# Patient Record
Sex: FEMALE | Race: BLACK OR AFRICAN AMERICAN | Employment: FULL TIME | URBAN - METROPOLITAN AREA
[De-identification: names, ages, dates, MRNs, and addresses within clinical notes are randomized per-mention and may not be internally consistent; named-entity substitution may affect disease eponyms.]

---

## 2017-06-15 ENCOUNTER — HOSPITAL ENCOUNTER (OUTPATIENT)
Dept: NUCLEAR MEDICINE | Age: 37
Discharge: HOME | End: 2017-06-15
Payer: COMMERCIAL

## 2017-06-15 ENCOUNTER — HOSPITAL ENCOUNTER (OUTPATIENT)
Dept: ULTRASOUND IMAGING | Age: 37
Discharge: HOME | End: 2017-06-15
Payer: COMMERCIAL

## 2017-06-15 DIAGNOSIS — R07.9 CHEST PAIN, UNSPECIFIED TYPE: ICD-10-CM

## 2017-06-15 DIAGNOSIS — R07.9 CHEST PAIN AT REST: ICD-10-CM

## 2017-06-15 DIAGNOSIS — R12 HEARTBURN: ICD-10-CM

## 2017-06-15 DIAGNOSIS — E03.9 HYPOTHYROIDISM, ADULT: ICD-10-CM

## 2017-06-15 DIAGNOSIS — R00.2 PALPITATIONS: ICD-10-CM

## 2017-06-15 PROCEDURE — 78227 HEPATOBIL SYST IMAGE W/DRUG: CPT

## 2017-06-15 PROCEDURE — 3430000000 HC RX DIAGNOSTIC RADIOPHARMACEUTICAL: Performed by: INTERNAL MEDICINE

## 2017-06-15 PROCEDURE — A9537 TC99M MEBROFENIN: HCPCS | Performed by: INTERNAL MEDICINE

## 2017-06-15 PROCEDURE — 76705 ECHO EXAM OF ABDOMEN: CPT

## 2017-06-15 RX ADMIN — Medication 7.5 MILLICURIE: at 08:15

## 2017-08-09 ENCOUNTER — HOSPITAL ENCOUNTER (OUTPATIENT)
Dept: GENERAL RADIOLOGY | Age: 37
Discharge: HOME | End: 2017-08-09
Payer: COMMERCIAL

## 2017-08-09 DIAGNOSIS — M25.551 RIGHT HIP PAIN: ICD-10-CM

## 2017-08-09 DIAGNOSIS — M54.5 LOW BACK PAIN, UNSPECIFIED BACK PAIN LATERALITY, UNSPECIFIED CHRONICITY, WITH SCIATICA PRESENCE UNSPECIFIED: ICD-10-CM

## 2017-08-09 PROCEDURE — 72110 X-RAY EXAM L-2 SPINE 4/>VWS: CPT

## 2017-08-09 PROCEDURE — 73502 X-RAY EXAM HIP UNI 2-3 VIEWS: CPT

## 2017-09-11 ENCOUNTER — OFFICE VISIT (OUTPATIENT)
Dept: OBGYN | Age: 37
End: 2017-09-11

## 2017-09-11 VITALS
HEIGHT: 64 IN | SYSTOLIC BLOOD PRESSURE: 115 MMHG | DIASTOLIC BLOOD PRESSURE: 80 MMHG | BODY MASS INDEX: 36.19 KG/M2 | WEIGHT: 212 LBS

## 2017-09-11 DIAGNOSIS — Z01.419 WELL WOMAN EXAM WITH ROUTINE GYNECOLOGICAL EXAM: Primary | ICD-10-CM

## 2017-09-11 DIAGNOSIS — Z11.51 SCREENING FOR HPV (HUMAN PAPILLOMAVIRUS): ICD-10-CM

## 2017-09-11 PROCEDURE — 99395 PREV VISIT EST AGE 18-39: CPT | Performed by: OBSTETRICS & GYNECOLOGY

## 2017-09-11 RX ORDER — NAPROXEN 250 MG/1
TABLET ORAL
Refills: 1 | COMMUNITY
Start: 2017-08-13 | End: 2021-08-18

## 2017-09-11 RX ORDER — CYCLOBENZAPRINE HCL 5 MG
TABLET ORAL
Refills: 1 | COMMUNITY
Start: 2017-08-13 | End: 2017-09-13

## 2017-09-11 RX ORDER — ONDANSETRON 4 MG/1
TABLET, ORALLY DISINTEGRATING ORAL
Refills: 2 | COMMUNITY
Start: 2017-08-13 | End: 2021-10-28 | Stop reason: SDUPTHER

## 2017-09-11 RX ORDER — CHOLECALCIFEROL (VITAMIN D3) 50 MCG
CAPSULE ORAL
Refills: 2 | COMMUNITY
Start: 2017-08-13

## 2017-09-11 RX ORDER — LEVOTHYROXINE SODIUM 0.2 MG/1
200 TABLET ORAL
Refills: 2 | COMMUNITY
Start: 2017-08-13 | End: 2021-08-18 | Stop reason: SDUPTHER

## 2017-09-12 LAB — PAP SMEAR: NORMAL

## 2017-09-13 ENCOUNTER — OFFICE VISIT (OUTPATIENT)
Dept: PRIMARY CARE CLINIC | Age: 37
End: 2017-09-13

## 2017-09-13 VITALS
DIASTOLIC BLOOD PRESSURE: 58 MMHG | SYSTOLIC BLOOD PRESSURE: 102 MMHG | HEART RATE: 64 BPM | TEMPERATURE: 98 F | RESPIRATION RATE: 14 BRPM | WEIGHT: 206 LBS | BODY MASS INDEX: 34.32 KG/M2 | HEIGHT: 65 IN

## 2017-09-13 DIAGNOSIS — E03.9 HYPOTHYROIDISM, UNSPECIFIED TYPE: ICD-10-CM

## 2017-09-13 DIAGNOSIS — K21.9 GASTROESOPHAGEAL REFLUX DISEASE WITHOUT ESOPHAGITIS: Primary | ICD-10-CM

## 2017-09-13 DIAGNOSIS — E66.01 MORBID OBESITY DUE TO EXCESS CALORIES (HCC): ICD-10-CM

## 2017-09-13 PROCEDURE — 99204 OFFICE O/P NEW MOD 45 MIN: CPT | Performed by: FAMILY MEDICINE

## 2017-09-13 RX ORDER — PHENTERMINE HYDROCHLORIDE 37.5 MG/1
37.5 TABLET ORAL
Qty: 30 TABLET | Refills: 0 | Status: SHIPPED | OUTPATIENT
Start: 2017-09-13 | End: 2017-10-10 | Stop reason: SDUPTHER

## 2017-09-13 ASSESSMENT — PATIENT HEALTH QUESTIONNAIRE - PHQ9
2. FEELING DOWN, DEPRESSED OR HOPELESS: 0
SUM OF ALL RESPONSES TO PHQ9 QUESTIONS 1 & 2: 0
1. LITTLE INTEREST OR PLEASURE IN DOING THINGS: 0
SUM OF ALL RESPONSES TO PHQ QUESTIONS 1-9: 0

## 2017-09-13 ASSESSMENT — ENCOUNTER SYMPTOMS
ABDOMINAL PAIN: 0
WATER BRASH: 0
SORE THROAT: 0
STRIDOR: 0
NAUSEA: 0
HOARSE VOICE: 0
WHEEZING: 0
CHOKING: 0
GLOBUS SENSATION: 0

## 2017-09-25 DIAGNOSIS — Z01.419 WELL WOMAN EXAM WITH ROUTINE GYNECOLOGICAL EXAM: ICD-10-CM

## 2017-09-25 DIAGNOSIS — Z11.51 SCREENING FOR HPV (HUMAN PAPILLOMAVIRUS): ICD-10-CM

## 2017-10-10 ENCOUNTER — OFFICE VISIT (OUTPATIENT)
Dept: PRIMARY CARE CLINIC | Age: 37
End: 2017-10-10

## 2017-10-10 VITALS
OXYGEN SATURATION: 97 % | TEMPERATURE: 98.5 F | HEIGHT: 64 IN | RESPIRATION RATE: 15 BRPM | HEART RATE: 69 BPM | DIASTOLIC BLOOD PRESSURE: 68 MMHG | BODY MASS INDEX: 36.19 KG/M2 | WEIGHT: 212 LBS | SYSTOLIC BLOOD PRESSURE: 114 MMHG

## 2017-10-10 DIAGNOSIS — K21.9 GASTROESOPHAGEAL REFLUX DISEASE WITHOUT ESOPHAGITIS: ICD-10-CM

## 2017-10-10 DIAGNOSIS — E03.9 HYPOTHYROIDISM, UNSPECIFIED TYPE: Primary | ICD-10-CM

## 2017-10-10 DIAGNOSIS — E66.01 MORBID OBESITY DUE TO EXCESS CALORIES (HCC): ICD-10-CM

## 2017-10-10 DIAGNOSIS — E55.9 VITAMIN D DEFICIENCY: ICD-10-CM

## 2017-10-10 DIAGNOSIS — E03.9 HYPOTHYROIDISM, UNSPECIFIED TYPE: ICD-10-CM

## 2017-10-10 LAB
ALBUMIN SERPL-MCNC: 3.9 G/DL (ref 3.9–4.9)
ALP BLD-CCNC: 58 U/L (ref 40–130)
ALT SERPL-CCNC: 15 U/L (ref 0–33)
ANION GAP SERPL CALCULATED.3IONS-SCNC: 13 MEQ/L (ref 7–13)
AST SERPL-CCNC: 16 U/L (ref 0–35)
BASOPHILS ABSOLUTE: 0.1 K/UL (ref 0–0.2)
BASOPHILS RELATIVE PERCENT: 0.8 %
BILIRUB SERPL-MCNC: 0.1 MG/DL (ref 0–1.2)
BUN BLDV-MCNC: 8 MG/DL (ref 6–20)
CALCIUM SERPL-MCNC: 8.9 MG/DL (ref 8.6–10.2)
CHLORIDE BLD-SCNC: 104 MEQ/L (ref 98–107)
CO2: 24 MEQ/L (ref 22–29)
CREAT SERPL-MCNC: 0.77 MG/DL (ref 0.5–0.9)
EOSINOPHILS ABSOLUTE: 0.2 K/UL (ref 0–0.7)
EOSINOPHILS RELATIVE PERCENT: 3.1 %
GFR AFRICAN AMERICAN: >60
GFR NON-AFRICAN AMERICAN: >60
GLOBULIN: 2.8 G/DL (ref 2.3–3.5)
GLUCOSE BLD-MCNC: 80 MG/DL (ref 74–109)
HCT VFR BLD CALC: 39.7 % (ref 37–47)
HEMOGLOBIN: 12.6 G/DL (ref 12–16)
LYMPHOCYTES ABSOLUTE: 2.4 K/UL (ref 1–4.8)
LYMPHOCYTES RELATIVE PERCENT: 37.4 %
MCH RBC QN AUTO: 26.4 PG (ref 27–31.3)
MCHC RBC AUTO-ENTMCNC: 31.7 % (ref 33–37)
MCV RBC AUTO: 83.5 FL (ref 82–100)
MONOCYTES ABSOLUTE: 0.6 K/UL (ref 0.2–0.8)
MONOCYTES RELATIVE PERCENT: 8.6 %
NEUTROPHILS ABSOLUTE: 3.2 K/UL (ref 1.4–6.5)
NEUTROPHILS RELATIVE PERCENT: 50.1 %
PDW BLD-RTO: 15.1 % (ref 11.5–14.5)
PLATELET # BLD: 321 K/UL (ref 130–400)
POTASSIUM SERPL-SCNC: 4.2 MEQ/L (ref 3.5–5.1)
RBC # BLD: 4.75 M/UL (ref 4.2–5.4)
SODIUM BLD-SCNC: 141 MEQ/L (ref 132–144)
TOTAL PROTEIN: 6.7 G/DL (ref 6.4–8.1)
TSH SERPL DL<=0.05 MIU/L-ACNC: 40.92 UIU/ML (ref 0.27–4.2)
VITAMIN D 25-HYDROXY: 17.9 NG/ML (ref 30–100)
WBC # BLD: 6.5 K/UL (ref 4.8–10.8)

## 2017-10-10 PROCEDURE — 99214 OFFICE O/P EST MOD 30 MIN: CPT | Performed by: FAMILY MEDICINE

## 2017-10-10 RX ORDER — PHENTERMINE HYDROCHLORIDE 37.5 MG/1
37.5 TABLET ORAL
Qty: 30 TABLET | Refills: 0 | Status: SHIPPED | OUTPATIENT
Start: 2017-10-10 | End: 2017-11-09

## 2017-10-10 ASSESSMENT — ENCOUNTER SYMPTOMS
ABDOMINAL PAIN: 0
VOMITING: 0
CONSTIPATION: 0
NAUSEA: 0
DIARRHEA: 0
WHEEZING: 0
SHORTNESS OF BREATH: 0

## 2017-10-10 NOTE — PROGRESS NOTES
Subjective:      Patient ID: Matthew Connell is a 40 y.o. female who presents today for:  Chief Complaint   Patient presents with    Weight Management     patient following up for weight management. patient is currently taking adipex and is here for her 2nd prescription. HPI    WEIGHT MANAGEMENT  Patient is here for a follow up and refill on Adipex. She is up 6 lbs since her last visit. She denies any symptoms. Fu gerd,thyr     Chronic,stable    Past Medical History:   Diagnosis Date    Thyroid disease      Past Surgical History:   Procedure Laterality Date    DENTAL SURGERY      THYROIDECTOMY      TUBAL LIGATION       No family history on file. Social History     Social History    Marital status:      Spouse name: N/A    Number of children: N/A    Years of education: N/A     Occupational History    Not on file. Social History Main Topics    Smoking status: Never Smoker    Smokeless tobacco: Never Used    Alcohol use 0.0 oz/week    Drug use: No    Sexual activity: Yes     Other Topics Concern    Not on file     Social History Narrative    No narrative on file     Allergies:  Review of patient's allergies indicates no known allergies. Review of Systems   Constitutional: Negative for activity change, appetite change, chills, diaphoresis, fatigue, fever and unexpected weight change. Respiratory: Negative for shortness of breath and wheezing. Cardiovascular: Negative for chest pain, palpitations and leg swelling. Gastrointestinal: Negative for abdominal pain, constipation, diarrhea, nausea and vomiting. Neurological: Negative for dizziness, light-headedness, numbness and headaches. Objective:   /68   Pulse 69   Temp 98.5 °F (36.9 °C) (Tympanic)   Resp 15   Ht 5' 4\" (1.626 m)   Wt 212 lb (96.2 kg)   LMP 08/31/2017   SpO2 97%   BMI 36.39 kg/m²     Physical Exam      PHYSICAL EXAMINATION:   VITAL SIGNS: are as recorded.   GENERAL:  The patient appears well nourished and well-developed, and with normal affect. No acute respiratory distress. Alert and oriented times 3. No skin rashes. HEENT:  TMs normal bilaterally. Canals and ears normal. Pharynx is clear. Extraocular eye motions intact and pain free. Pupils reactive and equally round. Sclerae and conjunctivae clear, normocephalic and atraumatic. RESPIRATORY:  Clear and equal breath sounds with no acute respiratory distress. HEART: Regular rhythm without murmur, rub or gallop. ABDOMEN:  soft, nontender. No masses, guarding or rebound. Normoactive bowel sounds. NECK: No masses or adenopathy palpable. No bruits heard. No asymmetry visible. COMPLETE EXTREMITIES: No edema, all four extremities with posterior tibial and radial pulses strong and palpable. EXTREMITIES:  no edema in any extremity. No cyanosis or clubbing. LOW BACK: No tenderness to palpation of inferior lumbar spine or either sacroiliac joint area. Assessment:     1. Hypothyroidism, unspecified type  TSH without Reflex    Comprehensive Metabolic Panel   2. Morbid obesity due to excess calories (HCC)  phentermine (ADIPEX-P) 37.5 MG tablet   3. Gastroesophageal reflux disease without esophagitis  CBC Auto Differential   4.  Vitamin D deficiency  Vitamin D 25 Hydroxy       Plan:      Orders Placed This Encounter   Procedures    TSH without Reflex     Standing Status:   Future     Number of Occurrences:   1     Standing Expiration Date:   11/10/2017    Comprehensive Metabolic Panel     Standing Status:   Future     Number of Occurrences:   1     Standing Expiration Date:   10/10/2018    CBC Auto Differential     Standing Status:   Future     Number of Occurrences:   1     Standing Expiration Date:   11/10/2017    Vitamin D 25 Hydroxy     Standing Status:   Future     Number of Occurrences:   1     Standing Expiration Date:   10/10/2018     Orders Placed This Encounter   Medications    phentermine (ADIPEX-P) 37.5 MG tablet     Sig: Take 1 tablet by mouth every morning (before breakfast) Body mass index is 36.39 kg/m². Dispense:  30 tablet     Refill:  0     Lose wt    Vit d & thyr next    Controlled Substances Monitoring:      Return in about 4 weeks (around 11/7/2017). IMaddy, am scribing for and in the presence of Oliver Orourke MD. Electronically signed by : Devin Maynard MD, personally performed the services described in this documentation, as scribed by Maddy HALLMAN   in my presence, and it is both accurate and complete.  Electronically signed by: Oliver Orourke MD    10/11/17 6:19 AM    Oliver Orourke MD

## 2017-10-11 RX ORDER — LEVOTHYROXINE SODIUM 0.05 MG/1
50 TABLET ORAL DAILY
Qty: 30 TABLET | Refills: 3 | Status: SHIPPED | OUTPATIENT
Start: 2017-10-11 | End: 2021-08-18 | Stop reason: SDUPTHER

## 2017-10-11 RX ORDER — CHOLECALCIFEROL (VITAMIN D3) 50 MCG
2000 TABLET ORAL DAILY
Qty: 30 TABLET | Refills: 3 | Status: CANCELLED | OUTPATIENT
Start: 2017-10-11

## 2017-10-11 RX ORDER — LEVOTHYROXINE SODIUM 0.07 MG/1
75 TABLET ORAL DAILY
Qty: 30 TABLET | Refills: 2 | Status: CANCELLED | OUTPATIENT
Start: 2017-10-11

## 2017-12-01 ENCOUNTER — OFFICE VISIT (OUTPATIENT)
Dept: OBGYN | Age: 37
End: 2017-12-01

## 2017-12-01 VITALS
WEIGHT: 207 LBS | DIASTOLIC BLOOD PRESSURE: 62 MMHG | BODY MASS INDEX: 35.34 KG/M2 | HEIGHT: 64 IN | SYSTOLIC BLOOD PRESSURE: 96 MMHG

## 2017-12-01 DIAGNOSIS — N76.0 VAGINOSIS: Primary | ICD-10-CM

## 2017-12-01 PROCEDURE — G8417 CALC BMI ABV UP PARAM F/U: HCPCS | Performed by: NURSE PRACTITIONER

## 2017-12-01 PROCEDURE — 99213 OFFICE O/P EST LOW 20 MIN: CPT | Performed by: NURSE PRACTITIONER

## 2017-12-01 PROCEDURE — G8427 DOCREV CUR MEDS BY ELIG CLIN: HCPCS | Performed by: NURSE PRACTITIONER

## 2017-12-01 PROCEDURE — 4004F PT TOBACCO SCREEN RCVD TLK: CPT | Performed by: NURSE PRACTITIONER

## 2017-12-01 PROCEDURE — G8484 FLU IMMUNIZE NO ADMIN: HCPCS | Performed by: NURSE PRACTITIONER

## 2017-12-01 RX ORDER — METRONIDAZOLE 500 MG/1
500 TABLET ORAL 2 TIMES DAILY
Qty: 14 TABLET | Refills: 0 | Status: SHIPPED | OUTPATIENT
Start: 2017-12-01 | End: 2017-12-08

## 2017-12-01 NOTE — PROGRESS NOTES
SUBJECTIVE: 40 y.o. female Pt here today for c/o increased discharge that is white and foul odor for the past week. Pt denies vaginal itching/burning, recent antibiotic use, hormone/contraceptive use, increase in sexual intercourse. Pt has not tried any OTC treatments other than douching, she has a new partner and uses tampons. OBJECTIVE:  BP 96/62   Ht 5' 3.5\" (1.613 m)   Wt 207 lb (93.9 kg)   LMP 11/02/2017 (Exact Date)   Breastfeeding? No   BMI 36.09 kg/m²     Physical Exam    Pelvic Exam:   EFG: normal external genitalia  URETHRA: normal appearing without diverticula or lesions  VULVA: normal appearing vulva with no masses, tenderness or lesions  VAGINA: normal rugae, scant white discharge   CERVIX: parous, no lesions  UTERUS: difficult to assess due to body habitus, nontender. ADNEXA: difficult to assess due to body habitus, nontender. PERINEUM: normal appearing without lesions or masses  ANUS: normal appearing without lesions or masses, no fissures or hemorrhoids     ASSESSMENT:   Vaginosis    PLAN:    STI culture pending  Metronidazole 500mg PO BID x 7 days, #14, no refills. Appropriate use of medication discussed, pt verbalized understanding. Information given and discussed in regards to BV. Pt verbalized understanding. Pt advised to RTC if s/s worsen or fail to improve, pt verbalized understanding.

## 2017-12-11 ENCOUNTER — TELEPHONE (OUTPATIENT)
Dept: OBGYN | Age: 37
End: 2017-12-11

## 2017-12-11 DIAGNOSIS — N76.0 VAGINOSIS: ICD-10-CM

## 2017-12-11 NOTE — TELEPHONE ENCOUNTER
----- Message from Nicolasa Goodman CNP sent at 12/11/2017  2:28 PM EST -----  Pt treated for BV with appt. Pt with yeast if she is having s/s and desires treatment, she may have Fluconazole 150 mg PO x 1, with 1 refill to take in 72 hours if no improvement and Terazol 3, insert 1 suppository intravaginally at bedtime for 3 consecutive days, #3 suppositories, no refills. Pt with ureaplasma species, if having s/s pt may have Azithromycin 1gm, Take (500mg) 2 tabs PO in a single dose, Disp #2, no refills.

## 2017-12-12 RX ORDER — AZITHROMYCIN 500 MG/1
1000 TABLET, FILM COATED ORAL ONCE
Qty: 2 TABLET | Refills: 0 | Status: SHIPPED | OUTPATIENT
Start: 2017-12-12 | End: 2017-12-12

## 2017-12-12 RX ORDER — FLUCONAZOLE 150 MG/1
150 TABLET ORAL ONCE
Qty: 2 TABLET | Refills: 0 | Status: SHIPPED | OUTPATIENT
Start: 2017-12-12 | End: 2017-12-12

## 2017-12-12 RX ORDER — TERCONAZOLE 80 MG/1
80 SUPPOSITORY VAGINAL NIGHTLY
Qty: 3 SUPPOSITORY | Refills: 0 | Status: SHIPPED | OUTPATIENT
Start: 2017-12-12 | End: 2017-12-15

## 2019-07-13 ENCOUNTER — HOSPITAL ENCOUNTER (OUTPATIENT)
Dept: ULTRASOUND IMAGING | Age: 39
Discharge: HOME | End: 2019-07-15
Payer: COMMERCIAL

## 2019-07-13 DIAGNOSIS — N83.201 RIGHT OVARIAN CYST: ICD-10-CM

## 2019-07-13 PROCEDURE — 76856 US EXAM PELVIC COMPLETE: CPT

## 2019-07-13 PROCEDURE — 76830 TRANSVAGINAL US NON-OB: CPT

## 2020-09-22 ENCOUNTER — HOSPITAL ENCOUNTER (OUTPATIENT)
Dept: ULTRASOUND IMAGING | Age: 40
Discharge: HOME | End: 2020-09-24
Payer: COMMERCIAL

## 2020-09-22 PROCEDURE — 76830 TRANSVAGINAL US NON-OB: CPT

## 2020-09-22 PROCEDURE — 76856 US EXAM PELVIC COMPLETE: CPT

## 2021-03-09 ENCOUNTER — HOSPITAL ENCOUNTER (OUTPATIENT)
Dept: GENERAL RADIOLOGY | Age: 41
Discharge: HOME | End: 2021-03-11
Payer: COMMERCIAL

## 2021-03-09 DIAGNOSIS — K14.8 ATROPHY OF TONGUE: ICD-10-CM

## 2021-03-09 PROCEDURE — 72050 X-RAY EXAM NECK SPINE 4/5VWS: CPT

## 2021-03-20 ENCOUNTER — HOSPITAL ENCOUNTER (OUTPATIENT)
Dept: ULTRASOUND IMAGING | Age: 41
Discharge: HOME | End: 2021-03-22
Payer: COMMERCIAL

## 2021-03-20 DIAGNOSIS — R59.0 VIRCHOW'S NODE: ICD-10-CM

## 2021-03-20 PROCEDURE — 76999 ECHO EXAMINATION PROCEDURE: CPT

## 2021-07-20 ENCOUNTER — HOSPITAL ENCOUNTER (OUTPATIENT)
Dept: CT IMAGING | Age: 41
Discharge: HOME | End: 2021-07-22
Payer: COMMERCIAL

## 2021-07-20 DIAGNOSIS — R42 DIZZINESS AND GIDDINESS: ICD-10-CM

## 2021-07-20 PROCEDURE — 70450 CT HEAD/BRAIN W/O DYE: CPT

## 2021-08-18 ENCOUNTER — OFFICE VISIT (OUTPATIENT)
Dept: ENDOCRINOLOGY | Age: 41
End: 2021-08-18
Payer: COMMERCIAL

## 2021-08-18 VITALS
HEART RATE: 67 BPM | HEIGHT: 64 IN | WEIGHT: 197 LBS | DIASTOLIC BLOOD PRESSURE: 79 MMHG | OXYGEN SATURATION: 98 % | BODY MASS INDEX: 33.63 KG/M2 | SYSTOLIC BLOOD PRESSURE: 117 MMHG

## 2021-08-18 DIAGNOSIS — E28.2 PCOS (POLYCYSTIC OVARIAN SYNDROME): Primary | ICD-10-CM

## 2021-08-18 DIAGNOSIS — E88.81 INSULIN RESISTANCE: ICD-10-CM

## 2021-08-18 DIAGNOSIS — E89.0 POSTOPERATIVE HYPOTHYROIDISM: ICD-10-CM

## 2021-08-18 DIAGNOSIS — L68.0 HIRSUTISM: ICD-10-CM

## 2021-08-18 DIAGNOSIS — R07.9 CHEST PAIN, UNSPECIFIED TYPE: ICD-10-CM

## 2021-08-18 PROCEDURE — G8427 DOCREV CUR MEDS BY ELIG CLIN: HCPCS | Performed by: INTERNAL MEDICINE

## 2021-08-18 PROCEDURE — G8417 CALC BMI ABV UP PARAM F/U: HCPCS | Performed by: INTERNAL MEDICINE

## 2021-08-18 PROCEDURE — 99243 OFF/OP CNSLTJ NEW/EST LOW 30: CPT | Performed by: INTERNAL MEDICINE

## 2021-08-18 RX ORDER — LEVOTHYROXINE SODIUM 0.2 MG/1
TABLET ORAL
Qty: 30 TABLET | Refills: 3 | Status: SHIPPED | OUTPATIENT
Start: 2021-08-18 | End: 2022-10-07

## 2021-08-18 RX ORDER — LEVOTHYROXINE SODIUM 0.03 MG/1
TABLET ORAL
Qty: 30 TABLET | Refills: 6 | Status: SHIPPED | OUTPATIENT
Start: 2021-08-18 | End: 2022-04-25

## 2021-08-18 RX ORDER — SPIRONOLACTONE 50 MG/1
50 TABLET, FILM COATED ORAL 2 TIMES DAILY
Qty: 60 TABLET | Refills: 3 | Status: SHIPPED | OUTPATIENT
Start: 2021-08-18

## 2021-08-18 RX ORDER — BUSPIRONE HYDROCHLORIDE 5 MG/1
5 TABLET ORAL 3 TIMES DAILY
COMMUNITY
End: 2022-06-14 | Stop reason: ALTCHOICE

## 2021-08-18 ASSESSMENT — ENCOUNTER SYMPTOMS
EYES NEGATIVE: 1
SWOLLEN GLANDS: 0
RESPIRATORY NEGATIVE: 1

## 2021-08-18 NOTE — PROGRESS NOTES
8/18/2021    Assessment:       Diagnosis Orders   1. PCOS (polycystic ovarian syndrome)     2. Postoperative hypothyroidism  TSH without Reflex    T4, Free   3. Insulin resistance  Insulin, Total    Hemoglobin Y8C    Basic Metabolic Panel   4. Chest pain, unspecified type  [id number] Val Russell DO, Cardiology, [place]   5.  Hirsutism  Testosterone Free And Total, Non Male         PLAN:     Orders Placed This Encounter   Procedures    TSH without Reflex     Standing Status:   Future     Standing Expiration Date:   8/18/2022    T4, Free     Standing Status:   Future     Standing Expiration Date:   8/18/2022    Insulin, Total     Standing Status:   Future     Standing Expiration Date:   8/18/2022    Hemoglobin A1C     Standing Status:   Future     Standing Expiration Date:   8/18/2022    Basic Metabolic Panel     Standing Status:   Future     Standing Expiration Date:   8/18/2022    Testosterone Free And Total, Non Male     Standing Status:   Future     Standing Expiration Date:   8/18/2022   Val Mcdaniels DO, Cardiology, [place]     Referral Priority:   Routine     Referral Type:   Eval and Treat     Referral Reason:   Specialty Services Required     Referred to Provider:   Calvin Owens DO     Requested Specialty:   Cardiology     Number of Visits Requested:   1     Continue Synthroid 225 mcg daily advised compliance advised to take Synthroid start patient on Metformin and Aldactone PCOS hirsutism refer patient to cardiology for chest pain patient educated about PCOS hypothyroidism insulin resistance more than 50% of 40 minutes spent patient education counseling thank you for the referral  Labs to be ordered for BMP A1c insulin and thyroid antibodies  Orders Placed This Encounter   Medications    levothyroxine (SYNTHROID) 200 MCG tablet     Sig: Once a day in addition to 25 mcg total dose 225 mcg     Dispense:  30 tablet     Refill:  3    levothyroxine (SYNTHROID) 25 MCG tablet     Sig: Once day total dose 225 mcg daily     Dispense:  30 tablet     Refill:  06    metFORMIN (GLUCOPHAGE) 500 MG tablet     Sig: Take 1 tablet by mouth 2 times daily (with meals)     Dispense:  180 tablet     Refill:  1    spironolactone (ALDACTONE) 50 MG tablet     Sig: Take 1 tablet by mouth 2 times daily     Dispense:  60 tablet     Refill:  3       Subjective:     Chief Complaint   Patient presents with    New Patient    Thyroid Problem     Vitals:    08/18/21 0857   BP: 117/79   Pulse: 67   SpO2: 98%   Weight: 197 lb (89.4 kg)   Height: 5' 3.5\" (1.613 m)     Wt Readings from Last 3 Encounters:   08/18/21 197 lb (89.4 kg)   12/01/17 207 lb (93.9 kg)   10/10/17 212 lb (96.2 kg)     BP Readings from Last 3 Encounters:   08/18/21 117/79   12/01/17 96/62   10/10/17 114/68     Patient referred here for hypothyroidism postop thyroid surgery was done more than 10 years ago at the [facility] for the patient has been on thyroid replacement takes 225 mcg daily has not been very compliant to her replacement last TSH was elevated from [facility] at 16  Patient also has been evaluated for PCOS insulin resistance obesity A1c was 5.8 a year ago borderline denies a history of diabetes prediabetes does also have increased facial hair ingrown hair check  Patient also seen in the emergency room recently for chest pain patient never had chest pain prior to that before denies any family history of heart disease    Chest Pain   This is a new problem. The current episode started 1 to 4 weeks ago. The onset quality is sudden. The problem has been waxing and waning. The pain is present in the substernal region. The pain radiates to the left shoulder. The pain is aggravated by nothing. She has tried nothing for the symptoms. Other  This is a new (Hypothyroidism) problem. The current episode started more than 1 year ago. The problem has been waxing and waning. Associated symptoms include chest pain and fatigue.  Pertinent negatives Attends Club or Organization Meetings:     Marital Status:    Intimate Partner Violence:     Fear of Current or Ex-Partner:     Emotionally Abused:     Physically Abused:     Sexually Abused:      No family history on file. No Known Allergies    Current Outpatient Medications:     busPIRone (BUSPAR) 5 MG tablet, Take 5 mg by mouth 3 times daily, Disp: , Rfl:     Multiple Vitamin (MULTIVITAMIN ADULT PO), Take by mouth, Disp: , Rfl:     levothyroxine (SYNTHROID) 50 MCG tablet, Take 1 tablet by mouth Daily (Patient taking differently: Take 25 mcg by mouth Daily ), Disp: 30 tablet, Rfl: 3    levothyroxine (SYNTHROID) 200 MCG tablet, 200 mcg , Disp: , Rfl: 2    D3 SUPER STRENGTH 2000 units CAPS, TAKE 1 CAPSULE DAILY, Disp: , Rfl: 2    ondansetron (ZOFRAN-ODT) 4 MG disintegrating tablet, Dissolve one tablet in the mouth every 8 hours as needed for NAUSEA AND VOMITING, Disp: , Rfl: 2    Pantoprazole Sodium (PROTONIX PO), Take by mouth, Disp: , Rfl:   Lab Results   Component Value Date     10/10/2017    K 4.2 10/10/2017     10/10/2017    CO2 24 10/10/2017    BUN 8 10/10/2017    CREATININE 0.77 10/10/2017    GLUCOSE 80 10/10/2017    CALCIUM 8.9 10/10/2017    PROT 6.7 10/10/2017    LABALBU 3.9 10/10/2017    BILITOT 0.1 10/10/2017    ALKPHOS 58 10/10/2017    AST 16 10/10/2017    ALT 15 10/10/2017    LABGLOM >60.0 10/10/2017    GFRAA >60.0 10/10/2017    GLOB 2.8 10/10/2017     Lab Results   Component Value Date    WBC 6.5 10/10/2017    HGB 12.6 10/10/2017    HCT 39.7 10/10/2017    MCV 83.5 10/10/2017     10/10/2017     No results found for: LABA1C  No results found for: CHOLFAST, TRIGLYCFAST, HDL, LDLCALC, CHOL, TRIG  No results found for: TESTM  Lab Results   Component Value Date    TSH 40.920 (H) 10/10/2017     No results found for: TPOABS    Review of Systems   Constitutional: Positive for fatigue and unexpected weight change. Eyes: Negative. Respiratory: Negative.     Cardiovascular: Positive for chest pain. Chest pressure   Endocrine: Negative. Genitourinary: Negative. Musculoskeletal: Negative for neck pain. Skin:        Facial hair skin breakouts   All other systems reviewed and are negative. Objective:   Physical Exam  Vitals reviewed. Constitutional:       General: She is not in acute distress. Appearance: Normal appearance. She is obese. HENT:      Head: Normocephalic and atraumatic. Hair is normal.      Right Ear: External ear normal.      Left Ear: External ear normal.      Nose: Nose normal.      Mouth/Throat:      Pharynx: Oropharynx is clear. Eyes:      General: No scleral icterus. Right eye: No discharge. Left eye: No discharge. Extraocular Movements: Extraocular movements intact. Conjunctiva/sclera: Conjunctivae normal.   Neck:      Trachea: Trachea normal.     Cardiovascular:      Rate and Rhythm: Normal rate and regular rhythm. Heart sounds: Normal heart sounds. Pulmonary:      Effort: Pulmonary effort is normal.      Breath sounds: Normal breath sounds. No wheezing or rhonchi. Abdominal:      Palpations: Abdomen is soft. Musculoskeletal:         General: Normal range of motion. Cervical back: Full passive range of motion without pain, normal range of motion and neck supple. Skin:     General: Skin is warm and dry. Neurological:      General: No focal deficit present. Mental Status: She is alert and oriented to person, place, and time.    Psychiatric:         Mood and Affect: Mood normal.         Behavior: Behavior normal.

## 2021-09-01 ENCOUNTER — VIRTUAL VISIT (OUTPATIENT)
Dept: FAMILY MEDICINE CLINIC | Age: 41
End: 2021-09-01
Payer: COMMERCIAL

## 2021-09-01 DIAGNOSIS — Z20.822 CLOSE EXPOSURE TO COVID-19 VIRUS: Primary | ICD-10-CM

## 2021-09-01 PROCEDURE — 99442 PR PHYS/QHP TELEPHONE EVALUATION 11-20 MIN: CPT

## 2021-09-01 ASSESSMENT — ENCOUNTER SYMPTOMS
VOMITING: 0
EYE DISCHARGE: 0
RHINORRHEA: 0
WHEEZING: 0
COUGH: 0
ABDOMINAL PAIN: 0
SINUS PAIN: 0
EYE PAIN: 0
APNEA: 0
SHORTNESS OF BREATH: 0
FACIAL SWELLING: 0
NAUSEA: 0
BACK PAIN: 0
EYE ITCHING: 0
CHEST TIGHTNESS: 0
DIARRHEA: 0
SORE THROAT: 0
COLOR CHANGE: 0
SINUS PRESSURE: 0
TROUBLE SWALLOWING: 0

## 2021-09-01 NOTE — PROGRESS NOTES
Shavonne Holliday (:  1980) is a 37 y.o. female,Established patient, here for evaluation of the following chief complaint(s): Covid Testing         ASSESSMENT/PLAN:  1. Close exposure to COVID-19 virus  -     COVID-19; Future      Return if symptoms worsen or fail to improve, for Follow up with PCP. SUBJECTIVE/OBJECTIVE:  HPI   Exposure to COVID-19 positive family member at  on Friday. Patient is currently asymptomatic    Review of Systems   Constitutional: Negative for appetite change, chills, diaphoresis, fatigue and fever. HENT: Negative for congestion, ear discharge, ear pain, facial swelling, hearing loss, mouth sores, postnasal drip, rhinorrhea, sinus pressure, sinus pain, sore throat and trouble swallowing. Eyes: Negative for pain, discharge and itching. Respiratory: Negative for apnea, cough, chest tightness, shortness of breath and wheezing. Cardiovascular: Negative for chest pain. Gastrointestinal: Negative for abdominal pain, diarrhea, nausea and vomiting. Endocrine: Negative for cold intolerance and heat intolerance. Genitourinary: Negative for decreased urine volume and difficulty urinating. Musculoskeletal: Negative for arthralgias, back pain and myalgias. Skin: Negative for color change, pallor and rash. Neurological: Negative for dizziness, syncope, weakness, light-headedness and headaches. Hematological: Negative for adenopathy. Psychiatric/Behavioral: Negative for behavioral problems, confusion and sleep disturbance. No flowsheet data found. Physical Exam    This was a telephone visit patient unable to connect with [employer] link    PLAN  Patient with concern for COVID-19, due to exposure last Saturday. She is asymptomatic at this time. Covid order placed. Patient provided education Expect a 7 to 10-day course of the illness before symptoms to resolve.  Return for shortness of breath when walking chest pain or fevers that cannot be controlled with Tylenol or ibuprofen. On this date 9/1/2021 I have spent 15 minutes reviewing previous notes, test results and face to face (virtual) with the patient discussing the diagnosis and importance of compliance with the treatment plan as well as documenting on the day of the visit. Yaneth Bryan, was evaluated through a synchronous (real-time) audio-video encounter. The patient (or guardian if applicable) is aware that this is a billable service. Verbal consent to proceed has been obtained within the past 12 months. The visit was conducted pursuant to the emergency declaration under the [address] [place] [address], [address] [place] [address] authority and the [employer] Act. Patient identification was verified, and a caregiver was present when appropriate. The patient was located in a state where the provider was credentialed to provide care. An electronic signature was used to authenticate this note.     --Elizabeth Padron, OLIVIA - CNP

## 2021-09-01 NOTE — PATIENT INSTRUCTIONS
Patient Education        Viral Infections: Care Instructions  Your Care Instructions     You don't feel well, but it's not clear what's causing it. You may have a viral infection. Viruses cause many illnesses, such as the common cold, influenza, fever, rashes, and the diarrhea, nausea, and vomiting that are often called \"stomach flu. \" You may wonder if antibiotic medicines could make you feel better. But antibiotics only treat infections caused by bacteria. They don't work on viruses. The good news is that viral infections usually aren't serious. Most will go away in a few days without medical treatment. In the meantime, there are a few things you can do to make yourself more comfortable. Follow-up care is a key part of your treatment and safety. Be sure to make and go to all appointments, and call your doctor if you are having problems. It's also a good idea to know your test results and keep a list of the medicines you take. How can you care for yourself at home? · Get plenty of rest if you feel tired. · Take an over-the-counter pain medicine if needed, such as acetaminophen (Tylenol), ibuprofen (Advil, Motrin), or naproxen (Aleve). Read and follow all instructions on the label. · Be careful when taking over-the-counter cold or flu medicines and Tylenol at the same time. Many of these medicines have acetaminophen, which is Tylenol. Read the labels to make sure that you are not taking more than the recommended dose. Too much acetaminophen (Tylenol) can be harmful. · Drink plenty of fluids. If you have kidney, heart, or liver disease and have to limit fluids, talk with your doctor before you increase the amount of fluids you drink. · Stay home from work, school, and other public places while you have a fever. When should you call for help? Call 911 anytime you think you may need emergency care. For example, call if:    · You have severe trouble breathing.     · You passed out (lost consciousness).    Call your doctor now or seek immediate medical care if:    · You seem to be getting much sicker.     · You have a new or higher fever.     · You have blood in your stools.     · You have new belly pain, or your pain gets worse.     · You have a new rash. Watch closely for changes in your health, and be sure to contact your doctor if:    · You start to get better and then get worse.     · You do not get better as expected. Where can you learn more? Go to https://[employer] org and sign in to your [employer] account. Enter Y447 in the [employer] box to learn more about \"Viral Infections: Care Instructions. \"     If you do not have an account, please click on the \"Sign Up Now\" link. Current as of: September 23, 2020               Content Version: 12.9  © 7951-1650 [employer]. Care instructions adapted under license by [facility] ([facility]). If you have questions about a medical condition or this instruction, always ask your healthcare professional. [address] any warranty or liability for your use of this information.

## 2021-10-25 PROBLEM — L02.01 CUTANEOUS ABSCESS OF FACE: Status: ACTIVE | Noted: 2018-09-05

## 2021-10-25 PROBLEM — Q76.2 CONGENITAL SPONDYLOLYSIS, LUMBOSACRAL REGION: Status: ACTIVE | Noted: 2017-09-14

## 2021-10-25 PROBLEM — L83 ACANTHOSIS NIGRICANS: Status: ACTIVE | Noted: 2020-03-07

## 2021-10-25 PROBLEM — Z20.828 CONTACT W AND EXPOSURE TO OTH VIRAL COMMUNICABLE DISEASES: Status: ACTIVE | Noted: 2020-12-18

## 2021-10-25 PROBLEM — M54.10 RADICULAR SYNDROME OF RIGHT LEG: Status: ACTIVE | Noted: 2017-09-14

## 2021-10-25 PROBLEM — R79.89 LOW VITAMIN D LEVEL: Status: ACTIVE | Noted: 2020-03-07

## 2021-10-25 PROBLEM — M48.00 MULTILEVEL NEURAL FORAMINAL STENOSIS: Status: ACTIVE | Noted: 2017-10-30

## 2021-10-25 PROBLEM — E28.2 PCOS (POLYCYSTIC OVARIAN SYNDROME): Status: ACTIVE | Noted: 2020-03-07

## 2021-10-25 PROBLEM — Z20.822 CONTACT WITH AND (SUSPECTED) EXPOSURE TO COVID-19: Status: ACTIVE | Noted: 2021-03-01

## 2021-10-28 ENCOUNTER — TELEPHONE (OUTPATIENT)
Dept: NEUROLOGY | Age: 41
End: 2021-10-28

## 2021-10-28 ENCOUNTER — OFFICE VISIT (OUTPATIENT)
Dept: NEUROLOGY | Age: 41
End: 2021-10-28
Payer: COMMERCIAL

## 2021-10-28 VITALS
WEIGHT: 203 LBS | HEART RATE: 70 BPM | SYSTOLIC BLOOD PRESSURE: 131 MMHG | DIASTOLIC BLOOD PRESSURE: 86 MMHG | BODY MASS INDEX: 35.4 KG/M2

## 2021-10-28 DIAGNOSIS — R26.0 ATAXIC GAIT: ICD-10-CM

## 2021-10-28 DIAGNOSIS — F40.240 CLAUSTROPHOBIA: ICD-10-CM

## 2021-10-28 DIAGNOSIS — G43.109 MIGRAINE WITH AURA AND WITHOUT STATUS MIGRAINOSUS, NOT INTRACTABLE: Primary | ICD-10-CM

## 2021-10-28 DIAGNOSIS — R42 VERTIGO: ICD-10-CM

## 2021-10-28 DIAGNOSIS — Z76.89 ENCOUNTER TO ESTABLISH CARE: ICD-10-CM

## 2021-10-28 PROCEDURE — 1036F TOBACCO NON-USER: CPT | Performed by: NURSE PRACTITIONER

## 2021-10-28 PROCEDURE — 99204 OFFICE O/P NEW MOD 45 MIN: CPT | Performed by: NURSE PRACTITIONER

## 2021-10-28 PROCEDURE — G8427 DOCREV CUR MEDS BY ELIG CLIN: HCPCS | Performed by: NURSE PRACTITIONER

## 2021-10-28 PROCEDURE — G8484 FLU IMMUNIZE NO ADMIN: HCPCS | Performed by: NURSE PRACTITIONER

## 2021-10-28 PROCEDURE — G8417 CALC BMI ABV UP PARAM F/U: HCPCS | Performed by: NURSE PRACTITIONER

## 2021-10-28 RX ORDER — TRAZODONE HYDROCHLORIDE 50 MG/1
TABLET ORAL
COMMUNITY
Start: 2021-08-13

## 2021-10-28 RX ORDER — ERGOCALCIFEROL 1.25 MG/1
CAPSULE ORAL
COMMUNITY
Start: 2021-07-23

## 2021-10-28 RX ORDER — LORAZEPAM 1 MG/1
1 TABLET ORAL ONCE
Qty: 1 TABLET | Refills: 0 | Status: SHIPPED | OUTPATIENT
Start: 2021-10-28 | End: 2021-10-28

## 2021-10-28 RX ORDER — ONDANSETRON 4 MG/1
4 TABLET, ORALLY DISINTEGRATING ORAL 3 TIMES DAILY PRN
Qty: 21 TABLET | Refills: 0 | Status: SHIPPED | OUTPATIENT
Start: 2021-10-28 | End: 2022-02-15 | Stop reason: SDUPTHER

## 2021-10-28 RX ORDER — BUTALBITAL, ACETAMINOPHEN AND CAFFEINE 50; 325; 40 MG/1; MG/1; MG/1
1 TABLET ORAL EVERY 6 HOURS PRN
Qty: 40 TABLET | Refills: 3 | Status: SHIPPED | OUTPATIENT
Start: 2021-10-28

## 2021-10-28 RX ORDER — MECLIZINE HYDROCHLORIDE 25 MG/1
TABLET ORAL
COMMUNITY
Start: 2021-10-20 | End: 2022-02-15 | Stop reason: SDUPTHER

## 2021-10-28 RX ORDER — BUSPIRONE HYDROCHLORIDE 7.5 MG/1
TABLET ORAL
COMMUNITY
Start: 2021-09-17 | End: 2021-10-28

## 2021-10-28 RX ORDER — FAMOTIDINE 40 MG/1
40 TABLET, FILM COATED ORAL DAILY
COMMUNITY

## 2021-10-28 RX ORDER — PANTOPRAZOLE SODIUM 20 MG/1
TABLET, DELAYED RELEASE ORAL
COMMUNITY
Start: 2021-08-13

## 2021-10-28 RX ORDER — ARIPIPRAZOLE 5 MG/1
TABLET ORAL
COMMUNITY
Start: 2021-09-17

## 2021-10-28 RX ORDER — IBUPROFEN 400 MG/1
TABLET ORAL
COMMUNITY
Start: 2021-09-01

## 2021-10-28 RX ORDER — ONDANSETRON 4 MG/1
TABLET, FILM COATED ORAL
COMMUNITY
Start: 2021-10-19 | End: 2021-10-28 | Stop reason: SDUPTHER

## 2021-10-28 ASSESSMENT — ENCOUNTER SYMPTOMS
WHEEZING: 0
COLOR CHANGE: 0
SINUS PAIN: 0
NAUSEA: 0
CHEST TIGHTNESS: 0
COUGH: 0
TROUBLE SWALLOWING: 0
ABDOMINAL PAIN: 0
SHORTNESS OF BREATH: 0
ABDOMINAL DISTENTION: 0
PHOTOPHOBIA: 1
SINUS PRESSURE: 0
VOMITING: 0
BACK PAIN: 0
EYE PAIN: 0

## 2021-10-28 NOTE — PROGRESS NOTES
Subjective:      Patient ID: Tg Yu is a 37 y.o. female who presents today for:  Chief Complaint   Patient presents with    New Patient     Pt states that dizziness happens everyday, onset has been about a year about once a week, but within the last month has been happening often. She says when she drives and also while at work it has been happening everyday multiple times a day. Pt states that for the headaches onset has been over the last couple weeks, anywhere from once or twice a week. she says that she gets nausea, and some vision changes. She had CT of head in July. She takes meclizine to try to help with dizziness. HPI  Pt seen and examined in the office to establish care for headaches and vertigo. Patient is a 69-year-old -American female with past medical history of thyroid disease who presents today for the above complaint. Patient reports that she has had new onset of headaches that started 2 to 3 months ago. She reports that they have been increasing in frequency since last month and she is getting headaches approximately 2-3 times per week currently. Headaches are located frontally in the back of her head. She describes them as pressure and rates them 8 out of 10. She reports they will often last for a couple of hours. She denies any previous history of migraine headaches or headache disorders. There is no history of recent or remote head trauma or injury, neck injury or surgery, seizures, stroke, unusual or unplanned weight loss, recent illness or fevers, medication changes. She is not sure what triggers her headaches. She reports that they are improved with laying down in the dark room and taking Excedrin. Headaches are accompanied by nausea, photophobia, phonophobia, vision changes, dizziness.   Patient reports that she had an eye exam last week that was normal.  Patient also reports that for the last couple months she feels like she is drifting to the right side when walking. She feels off balance often. No falls reported. She reports that she has been dizzy since August of 2021. She reports that dizziness is severe when driving and she often times will get nauseated. Dizziness is not positional and will sometimes last all day. There is no associated tinnitus, otalgia, hearing changes, chest pain/pressure or palpitations. There are no focal neuro deficits or paresthesias. No dysphagia or dysarthria. Orthostatic blood pressures are negative. No positional tachycardia. Patient does report history of anxiety and depression and is on multiple Medications to treat this including Abilify, trazodone, BuSpar. Patient also with thyroid disorder with TSH on 7 1721 noted to be 16. She is currently on large dose of levothyroxine 200 mcg daily. Patient very tearful during our visit today as her symptoms are greatly impacting her quality of life and her ability to work. Past Medical History:   Diagnosis Date    Thyroid disease      Past Surgical History:   Procedure Laterality Date    DENTAL SURGERY      THYROIDECTOMY      TUBAL LIGATION       Social History     Socioeconomic History    Marital status:      Spouse name: Not on file    Number of children: Not on file    Years of education: Not on file    Highest education level: Not on file   Occupational History    Not on file   Tobacco Use    Smoking status: Never Smoker    Smokeless tobacco: Never Used   Substance and Sexual Activity    Alcohol use:  Yes     Alcohol/week: 0.0 standard drinks     Comment: Occassionally    Drug use: No    Sexual activity: Yes   Other Topics Concern    Not on file   Social History Narrative    Not on file     Social Determinants of Health     Financial Resource Strain:     Difficulty of Paying Living Expenses:    Food Insecurity:     Worried About Running Out of Food in the Last Year:     [address] in the Last Year:    Transportation Needs:     Lack of Transportation (Medical):  Lack of Transportation (Non-Medical):    Physical Activity:     Days of Exercise per Week:     Minutes of Exercise per Session:    Stress:     Feeling of Stress :    Social Connections:     Frequency of Communication with Friends and Family:     Frequency of Social Gatherings with Friends and Family:     Attends Muslim Services:     Active Member of Clubs or Organizations:     Attends Club or Organization Meetings:     Marital Status:    Intimate Partner Violence:     Fear of Current or Ex-Partner:     Emotionally Abused:     Physically Abused:     Sexually Abused:      No family history on file. No Known Allergies  Current Outpatient Medications on File Prior to Visit   Medication Sig Dispense Refill    meclizine (ANTIVERT) 25 MG tablet TAKE 1 TABLET BY MOUTH THREE TIMES DAILY AS NEEDED      ondansetron (ZOFRAN) 4 MG tablet TAKE 1 TABLET BY MOUTH EVERY 4 TO 6 HOURS AS NEEDED      ARIPiprazole (ABILIFY) 5 MG tablet TAKE 1 TABLET BY MOUTH EVERY DAY      vitamin D (ERGOCALCIFEROL) 1.25 MG (62771 UT) CAPS capsule TAKE 1 CAPSULE BY MOUTH ONCE A WEEK FOR VITAMIN D DEFICIENCY for 6 months.  famotidine (PEPCID) 40 MG tablet Take 40 mg by mouth daily      ibuprofen (ADVIL;MOTRIN) 400 MG tablet TAKE 1 TABLET BY MOUTH THREE TIMES DAILY FOR 2 WEEKS      traZODone (DESYREL) 50 MG tablet take a 1/2 tablet by mouth about 2 hours before bed to help with sleep; if this dose not help can increase 1 tablet before bed      pantoprazole (PROTONIX) 20 MG tablet TAKE 1 TABLET BY MOUTH TWICE DAILY.  TAKE 1 TABLET IN THE MORNING and TAKE 1 TABLET at NIGHT      busPIRone (BUSPAR) 5 MG tablet Take 5 mg by mouth 3 times daily      Multiple Vitamin (MULTIVITAMIN ADULT PO) Take by mouth      levothyroxine (SYNTHROID) 200 MCG tablet Once a day in addition to 25 mcg total dose 225 mcg 30 tablet 3    levothyroxine (SYNTHROID) 25 MCG tablet Once day total dose 225 mcg daily 30 tablet 06  metFORMIN (GLUCOPHAGE) 500 MG tablet Take 1 tablet by mouth 2 times daily (with meals) 180 tablet 1    spironolactone (ALDACTONE) 50 MG tablet Take 1 tablet by mouth 2 times daily 60 tablet 3    D3 SUPER STRENGTH 2000 units CAPS TAKE 1 CAPSULE DAILY  2    ondansetron (ZOFRAN-ODT) 4 MG disintegrating tablet Dissolve one tablet in the mouth every 8 hours as needed for NAUSEA AND VOMITING  2     No current facility-administered medications on file prior to visit. Review of Systems   Constitutional: Negative for activity change, appetite change, chills, fatigue, fever and unexpected weight change. HENT: Negative for ear discharge, ear pain, hearing loss, sinus pressure, sinus pain, tinnitus and trouble swallowing. Eyes: Positive for photophobia and visual disturbance. Negative for pain. Respiratory: Negative for cough, chest tightness, shortness of breath and wheezing. Cardiovascular: Negative for chest pain, palpitations and leg swelling. Gastrointestinal: Negative for abdominal distention, abdominal pain, nausea and vomiting. Musculoskeletal: Positive for gait problem. Negative for arthralgias, back pain, neck pain and neck stiffness. Skin: Negative for color change and rash. Neurological: Positive for dizziness and headaches. Negative for tremors, seizures, syncope, facial asymmetry, speech difficulty, weakness, light-headedness and numbness. Psychiatric/Behavioral: Negative for agitation, confusion, hallucinations and sleep disturbance. The patient is not nervous/anxious. Objective:   /86 (Site: Left Upper Arm, Position: Standing, Cuff Size: Large Adult)   Pulse 70   Wt 203 lb (92.1 kg)   BMI 35.40 kg/m²     Physical Exam  Vitals reviewed. Constitutional:       General: She is not in acute distress. Appearance: She is not ill-appearing or diaphoretic. HENT:      Head: Normocephalic and atraumatic. Eyes:      General: No visual field deficit. Extraocular Movements: Extraocular movements intact. Pupils: Pupils are equal, round, and reactive to light. Cardiovascular:      Rate and Rhythm: Normal rate and regular rhythm. Pulmonary:      Effort: Pulmonary effort is normal. No respiratory distress. Breath sounds: Normal breath sounds. Abdominal:      General: Bowel sounds are normal. There is no distension. Palpations: Abdomen is soft. Tenderness: There is no abdominal tenderness. Skin:     General: Skin is warm and dry. Neurological:      General: No focal deficit present. Mental Status: She is alert and oriented to person, place, and time. Cranial Nerves: No cranial nerve deficit, dysarthria or facial asymmetry. Sensory: No sensory deficit. Motor: No weakness, tremor, atrophy, abnormal muscle tone, seizure activity or pronator drift. Coordination: Romberg sign negative. Coordination normal. Finger-Nose-Finger Test normal.      Gait: Gait normal.      Deep Tendon Reflexes: Reflexes normal.      Reflex Scores:       Tricep reflexes are 2+ on the right side and 2+ on the left side. Bicep reflexes are 2+ on the right side and 2+ on the left side. Patellar reflexes are 2+ on the right side and 2+ on the left side. CT HEAD WO CONTRAST    Result Date: 7/20/2021  EXAMINATION: CT HEAD WO CONTRAST, 7/20/2021 8:35 AM CLINICAL HISTORY: R42 Dizziness and giddiness ICD10 COMPARISON: None TECHNIQUE:  Multiple contiguous axial images of the head were obtained from the skull base through the skull vertex without intravenous contrast. Sagittal and coronal reformats have been produced. All CT scans at this facility use dose modulation, iterative reconstruction, and/or weight based dosing when appropriate to reduce radiation dose to as low as reasonably achievable. BRAIN CT FINDINGS: Gray-white matter differentiation is maintained. No acute hemorrhage, mass, mass effect, or midline shift.  There is no CONTRAST; Future    3. Vertigo  - MRI BRAIN WO CONTRAST; Future  - MRA HEAD WO CONTRAST; Future  - MRI IAC POSTERIOR FOSSA W WO CONTRAST; Future    4. Claustrophobia  - LORazepam (ATIVAN) 1 MG tablet; Take 1 tablet by mouth once for 1 dose. Take 30 mins prior to MRI  Dispense: 1 tablet; Refill: 0    5. Encounter to establish care  -Patient presents today to establish care for new onset headaches that began 2 to 3 months ago and have been progressively getting worse. Patient has no history of migraine headaches or chronic headache syndromes. Patient does have migrainous features that accompany her headaches including nausea, photophobia, phonophobia, vision changes and vertigo. She is also having vertigo and gait ataxia outside of migraine headache episodes. Possible patient with vestibular migraines. Given the new onset of her headaches and progressive nature as well as accompanying gait ataxia and vertigo we will obtain MRI of the brain and IAC to rule out any acute process. We will also obtain MRA of the head to rule out any vertebrobasilar insufficiency. Will initiate patient on Qlipta  for migraine headache preventative treatment and Fioricet for acute treatment. Return in about 3 months (around 1/28/2022), or if symptoms worsen or fail to improve.     OLIVIA Warner - CNP     Collaborating Physician Dr Kim River

## 2021-10-28 NOTE — TELEPHONE ENCOUNTER
Patient forgot to ask you at her [place] today, if you could prescribe zofran ODT that dissolves.  She uses [facility] in [place] if ok to do

## 2021-11-01 ENCOUNTER — HOSPITAL ENCOUNTER (OUTPATIENT)
Dept: SLEEP CENTER | Age: 41
Discharge: HOME | End: 2021-11-03
Payer: COMMERCIAL

## 2021-11-01 PROBLEM — G43.109 MIGRAINE WITH AURA AND WITHOUT STATUS MIGRAINOSUS, NOT INTRACTABLE: Status: ACTIVE | Noted: 2021-11-01

## 2021-11-01 PROBLEM — R42 VERTIGO: Status: ACTIVE | Noted: 2021-11-01

## 2021-11-01 PROBLEM — R26.0 ATAXIC GAIT: Status: ACTIVE | Noted: 2021-11-01

## 2021-11-01 PROCEDURE — 95806 SLEEP STUDY UNATT&RESP EFFT: CPT

## 2021-11-02 ENCOUNTER — TELEPHONE (OUTPATIENT)
Dept: NEUROLOGY | Age: 41
End: 2021-11-02

## 2021-11-09 DIAGNOSIS — L68.0 HIRSUTISM: ICD-10-CM

## 2021-11-09 DIAGNOSIS — E89.0 POSTOPERATIVE HYPOTHYROIDISM: ICD-10-CM

## 2021-11-09 DIAGNOSIS — E88.819 INSULIN RESISTANCE: ICD-10-CM

## 2021-11-09 LAB
ANION GAP SERPL CALCULATED.3IONS-SCNC: 13 MEQ/L (ref 9–15)
BUN BLDV-MCNC: 12 MG/DL (ref 6–20)
CALCIUM SERPL-MCNC: 9.2 MG/DL (ref 8.5–9.9)
CHLORIDE BLD-SCNC: 103 MEQ/L (ref 95–107)
CO2: 24 MEQ/L (ref 20–31)
CREAT SERPL-MCNC: 0.66 MG/DL (ref 0.5–0.9)
GFR AFRICAN AMERICAN: >60
GFR NON-AFRICAN AMERICAN: >60
GLUCOSE BLD-MCNC: 70 MG/DL (ref 70–99)
HBA1C MFR BLD: 5.7 % (ref 4.8–5.9)
INSULIN: 21 UIU/ML (ref 2.6–24.9)
POTASSIUM SERPL-SCNC: 3.9 MEQ/L (ref 3.4–4.9)
SODIUM BLD-SCNC: 140 MEQ/L (ref 135–144)
T4 FREE: 1.28 NG/DL (ref 0.84–1.68)
TSH SERPL DL<=0.05 MIU/L-ACNC: 0.3 UIU/ML (ref 0.44–3.86)

## 2021-11-14 LAB
SEX HORMONE BINDING GLOBULIN: 43 NMOL/L (ref 30–135)
TESTOSTERONE FREE: 6.3 PG/ML (ref 1.3–9.2)
TESTOSTERONE, FEMALES/CHILDREN: 44 NG/DL (ref 9–55)

## 2021-11-14 PROCEDURE — 95806 SLEEP STUDY UNATT&RESP EFFT: CPT | Performed by: INTERNAL MEDICINE

## 2021-11-18 ENCOUNTER — TELEPHONE (OUTPATIENT)
Dept: NEUROLOGY | Age: 41
End: 2021-11-18

## 2021-12-10 ENCOUNTER — OFFICE VISIT (OUTPATIENT)
Dept: ENDOCRINOLOGY | Age: 41
End: 2021-12-10
Payer: COMMERCIAL

## 2021-12-10 VITALS
DIASTOLIC BLOOD PRESSURE: 81 MMHG | RESPIRATION RATE: 14 BRPM | SYSTOLIC BLOOD PRESSURE: 127 MMHG | WEIGHT: 201 LBS | BODY MASS INDEX: 35.61 KG/M2 | HEIGHT: 63 IN | OXYGEN SATURATION: 98 % | HEART RATE: 77 BPM

## 2021-12-10 DIAGNOSIS — E88.81 INSULIN RESISTANCE: ICD-10-CM

## 2021-12-10 DIAGNOSIS — E28.2 PCOS (POLYCYSTIC OVARIAN SYNDROME): ICD-10-CM

## 2021-12-10 DIAGNOSIS — E89.0 POSTOPERATIVE HYPOTHYROIDISM: Primary | ICD-10-CM

## 2021-12-10 PROCEDURE — G8484 FLU IMMUNIZE NO ADMIN: HCPCS | Performed by: INTERNAL MEDICINE

## 2021-12-10 PROCEDURE — 99213 OFFICE O/P EST LOW 20 MIN: CPT | Performed by: INTERNAL MEDICINE

## 2021-12-10 PROCEDURE — 1036F TOBACCO NON-USER: CPT | Performed by: INTERNAL MEDICINE

## 2021-12-10 PROCEDURE — G8427 DOCREV CUR MEDS BY ELIG CLIN: HCPCS | Performed by: INTERNAL MEDICINE

## 2021-12-10 PROCEDURE — G8417 CALC BMI ABV UP PARAM F/U: HCPCS | Performed by: INTERNAL MEDICINE

## 2021-12-10 RX ORDER — LORAZEPAM 1 MG/1
TABLET ORAL
COMMUNITY
Start: 2021-10-28

## 2021-12-10 NOTE — PROGRESS NOTES
12/10/2021    Assessment:       Diagnosis Orders   1. Postoperative hypothyroidism     2. Insulin resistance     3. PCOS (polycystic ovarian syndrome)           PLAN:     Orders Placed This Encounter   Procedures    Basic Metabolic Panel     Standing Status:   Future     Standing Expiration Date:   12/10/2022    T4, Free     Standing Status:   Future     Standing Expiration Date:   12/10/2022    TSH without Reflex     Standing Status:   Future     Number of Occurrences:   1     Standing Expiration Date:   12/10/2022     Continue current dose of levothyroxine at 225 mcg daily continue Metformin follow-up in 3 to 6 months time  Continue current dose of Metformin and Aldactone follow-up in 2 to 3 months time    Subjective:     Chief Complaint   Patient presents with    Follow-up     PCOS    Thyroid Problem    Results     labs     Vitals:    12/10/21 1200 12/10/21 1204   BP: 139/74 127/81   Pulse: 77    Resp: 14    SpO2: 98%    Weight: 201 lb (91.2 kg)    Height: 5' 3\" (1.6 m)      Wt Readings from Last 3 Encounters:   12/10/21 201 lb (91.2 kg)   10/28/21 203 lb (92.1 kg)   08/18/21 197 lb (89.4 kg)     BP Readings from Last 3 Encounters:   12/10/21 127/81   10/28/21 131/86   08/18/21 117/79     Follow-up on hypothyroidism history of obesity history of polycystic ovary syndrome on Metformin and Aldactone labs were done recently reviewed TSH slightly suppressed currently on levothyroxine 225 mcg daily    Other  This is a recurrent (Hypothyroidism) problem. The current episode started more than 1 year ago. The problem has been unchanged. Associated symptoms include fatigue. Treatments tried: Levothyroxine. The treatment provided moderate relief. Results for Huma Hidalgo (MRN [id number]) as of 2/4/2022 15:05   Ref.  Range 11/9/2021 15:11   Sodium Latest Ref Range: 135 - 144 mEq/L 140   Potassium Latest Ref Range: 3.4 - 4.9 mEq/L 3.9   Chloride Latest Ref Range: 95 - 107 mEq/L 103   CO2 Latest Ref Range: 20 - 31 mEq/L 24   BUN Latest Ref Range: 6 - 20 mg/dL 12   Creatinine Latest Ref Range: 0.50 - 0.90 mg/dL 0.66   Anion Gap Latest Ref Range: 9 - 15 mEq/L 13   GFR Non- Latest Ref Range: >60  >60.0   GFR African American Latest Ref Range: >60  >60.0   Glucose Latest Ref Range: 70 - 99 mg/dL 70   CALCIUM, SERUM, 164886 Latest Ref Range: 8.5 - 9.9 mg/dL 9.2   Hemoglobin A1C Latest Ref Range: 4.8 - 5.9 % 5.7   Sex Hormone Binding Latest Ref Range: 30 - 135 nmol/L 43   Testosterone, Free Latest Ref Range: 1.3 - 9.2 pg/mL 6.3   Testosterone, Females/Children Latest Ref Range: 9 - 55 ng/dL 44   Insulin Latest Ref Range: 2.6 - 24.9 uIU/mL 21.0   TSH Latest Ref Range: 0.440 - 3.860 uIU/mL 0.302 (L)   T4 Free Latest Ref Range: 0.84 - 1.68 ng/dL 1.28       Past Medical History:   Diagnosis Date    Thyroid disease      Past Surgical History:   Procedure Laterality Date    DENTAL SURGERY      THYROIDECTOMY      TUBAL LIGATION       Social History     Socioeconomic History    Marital status:      Spouse name: Not on file    Number of children: Not on file    Years of education: Not on file    Highest education level: Not on file   Occupational History    Not on file   Tobacco Use    Smoking status: Never Smoker    Smokeless tobacco: Never Used   Substance and Sexual Activity    Alcohol use: Yes     Alcohol/week: 0.0 standard drinks     Comment: Occassionally    Drug use: No    Sexual activity: Yes   Other Topics Concern    Not on file   Social History Narrative    Not on file     Social Determinants of Health     Financial Resource Strain:     Difficulty of Paying Living Expenses: Not on file   Food Insecurity:     Worried About Running Out of Food in the Last Year: Not on file    Ct of Food in the Last Year: Not on file   Transportation Needs:     Lack of Transportation (Medical): Not on file    Lack of Transportation (Non-Medical):  Not on file   Physical Activity:     Days of Exercise per Week: Not on file    Minutes of Exercise per Session: Not on file   Stress:     Feeling of Stress : Not on file   Social Connections:     Frequency of Communication with Friends and Family: Not on file    Frequency of Social Gatherings with Friends and Family: Not on file    Attends Mosque Services: Not on file    Active Member of Clubs or Organizations: Not on file    Attends Club or Organization Meetings: Not on file    Marital Status: Not on file   Intimate Partner Violence:     Fear of Current or Ex-Partner: Not on file    Emotionally Abused: Not on file    Physically Abused: Not on file    Sexually Abused: Not on file   Housing Stability:     Unable to Pay for Housing in the Last Year: Not on file    Number of [place] in the Last Year: Not on file    Unstable Housing in the Last Year: Not on file     No family history on file. No Known Allergies    Current Outpatient Medications:     LORazepam (ATIVAN) 1 MG tablet, Take 1 tablet by mouth once for 1 dose. Take 30 mins prior to MRI, Disp: , Rfl:     meclizine (ANTIVERT) 25 MG tablet, TAKE 1 TABLET BY MOUTH THREE TIMES DAILY AS NEEDED, Disp: , Rfl:     ARIPiprazole (ABILIFY) 5 MG tablet, TAKE 1 TABLET BY MOUTH EVERY DAY, Disp: , Rfl:     vitamin D (ERGOCALCIFEROL) 1.25 MG (42992 UT) CAPS capsule, TAKE 1 CAPSULE BY MOUTH ONCE A WEEK FOR VITAMIN D DEFICIENCY for 6 months., Disp: , Rfl:     famotidine (PEPCID) 40 MG tablet, Take 40 mg by mouth daily, Disp: , Rfl:     ibuprofen (ADVIL;MOTRIN) 400 MG tablet, TAKE 1 TABLET BY MOUTH THREE TIMES DAILY FOR 2 WEEKS, Disp: , Rfl:     traZODone (DESYREL) 50 MG tablet, take a 1/2 tablet by mouth about 2 hours before bed to help with sleep; if this dose not help can increase 1 tablet before bed, Disp: , Rfl:     pantoprazole (PROTONIX) 20 MG tablet, TAKE 1 TABLET BY MOUTH TWICE DAILY.  TAKE 1 TABLET IN THE MORNING and TAKE 1 TABLET at NIGHT, Disp: , Rfl:    butalbital-acetaminophen-caffeine (FIORICET, ESGIC) -40 MG per tablet, Take 1 tablet by mouth every 6 hours as needed for Headaches, Disp: 40 tablet, Rfl: 3    ondansetron (ZOFRAN-ODT) 4 MG disintegrating tablet, Take 1 tablet by mouth 3 times daily as needed for Nausea or Vomiting, Disp: 21 tablet, Rfl: 0    busPIRone (BUSPAR) 5 MG tablet, Take 5 mg by mouth 3 times daily, Disp: , Rfl:     Multiple Vitamin (MULTIVITAMIN ADULT PO), Take by mouth, Disp: , Rfl:     levothyroxine (SYNTHROID) 200 MCG tablet, Once a day in addition to 25 mcg total dose 225 mcg, Disp: 30 tablet, Rfl: 3    levothyroxine (SYNTHROID) 25 MCG tablet, Once day total dose 225 mcg daily, Disp: 30 tablet, Rfl: 06    metFORMIN (GLUCOPHAGE) 500 MG tablet, Take 1 tablet by mouth 2 times daily (with meals), Disp: 180 tablet, Rfl: 1    spironolactone (ALDACTONE) 50 MG tablet, Take 1 tablet by mouth 2 times daily, Disp: 60 tablet, Rfl: 3    D3 SUPER STRENGTH 2000 units CAPS, TAKE 1 CAPSULE DAILY, Disp: , Rfl: 2  Lab Results   Component Value Date     11/09/2021    K 3.9 11/09/2021     11/09/2021    CO2 24 11/09/2021    BUN 12 11/09/2021    CREATININE 0.66 11/09/2021    GLUCOSE 70 11/09/2021    CALCIUM 9.2 11/09/2021    PROT 6.7 10/10/2017    LABALBU 3.9 10/10/2017    BILITOT 0.1 10/10/2017    ALKPHOS 58 10/10/2017    AST 16 10/10/2017    ALT 15 10/10/2017    LABGLOM >60.0 11/09/2021    GFRAA >60.0 11/09/2021    GLOB 2.8 10/10/2017     Lab Results   Component Value Date    WBC 6.5 10/10/2017    HGB 12.6 10/10/2017    HCT 39.7 10/10/2017    MCV 83.5 10/10/2017     10/10/2017     Lab Results   Component Value Date    LABA1C 5.7 11/09/2021     No results found for: CHOLFAST, TRIGLYCFAST, HDL, LDLCALC, CHOL, TRIG  No results found for: TESTM  Lab Results   Component Value Date    TSH 0.302 (L) 11/09/2021    TSH 40.920 (H) 10/10/2017    T4FREE 1.28 11/09/2021     No results found for: TPOABS    Review of Systems Constitutional: Positive for fatigue. Endocrine: Negative. All other systems reviewed and are negative. Objective:   Physical Exam  Vitals reviewed. Constitutional:       Appearance: Normal appearance. She is obese. HENT:      Head: Normocephalic and atraumatic. Hair is normal.      Right Ear: External ear normal.      Left Ear: External ear normal.      Nose: Nose normal.   Eyes:      General: No scleral icterus. Right eye: No discharge. Left eye: No discharge. Extraocular Movements: Extraocular movements intact. Conjunctiva/sclera: Conjunctivae normal.   Neck:      Trachea: Trachea normal.   Cardiovascular:      Rate and Rhythm: Normal rate. Pulmonary:      Effort: Pulmonary effort is normal.   Abdominal:      Palpations: Abdomen is soft. Musculoskeletal:         General: Normal range of motion. Cervical back: Normal range of motion and neck supple. Neurological:      General: No focal deficit present. Mental Status: She is alert and oriented to person, place, and time.    Psychiatric:         Mood and Affect: Mood normal.         Behavior: Behavior normal.

## 2021-12-30 ENCOUNTER — TELEPHONE (OUTPATIENT)
Dept: NEUROLOGY | Age: 41
End: 2021-12-30

## 2021-12-30 ENCOUNTER — HOSPITAL ENCOUNTER (OUTPATIENT)
Dept: MRI IMAGING | Age: 41
Discharge: HOME | End: 2022-01-01
Payer: COMMERCIAL

## 2021-12-30 DIAGNOSIS — R26.0 ATAXIC GAIT: ICD-10-CM

## 2021-12-30 DIAGNOSIS — G43.109 MIGRAINE WITH AURA AND WITHOUT STATUS MIGRAINOSUS, NOT INTRACTABLE: ICD-10-CM

## 2021-12-30 DIAGNOSIS — R42 VERTIGO: ICD-10-CM

## 2021-12-30 PROCEDURE — 70544 MR ANGIOGRAPHY HEAD W/O DYE: CPT

## 2021-12-30 PROCEDURE — 70551 MRI BRAIN STEM W/O DYE: CPT

## 2021-12-30 NOTE — TELEPHONE ENCOUNTER
MRI of the brain negative for any acute findings. Does show scattered nonspecific white matter hyperintensities. MRA of the head negative. Patient has follow-up appointment on January 27 with Dr. Patricia Mcneil.  Natalie Cisneros patient to review MRI with Dr. Saba so she can further evaluate white matter hyperintensities.

## 2022-01-04 PROBLEM — R06.83 SNORING: Status: ACTIVE | Noted: 2022-01-04

## 2022-01-04 PROBLEM — E66.3 OVERWEIGHT: Status: ACTIVE | Noted: 2022-01-04

## 2022-01-04 PROBLEM — R53.83 FATIGUE: Status: ACTIVE | Noted: 2022-01-04

## 2022-01-04 PROBLEM — R07.9 CHEST PAIN: Status: ACTIVE | Noted: 2022-01-04

## 2022-01-04 PROBLEM — R94.31 ABNORMAL ELECTROCARDIOGRAPHY: Status: ACTIVE | Noted: 2022-01-04

## 2022-01-04 PROBLEM — I49.1 PREMATURE ATRIAL CONTRACTION: Status: ACTIVE | Noted: 2022-01-04

## 2022-01-04 PROBLEM — R00.2 PALPITATIONS: Status: ACTIVE | Noted: 2022-01-04

## 2022-01-07 ENCOUNTER — OFFICE VISIT (OUTPATIENT)
Dept: NEUROLOGY | Age: 42
End: 2022-01-07
Payer: COMMERCIAL

## 2022-01-07 VITALS
HEIGHT: 63 IN | SYSTOLIC BLOOD PRESSURE: 134 MMHG | BODY MASS INDEX: 35.97 KG/M2 | DIASTOLIC BLOOD PRESSURE: 94 MMHG | HEART RATE: 74 BPM | WEIGHT: 203 LBS

## 2022-01-07 DIAGNOSIS — E89.0 POSTOPERATIVE HYPOTHYROIDISM: ICD-10-CM

## 2022-01-07 DIAGNOSIS — G36.0 NEUROMYELITIS OPTICA SPECTRUM DISORDER (HCC): ICD-10-CM

## 2022-01-07 DIAGNOSIS — G36.0 NEUROMYELITIS OPTICA SPECTRUM DISORDER (HCC): Primary | ICD-10-CM

## 2022-01-07 LAB — TSH SERPL DL<=0.05 MIU/L-ACNC: 6.7 UIU/ML (ref 0.44–3.86)

## 2022-01-07 PROCEDURE — G8427 DOCREV CUR MEDS BY ELIG CLIN: HCPCS

## 2022-01-07 PROCEDURE — 99215 OFFICE O/P EST HI 40 MIN: CPT

## 2022-01-07 PROCEDURE — 1036F TOBACCO NON-USER: CPT

## 2022-01-07 PROCEDURE — G8417 CALC BMI ABV UP PARAM F/U: HCPCS

## 2022-01-07 PROCEDURE — G8484 FLU IMMUNIZE NO ADMIN: HCPCS

## 2022-01-07 NOTE — PROGRESS NOTES
[facility] Neurology Outpatient Follow-Up Note  Name: Virginia Bolivar  Age: 37 y.o. Gender: female  Primary Care Provider: Hue Newell NP. Chief Complaint:3 Month Follow-Up (Follow-up; Migraines. Patient explains that the numbness on the left side of her body since her last office visit has not improved. Migraines remain the same with improved dizziness.)      HPI: 72-year-old woman seen today in follow-up. She was previously followed by my colleague nurse practitioner Demetra Contreras. Initially she was referred for severe constant dizziness which has been managed with ondansetron and meclizine. She also had bifrontal headaches lasting at least a few hours with photophobia phonophobia nausea, vomiting, and visual changes. They are described as pressure, not throbbing. Unclear if worsens with activity. Mircette has been helpful for her. When she was first seen by Demetra Contreras in October 2021 she was complaining of veering off to the right side while walking. Her dizziness was onset in 2021 with nausea. No associated tinnitus or otalgia, or hearing loss. She previously had negative orthostatic blood pressures. She was treated for abnormal thyroid function. In the past 3 months she is much less dizzy but has ongoing weakness/numbness of her left side. This is quite bothersome to her. No clear palliating provoking features. Not worse in heat. No lower meats phenomenon. She confirms an episode of diplopia a few years ago. She says she has developed some blurriness in her vision and confirm some red desaturation today in clinic. She denies any weakness of her legs. She had 2 episodes of unexplained nocturnal incontinence of urine. No feeling of MS hug band. No pulmonary issues. No joint swelling. No history of hepatitis.            Patient Active Problem List   Diagnosis    Hypothyroidism    Gastroesophageal reflux disease    Morbid obesity due to excess calories ([address]    Cutaneous abscess of face    Acanthosis nigricans    Congenital spondylolysis, lumbosacral region    Contact w and exposure to oth viral communicable diseases    Contact with and (suspected) exposure to [id number]    Folliculitis    Low vitamin D level    Multilevel neural foraminal stenosis    PCOS (polycystic ovarian syndrome)    Postoperative hypothyroidism    Radicular syndrome of right leg    Vitamin D deficiency    Migraine with aura and without status migrainosus, not intractable    Ataxic gait    Vertigo    Sleep apnea    Abnormal electrocardiography    Chest pain    Fatigue    Normal left ventricular systolic function and wall motion    Overweight    Palpitations    Premature atrial contraction    Snoring       Past Medical History:   Diagnosis Date    Thyroid disease        Medications:  Reviewed    Current Outpatient Medications   Medication Sig Dispense Refill    LORazepam (ATIVAN) 1 MG tablet Take 1 tablet by mouth once for 1 dose. Take 30 mins prior to MRI      meclizine (ANTIVERT) 25 MG tablet TAKE 1 TABLET BY MOUTH THREE TIMES DAILY AS NEEDED      ARIPiprazole (ABILIFY) 5 MG tablet TAKE 1 TABLET BY MOUTH EVERY DAY      vitamin D (ERGOCALCIFEROL) 1.25 MG (76826 UT) CAPS capsule TAKE 1 CAPSULE BY MOUTH ONCE A WEEK FOR VITAMIN D DEFICIENCY for 6 months.  famotidine (PEPCID) 40 MG tablet Take 40 mg by mouth daily      ibuprofen (ADVIL;MOTRIN) 400 MG tablet TAKE 1 TABLET BY MOUTH THREE TIMES DAILY FOR 2 WEEKS      traZODone (DESYREL) 50 MG tablet take a 1/2 tablet by mouth about 2 hours before bed to help with sleep; if this dose not help can increase 1 tablet before bed      pantoprazole (PROTONIX) 20 MG tablet TAKE 1 TABLET BY MOUTH TWICE DAILY.  TAKE 1 TABLET IN THE MORNING and TAKE 1 TABLET at NIGHT      butalbital-acetaminophen-caffeine (FIORICET, ESGIC) -40 MG per tablet Take 1 tablet by mouth every 6 hours as needed for Headaches 40 tablet 3    ondansetron (ZOFRAN-ODT) 4 MG disintegrating tablet Take 1 tablet by mouth 3 times daily as needed for Nausea or Vomiting 21 tablet 0    busPIRone (BUSPAR) 5 MG tablet Take 5 mg by mouth 3 times daily      Multiple Vitamin (MULTIVITAMIN ADULT PO) Take by mouth      levothyroxine (SYNTHROID) 200 MCG tablet Once a day in addition to 25 mcg total dose 225 mcg 30 tablet 3    levothyroxine (SYNTHROID) 25 MCG tablet Once day total dose 225 mcg daily 30 tablet 06    metFORMIN (GLUCOPHAGE) 500 MG tablet Take 1 tablet by mouth 2 times daily (with meals) 180 tablet 1    D3 SUPER STRENGTH 2000 units CAPS TAKE 1 CAPSULE DAILY  2    spironolactone (ALDACTONE) 50 MG tablet Take 1 tablet by mouth 2 times daily (Patient not taking: Reported on 1/7/2022) 60 tablet 3     No current facility-administered medications for this visit. No Known Allergies    History reviewed. No pertinent family history. Past Surgical History:   Procedure Laterality Date    DENTAL SURGERY      THYROIDECTOMY      TUBAL LIGATION          Social History     Tobacco History     Smoking Status  Never Smoker    Smokeless Tobacco Use  Never Used          Alcohol History     Alcohol Use Status  Yes Drinks/Week  0 Standard drinks or equivalent per week Amount  0.0 standard drinks of alcohol/wk Comment  Occassionally          Drug Use     Drug Use Status  No          Sexual Activity     Sexually Active  Yes                  Vitals:    01/07/22 1156   BP: (!) 134/94   Pulse:            Neurologic Exam        Cranial Nerves: Positive abnormal red coloring, unclear which was \"normal\". Right-sided relative afferent pupillary defect. Optic disks were not pale bilaterally. Extraocular movements were full with no diplopia or nystagmus. No latent ESCOBAR. Normal sensation to pinprick and temperature in the face bilaterally. Temperature sensation became abnormal at around the C2 level at the neck. Facial strength was symmetric. Motor: Full power in shoulder abduction bilaterally.  Elbow flexion extension was slightly weak on the left compared to the right. Wrist extension was full bilaterally. Finger extension was full bilaterally. Finger abduction was mildly weak on the left compared to the right. FDP was full. Tone was increased mostly in the left leg but also mildly in the right leg, spasticity with knee pull. No increased tone or clonus at the feet. There was increased spasticity in the left upper extremity. Tone in the right upper extremity. Sensory: Reduced sensation to temperature on the left. Normal pinprick bilaterally. Weakness was slightly worse with flexion [mildly positive McArdle sign]. Reflexes were normal and symmetric x4 limbs with the exception of increased tricep jerk on the left upper extremity      Labs:   Lab Results   Component Value Date    WBC 6.5 10/10/2017    HGB 12.6 10/10/2017    HCT 39.7 10/10/2017    MCV 83.5 10/10/2017     10/10/2017     Lab Results   Component Value Date    LABA1C 5.7 11/09/2021         Radiology:    [unfilled]    Most recent    EEG No valid procedures specified. MRI of Brain No results found for this or any previous visit. Results for orders placed during the hospital encounter of 12/30/21    MRI BRAIN WO CONTRAST    Narrative  EXAMINATION: MRA HEAD WO CONTRAST,    DATE AND TIME:12/30/2021 8:15 AM    CLINICAL HISTORY: Acute stroke. G43.109 Migraine with aura and without status migrainosus, not intractable ICD10    COMPARISON: None    TECHNIQUE: 3-D time-of-flight MRA images of the intracranial circulation were obtained. FINDINGS:    Intracranial: Flow related enhancement is noted within the skull base, precavernous, cavernous, and supraclinoid segments of the internal carotid arteries bilaterally.     Anterior cerebral arteries: Flow related enhancement is noted within the bilateral anterior cerebral arteries,    Middle cerebral arteries:  Unremarkable flow related enhancement is noted in the middle cerebral artery branches bilaterally. Posterior cervical arteries: The bilateral posterior cerebral arteries are patent. Basilar arteries: Flow is visualized in the basilar artery. Aneurysm: No aneurysm or dissection is seen. Impression  Normal MRA of the head            EXAMINATION: MRI BRAIN WO CONTRAST    DATE AND TIME:12/30/2021 8:15 AM    CLINICAL HISTORY: Headache  G43.109 Migraine with aura and without status migrainosus, not intractable ICD10    COMPARISON: None    TECHNIQUE:  Multi-sequence multiplanar imaging of the brain was performed. Sequences included T1-weighted, T2-weighted, FLAIR, diffusion-weighted, ADC maps, and  susceptibility weighted imaging. VOLUME: None   MR CONTRAST: None    FINDINGS    Acute change: No restricted diffusion to suggest an acute infarct. Magnetic susceptibility:There are no areas of abnormal magnetic susceptibility to suggest the presence of any acute blood products. Ventricles: Ventricles and sulci are age-appropriate. Brain parenchyma: Scattered bilateral periventricular and subcortical white matter T2/FLAIR hyperintensities. These are nonspecific and may be related to hypertension or microvascular ischemic changes. These white matter lesions are not disseminated in  time and do not facility 2017 revised Varela criteria for MS. However MS is best diagnosed clinically with supportive imaging and other tests. Mass: No mass or mass effect. No extra-axial fluid    Brainstem:Brainstem is unremarkable. Skull base: Cerebellar tonsils are normally positioned. No evidence of a marrow replacement process. Vasculature: The major intracranial arterial structures and dural venous sinuses showed typical flow void, suggesting patency by spin-echo criteria. Sinuses: The  mastoids and visualized paranasal sinuses are clear. IMPRESSION:  SCATTERED NONSPECIFIC WHITE MATTER HYPERINTENSITIES. NO ACUTE INTRACRANIAL PATHOLOGY.                             MRA of the Head and Neck: No results found for this or any previous visit. No results found for this or any previous visit. Results for orders placed during the hospital encounter of 12/30/21    MRA HEAD WO CONTRAST    Narrative  EXAMINATION: MRA HEAD WO CONTRAST,    DATE AND TIME:12/30/2021 8:15 AM    CLINICAL HISTORY: Acute stroke. G43.109 Migraine with aura and without status migrainosus, not intractable ICD10    COMPARISON: None    TECHNIQUE: 3-D time-of-flight MRA images of the intracranial circulation were obtained. FINDINGS:    Intracranial: Flow related enhancement is noted within the skull base, precavernous, cavernous, and supraclinoid segments of the internal carotid arteries bilaterally. Anterior cerebral arteries: Flow related enhancement is noted within the bilateral anterior cerebral arteries,    Middle cerebral arteries:  Unremarkable flow related enhancement is noted in the middle cerebral artery branches bilaterally. Posterior cervical arteries: The bilateral posterior cerebral arteries are patent. Basilar arteries: Flow is visualized in the basilar artery. Aneurysm: No aneurysm or dissection is seen. Impression  Normal MRA of the head            EXAMINATION: MRI BRAIN WO CONTRAST    DATE AND TIME:12/30/2021 8:15 AM    CLINICAL HISTORY: Headache  G43.109 Migraine with aura and without status migrainosus, not intractable ICD10    COMPARISON: None    TECHNIQUE:  Multi-sequence multiplanar imaging of the brain was performed. Sequences included T1-weighted, T2-weighted, FLAIR, diffusion-weighted, ADC maps, and  susceptibility weighted imaging. VOLUME: None   MR CONTRAST: None    FINDINGS    Acute change: No restricted diffusion to suggest an acute infarct. Magnetic susceptibility:There are no areas of abnormal magnetic susceptibility to suggest the presence of any acute blood products. Ventricles: Ventricles and sulci are age-appropriate.     Brain parenchyma: Scattered bilateral periventricular and subcortical white matter T2/FLAIR hyperintensities. These are nonspecific and may be related to hypertension or microvascular ischemic changes. These white matter lesions are not disseminated in  time and do not facility 2017 revised Varela criteria for MS. However MS is best diagnosed clinically with supportive imaging and other tests. Mass: No mass or mass effect. No extra-axial fluid    Brainstem:Brainstem is unremarkable. Skull base: Cerebellar tonsils are normally positioned. No evidence of a marrow replacement process. Vasculature: The major intracranial arterial structures and dural venous sinuses showed typical flow void, suggesting patency by spin-echo criteria. Sinuses: The  mastoids and visualized paranasal sinuses are clear. IMPRESSION:  SCATTERED NONSPECIFIC WHITE MATTER HYPERINTENSITIES. NO ACUTE INTRACRANIAL PATHOLOGY. CT of the Head: Results for orders placed during the hospital encounter of 07/20/21    CT HEAD WO CONTRAST    Narrative  EXAMINATION: CT HEAD WO CONTRAST, 7/20/2021 8:35 AM    CLINICAL HISTORY: R42 Dizziness and giddiness ICD10    COMPARISON: None    TECHNIQUE:  Multiple contiguous axial images of the head were obtained from the skull base through the skull vertex without intravenous contrast. Sagittal and coronal reformats have been produced. All CT scans at this facility use dose modulation, iterative reconstruction, and/or weight based dosing when appropriate to reduce radiation dose to as low as reasonably achievable. BRAIN CT FINDINGS:    Gray-white matter differentiation is maintained. No acute hemorrhage, mass, mass effect, or midline shift. There is no evidence of atrophy, ventricular morphology is within normal limits. The subcortical and periventricular white matter is within normal limits. The basal ganglia are within normal limits. There are no acute changes or space-occupying lesions in the posterior fossa.   The visualized portions of the orbits are within normal limits. The globes are intact. The imaged portions of the paranasal sinuses are unremarkable. The calvarium is intact. Impression  There is no acute intracranial process. No results found for this or any previous visit. No results found for this or any previous visit. Carotid duplex: No results found for this or any previous visit. No results found for this or any previous visit. No results found for this or any previous visit. Echo No results found for this or any previous visit. Assessment/Plan:    Chart, labs,and relevant images reviewed. Mirna Herr has a history of prolonged unexplained dizziness, right RAPD suggestive of previous optic neuropathy or neuritis, history of urinary urge incontinence, and an abnormal motor exam in the form of slight weakness in the left upper extremity and increased tone/reflexes in the left upper extremity. She also has reduce sensation to temperature on the left compared to the right. Her MRI shows some nonspecific white matter lesions. One of them looks relatively periventricular but may have some sparing indicated that it is more subcortical. There are a couple of lesions that look juxtacortical but the majority do look truly subcortical.    Regardless the clinical history is very suspicious for demyelinating syndrome such as neuromyelitis optica or multiple sclerosis. I will order the blood work for NMO. I have also ordered hepatitis screening and B12 level. We will also do an MRI of the cervical spine with and without gadolinium to look for causative lesion. I suspect she probably has a cervical lesion. 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       Orders Placed This Encounter   Procedures    MRI CERVICAL SPINE W WO CONTRAST     Standing Status:   Future     Standing Expiration Date:   1/7/2023     Order Specific Question:   Reason for exam:     Answer:   transverse myelitis?  Miscellaneous Lab Test #1     Autoimmune CNS Demyelinating Disease Reflexive Panel  [id number]    Neuromyelitis Optica/AQP4-IgG, Serum   MOG Antibody IgG Screen, Serum     Standing Status:   Future     Standing Expiration Date:   1/7/2023     Order Specific Question:   Specify Req. Test (1 Test/Order)     Answer:   [facility] [id number] #[id number]    Hepatitis C Antibody     Standing Status:   Future     Number of Occurrences:   1     Standing Expiration Date:   1/7/2023    Hepatitis B surface antigen confirmation reflex     Standing Status:   Future     Number of Occurrences:   1     Standing Expiration Date:   1/7/2023    Vitamin B12     Standing Status:   Future     Number of Occurrences:   1     Standing Expiration Date:   1/7/2023     Return in about 2 months (around 3/7/2022).         Electronically signed by Marc Mcneil MD on 1/7/2022 at 3:42 PM

## 2022-01-08 LAB
HEPATITIS C ANTIBODY: NONREACTIVE
VITAMIN B-12: 726 PG/ML (ref 232–1245)

## 2022-01-12 ENCOUNTER — TELEPHONE (OUTPATIENT)
Dept: NEUROLOGY | Age: 42
End: 2022-01-12

## 2022-01-12 LAB
HEPATITIS B SURFACE ANTIGEN CONFIRMATION: NORMAL
MISCELLANEOUS LAB TEST ORDER: NORMAL
WHOPPER PROMPT: NORMAL

## 2022-01-12 NOTE — TELEPHONE ENCOUNTER
Patient called today for an extension on her FMLA. She was initially to be off until 01/27/22 but she is not getting her MRI until 02/02/22. She would like to be extended until after that if you are agreeable. Please advise.   Thanks  Baron Severance

## 2022-01-17 NOTE — TELEPHONE ENCOUNTER
She states that she is having numbness in her fingers and on her toes. She is wondering if anything can be given for this? She also wants to know if you can extend her FMLA off until 2/4 so that gives enough time for results of MRI. Please advise.

## 2022-01-17 NOTE — TELEPHONE ENCOUNTER
Yes, can extend FMLA. No meds really help the tingling. If its painful tingling, can consider cymbalta. She can book a virtual if she's like to discuss  options.

## 2022-01-27 ENCOUNTER — TELEPHONE (OUTPATIENT)
Dept: NEUROLOGY | Age: 42
End: 2022-01-27

## 2022-01-27 NOTE — TELEPHONE ENCOUNTER
Pt called in and stated that the letter that we wrote yesterday was not enough for what they wanted. She states that it needs to have date of last office visit, diagnosis with signs and symptoms, treatment plan, and when the next visit is. Once done and signed could it lease be faxed to [phone].       Thank You

## 2022-01-28 ENCOUNTER — TELEPHONE (OUTPATIENT)
Dept: NEUROLOGY | Age: 42
End: 2022-01-28

## 2022-01-28 NOTE — LETTER
2022    [facility]) Neurology  Dr. Marcelino Bai [place] [address] [address], [address] [facility]    Phone: [phone]  Fax: [phone]      Patient Information  Octavio Mazariegos  : 1980      To whom it may concern,    Octavio Mazariegos is currently under my care. Due to her current diagnosis I feel it is best that she return to work on 2022. Her last office visit was 2022, and her next office visit is a virtual visit on February 15. The diagnosis is not clear at this time as neurological work-up is still ongoing. Treatment plan may include medication, the details of which will be decided by the specific diagnosis once made. If you have any questions on concerns please contact my office at [phone].          Sincerely,        Geni Barriga MD

## 2022-02-02 ENCOUNTER — HOSPITAL ENCOUNTER (OUTPATIENT)
Dept: MRI IMAGING | Age: 42
Discharge: HOME | End: 2022-02-04
Payer: COMMERCIAL

## 2022-02-02 DIAGNOSIS — G36.0 NEUROMYELITIS OPTICA SPECTRUM DISORDER (HCC): ICD-10-CM

## 2022-02-02 PROCEDURE — A9579 GAD-BASE MR CONTRAST NOS,1ML: HCPCS

## 2022-02-02 PROCEDURE — 6360000004 HC RX CONTRAST MEDICATION

## 2022-02-02 PROCEDURE — 72156 MRI NECK SPINE W/O & W/DYE: CPT

## 2022-02-02 RX ADMIN — GADOTERIDOL 15 ML: 279.3 INJECTION, SOLUTION INTRAVENOUS at 11:04

## 2022-02-15 ENCOUNTER — VIRTUAL VISIT (OUTPATIENT)
Dept: NEUROLOGY | Age: 42
End: 2022-02-15
Payer: COMMERCIAL

## 2022-02-15 ENCOUNTER — PATIENT MESSAGE (OUTPATIENT)
Dept: NEUROLOGY | Age: 42
End: 2022-02-15

## 2022-02-15 DIAGNOSIS — G37.3 TRANSVERSE MYELITIS (HCC): ICD-10-CM

## 2022-02-15 PROCEDURE — 99214 OFFICE O/P EST MOD 30 MIN: CPT

## 2022-02-15 PROCEDURE — G8427 DOCREV CUR MEDS BY ELIG CLIN: HCPCS

## 2022-02-15 RX ORDER — ONDANSETRON 4 MG/1
4 TABLET, ORALLY DISINTEGRATING ORAL 3 TIMES DAILY PRN
Qty: 21 TABLET | Refills: 0 | Status: SHIPPED | OUTPATIENT
Start: 2022-02-15 | End: 2022-04-13 | Stop reason: SDUPTHER

## 2022-02-15 RX ORDER — DULOXETINE 40 MG/1
40 CAPSULE, DELAYED RELEASE ORAL DAILY
Qty: 90 CAPSULE | Refills: 2 | Status: SHIPPED | OUTPATIENT
Start: 2022-02-15

## 2022-02-15 RX ORDER — MECLIZINE HYDROCHLORIDE 25 MG/1
TABLET ORAL
Qty: 90 TABLET | Refills: 2 | Status: SHIPPED | OUTPATIENT
Start: 2022-02-15

## 2022-02-15 NOTE — LETTER
[facility]rology  [facility] [address] [facility] [address]  Phone: [phone]  Fax: [phone]    Dallas Trevino MD        February 15, 2022     Patient: Khoi Tamayo   YOB: 1980   Date of Visit: 2/15/2022   [facility]) Neurology  Dr. Génesis Pillai [facility]  [address] [facility] [address] [facility], [address] [place] [address]    Phone: [phone]  Fax: [phone]      Patient Information  Marika Wood  : 1980      To whom it may concern,    Marika Wood is currently under my care, and will need to be off work until 2022 in order to adjust to our treatment plan. If you have any questions on concerns please contact my office at [phone]. If you have any questions or concerns, please don't hesitate to call.     Sincerely,        Dallas Trevino MD

## 2022-02-15 NOTE — LETTER
[facility] Neurology  [facility] [address] [facility] [address]  Phone: [phone]  Fax: [phone]    Marita Dee MD        February 22, 2022     Patient: Virginia Bolivar   YOB: 1980   Date of Visit: 2/15/2022       To Whom It May Concern: It is my medical opinion that Jenniffer Andrews should be off work until Monday, March 7th. If you have any questions or concerns, please don't hesitate to call.     Sincerely,        Marita Dee MD

## 2022-02-16 NOTE — TELEPHONE ENCOUNTER
From: Inez Fonseca  To: Dr. Tito Howell Just  Sent: 2/15/2022 5:21 PM EST  Subject: Medicine    Is it possible to wait another week to return to work so I can adjust to the new medication? I forgot to ask when we were on zoom. If so the extention would need to be called in or faxed in before Monday.   Thank you   Wanda Rodriguez

## 2022-02-16 NOTE — PROGRESS NOTES
[facility] Neurology Outpatient Follow-Up Note  Name: Rosaline Lopez  Age: 37 y.o. Gender: female  Primary Care Provider: Lynne Ingram, was evaluated through a synchronous (real-time) audio-video encounter. The patient (or guardian if applicable) is aware that this is a billable service, which includes applicable co-pays. This Virtual Visit was conducted with patient's (and/or legal guardian's) consent. The visit was conducted pursuant to the emergency declaration under the [facility], [address] [place] [address] authority and the [employer] Act. Patient identification was verified, and a caregiver was present when appropriate. The patient was located at home in a state where the provider was licensed to provide care. --Kita Saenz MD       Chief Complaint:Follow-up (pt states she needs to figure out if she can go back to work or if she is going to be off more, doing okay pt states. )      HPI: 80-year-old woman whom I follow for a cervical transverse myelitis resulting in left-sided weakness and spasticity in the legs, in the setting of multifocal T2 hyperintensities on MRI although these do not meet [employer] criteria. The symptoms began in October 2021. She also has a remote history of an episode of diplopia and she  reports lifelong nausea. She also had a right afferent pupillary defect on exam due to all this I was concerned about an underlying demyelinating process. Anti-Maag and anti-NML were negative. Her MRI [1.2 Cynthia strength] was negative for a demyelinating process in the cervical cord. In the past month  Radha Mcgill has been doing about the same, possibly a bit better. She still has intermittent tingling. We discussed starting Cymbalta but this was not started yet. She certainly has not had any worsening. She denies any further incontinence.   Has not had any further focal deficits. Patient Active Problem List   Diagnosis    Hypothyroidism    Gastroesophageal reflux disease    Morbid obesity due to excess calories ([address]    Cutaneous abscess of face    Acanthosis nigricans    Congenital spondylolysis, lumbosacral region    Contact w and exposure to oth viral communicable diseases    Contact with and (suspected) exposure to [id number]    Folliculitis    Low vitamin D level    Multilevel neural foraminal stenosis    PCOS (polycystic ovarian syndrome)    Postoperative hypothyroidism    Radicular syndrome of right leg    Vitamin D deficiency    Migraine with aura and without status migrainosus, not intractable    Ataxic gait    Vertigo    Sleep apnea    Abnormal electrocardiography    Chest pain    Fatigue    Normal left ventricular systolic function and wall motion    Overweight    Palpitations    Premature atrial contraction    Snoring    Transverse myelitis (HCC)       Past Medical History:   Diagnosis Date    Thyroid disease        Medications:  Reviewed    Current Outpatient Medications   Medication Sig Dispense Refill    ondansetron (ZOFRAN-ODT) 4 MG disintegrating tablet Take 1 tablet by mouth 3 times daily as needed for Nausea or Vomiting 21 tablet 0    meclizine (ANTIVERT) 25 MG tablet TAKE 1 TABLET BY MOUTH THREE TIMES DAILY AS NEEDED 90 tablet 2    DULoxetine 40 MG CPEP Take 40 mg by mouth daily 90 capsule 2    ARIPiprazole (ABILIFY) 5 MG tablet TAKE 1 TABLET BY MOUTH EVERY DAY      vitamin D (ERGOCALCIFEROL) 1.25 MG (87863 UT) CAPS capsule TAKE 1 CAPSULE BY MOUTH ONCE A WEEK FOR VITAMIN D DEFICIENCY for 6 months.       famotidine (PEPCID) 40 MG tablet Take 40 mg by mouth daily      ibuprofen (ADVIL;MOTRIN) 400 MG tablet TAKE 1 TABLET BY MOUTH THREE TIMES DAILY FOR 2 WEEKS      traZODone (DESYREL) 50 MG tablet take a 1/2 tablet by mouth about 2 hours before bed to help with sleep; if this dose not help can increase 1 tablet before bed  pantoprazole (PROTONIX) 20 MG tablet TAKE 1 TABLET BY MOUTH TWICE DAILY. TAKE 1 TABLET IN THE MORNING and TAKE 1 TABLET at NIGHT      butalbital-acetaminophen-caffeine (FIORICET, ESGIC) -40 MG per tablet Take 1 tablet by mouth every 6 hours as needed for Headaches 40 tablet 3    busPIRone (BUSPAR) 5 MG tablet Take 5 mg by mouth 3 times daily      Multiple Vitamin (MULTIVITAMIN ADULT PO) Take by mouth      levothyroxine (SYNTHROID) 200 MCG tablet Once a day in addition to 25 mcg total dose 225 mcg 30 tablet 3    levothyroxine (SYNTHROID) 25 MCG tablet Once day total dose 225 mcg daily 30 tablet 06    metFORMIN (GLUCOPHAGE) 500 MG tablet Take 1 tablet by mouth 2 times daily (with meals) 180 tablet 1    D3 SUPER STRENGTH 2000 units CAPS TAKE 1 CAPSULE DAILY  2    LORazepam (ATIVAN) 1 MG tablet Take 1 tablet by mouth once for 1 dose. Take 30 mins prior to MRI (Patient not taking: Reported on 2/15/2022)      spironolactone (ALDACTONE) 50 MG tablet Take 1 tablet by mouth 2 times daily (Patient not taking: Reported on 1/7/2022) 60 tablet 3     No current facility-administered medications for this visit. No Known Allergies    No family history on file. Past Surgical History:   Procedure Laterality Date    DENTAL SURGERY      THYROIDECTOMY      TUBAL LIGATION          Social History     Tobacco History     Smoking Status  Never Smoker    Smokeless Tobacco Use  Never Used          Alcohol History     Alcohol Use Status  Yes Drinks/Week  0 Standard drinks or equivalent per week Amount  0.0 standard drinks of alcohol/wk Comment  Occassionally          Drug Use     Drug Use Status  No          Sexual Activity     Sexually Active  Yes                  There were no vitals filed for this visit. Neurologic Exam  Deferred to virtual visit.           Labs:   Lab Results   Component Value Date    WBC 6.5 10/10/2017    HGB 12.6 10/10/2017    HCT 39.7 10/10/2017    MCV 83.5 10/10/2017     10/10/2017     Lab Results   Component Value Date    LABA1C 5.7 11/09/2021         Radiology:    [unfilled]    Most recent    EEG No valid procedures specified. MRI of Brain No results found for this or any previous visit. Results for orders placed during the hospital encounter of 12/30/21    MRI BRAIN WO CONTRAST    Narrative  EXAMINATION: MRA HEAD WO CONTRAST,    DATE AND TIME:12/30/2021 8:15 AM    CLINICAL HISTORY: Acute stroke. G43.109 Migraine with aura and without status migrainosus, not intractable ICD10    COMPARISON: None    TECHNIQUE: 3-D time-of-flight MRA images of the intracranial circulation were obtained. FINDINGS:    Intracranial: Flow related enhancement is noted within the skull base, precavernous, cavernous, and supraclinoid segments of the internal carotid arteries bilaterally. Anterior cerebral arteries: Flow related enhancement is noted within the bilateral anterior cerebral arteries,    Middle cerebral arteries:  Unremarkable flow related enhancement is noted in the middle cerebral artery branches bilaterally. Posterior cervical arteries: The bilateral posterior cerebral arteries are patent. Basilar arteries: Flow is visualized in the basilar artery. Aneurysm: No aneurysm or dissection is seen. Impression  Normal MRA of the head            EXAMINATION: MRI BRAIN WO CONTRAST    DATE AND TIME:12/30/2021 8:15 AM    CLINICAL HISTORY: Headache  G43.109 Migraine with aura and without status migrainosus, not intractable ICD10    COMPARISON: None    TECHNIQUE:  Multi-sequence multiplanar imaging of the brain was performed. Sequences included T1-weighted, T2-weighted, FLAIR, diffusion-weighted, ADC maps, and  susceptibility weighted imaging. VOLUME: None   MR CONTRAST: None    FINDINGS    Acute change: No restricted diffusion to suggest an acute infarct.     Magnetic susceptibility:There are no areas of abnormal magnetic susceptibility to suggest the presence of any acute blood products. Ventricles: Ventricles and sulci are age-appropriate. Brain parenchyma: Scattered bilateral periventricular and subcortical white matter T2/FLAIR hyperintensities. These are nonspecific and may be related to hypertension or microvascular ischemic changes. These white matter lesions are not disseminated in  time and do not facility 2017 revised Varela criteria for MS. However MS is best diagnosed clinically with supportive imaging and other tests. Mass: No mass or mass effect. No extra-axial fluid    Brainstem:Brainstem is unremarkable. Skull base: Cerebellar tonsils are normally positioned. No evidence of a marrow replacement process. Vasculature: The major intracranial arterial structures and dural venous sinuses showed typical flow void, suggesting patency by spin-echo criteria. Sinuses: The  mastoids and visualized paranasal sinuses are clear. IMPRESSION:  SCATTERED NONSPECIFIC WHITE MATTER HYPERINTENSITIES. NO ACUTE INTRACRANIAL PATHOLOGY. MRA of the Head and Neck: No results found for this or any previous visit. No results found for this or any previous visit. Results for orders placed during the hospital encounter of 12/30/21    MRA HEAD WO CONTRAST    Narrative  EXAMINATION: MRA HEAD WO CONTRAST,    DATE AND TIME:12/30/2021 8:15 AM    CLINICAL HISTORY: Acute stroke. G43.109 Migraine with aura and without status migrainosus, not intractable ICD10    COMPARISON: None    TECHNIQUE: 3-D time-of-flight MRA images of the intracranial circulation were obtained. FINDINGS:    Intracranial: Flow related enhancement is noted within the skull base, precavernous, cavernous, and supraclinoid segments of the internal carotid arteries bilaterally.     Anterior cerebral arteries: Flow related enhancement is noted within the bilateral anterior cerebral arteries,    Middle cerebral arteries:  Unremarkable flow related enhancement is noted in the middle cerebral artery branches bilaterally. Posterior cervical arteries: The bilateral posterior cerebral arteries are patent. Basilar arteries: Flow is visualized in the basilar artery. Aneurysm: No aneurysm or dissection is seen. Impression  Normal MRA of the head            EXAMINATION: MRI BRAIN WO CONTRAST    DATE AND TIME:12/30/2021 8:15 AM    CLINICAL HISTORY: Headache  G43.109 Migraine with aura and without status migrainosus, not intractable ICD10    COMPARISON: None    TECHNIQUE:  Multi-sequence multiplanar imaging of the brain was performed. Sequences included T1-weighted, T2-weighted, FLAIR, diffusion-weighted, ADC maps, and  susceptibility weighted imaging. VOLUME: None   MR CONTRAST: None    FINDINGS    Acute change: No restricted diffusion to suggest an acute infarct. Magnetic susceptibility:There are no areas of abnormal magnetic susceptibility to suggest the presence of any acute blood products. Ventricles: Ventricles and sulci are age-appropriate. Brain parenchyma: Scattered bilateral periventricular and subcortical white matter T2/FLAIR hyperintensities. These are nonspecific and may be related to hypertension or microvascular ischemic changes. These white matter lesions are not disseminated in  time and do not facility 2017 revised Varela criteria for MS. However MS is best diagnosed clinically with supportive imaging and other tests. Mass: No mass or mass effect. No extra-axial fluid    Brainstem:Brainstem is unremarkable. Skull base: Cerebellar tonsils are normally positioned. No evidence of a marrow replacement process. Vasculature: The major intracranial arterial structures and dural venous sinuses showed typical flow void, suggesting patency by spin-echo criteria. Sinuses: The  mastoids and visualized paranasal sinuses are clear. IMPRESSION:  SCATTERED NONSPECIFIC WHITE MATTER HYPERINTENSITIES. NO ACUTE INTRACRANIAL PATHOLOGY. CT of the Head: Results for orders placed during the hospital encounter of 07/20/21    CT HEAD WO CONTRAST    Narrative  EXAMINATION: CT HEAD WO CONTRAST, 7/20/2021 8:35 AM    CLINICAL HISTORY: R42 Dizziness and giddiness ICD10    COMPARISON: None    TECHNIQUE:  Multiple contiguous axial images of the head were obtained from the skull base through the skull vertex without intravenous contrast. Sagittal and coronal reformats have been produced. All CT scans at this facility use dose modulation, iterative reconstruction, and/or weight based dosing when appropriate to reduce radiation dose to as low as reasonably achievable. BRAIN CT FINDINGS:    Gray-white matter differentiation is maintained. No acute hemorrhage, mass, mass effect, or midline shift. There is no evidence of atrophy, ventricular morphology is within normal limits. The subcortical and periventricular white matter is within normal limits. The basal ganglia are within normal limits. There are no acute changes or space-occupying lesions in the posterior fossa. The visualized portions of the orbits are within normal limits. The globes are intact. The imaged portions of the paranasal sinuses are unremarkable. The calvarium is intact. Impression  There is no acute intracranial process. No results found for this or any previous visit. No results found for this or any previous visit. Carotid duplex: No results found for this or any previous visit. No results found for this or any previous visit. No results found for this or any previous visit. Echo No results found for this or any previous visit. Assessment/Plan:    Chart, labs,and relevant images reviewed. The encounter diagnosis was Transverse myelitis ([facility] [address]   21-year-old woman who presents with a suspicious history for demyelinating events [right afferent pupillary defect, history of an episode of diplopia, presentation in October 2021 with a cervical transverse

## 2022-02-22 ENCOUNTER — TELEPHONE (OUTPATIENT)
Dept: NEUROLOGY | Age: 42
End: 2022-02-22

## 2022-02-22 NOTE — TELEPHONE ENCOUNTER
Monique Norton called and is wanting to know specifically why patient needs another extension on her leave. They stated that they don't see any other treatment plan that would require her to be off longer.

## 2022-02-22 NOTE — TELEPHONE ENCOUNTER
Hello,    She is having worsening of symptoms (nausea), so in my medical opinion she needs to be off another week.      Thanks,      Mac Osgood

## 2022-02-28 ENCOUNTER — TELEPHONE (OUTPATIENT)
Dept: NEUROLOGY | Age: 42
End: 2022-02-28

## 2022-03-01 NOTE — TELEPHONE ENCOUNTER
Paperwork for her was done, but pt wants to know if there is another medication she can try inplace of the 40mg Cymbalta.

## 2022-03-03 RX ORDER — GABAPENTIN 300 MG/1
CAPSULE ORAL
Qty: 69 CAPSULE | Refills: 0 | Status: SHIPPED | OUTPATIENT
Start: 2022-03-03 | End: 2022-06-14

## 2022-04-12 DIAGNOSIS — E89.0 POSTOPERATIVE HYPOTHYROIDISM: ICD-10-CM

## 2022-04-12 DIAGNOSIS — G36.0 NEUROMYELITIS OPTICA SPECTRUM DISORDER (HCC): ICD-10-CM

## 2022-04-12 LAB
ANION GAP SERPL CALCULATED.3IONS-SCNC: 12 MEQ/L (ref 9–15)
BUN BLDV-MCNC: 9 MG/DL (ref 6–20)
CALCIUM SERPL-MCNC: 9.4 MG/DL (ref 8.5–9.9)
CHLORIDE BLD-SCNC: 102 MEQ/L (ref 95–107)
CO2: 24 MEQ/L (ref 20–31)
CREAT SERPL-MCNC: 0.87 MG/DL (ref 0.5–0.9)
GFR AFRICAN AMERICAN: >60
GFR NON-AFRICAN AMERICAN: >60
GLUCOSE BLD-MCNC: 71 MG/DL (ref 70–99)
POTASSIUM SERPL-SCNC: 4 MEQ/L (ref 3.4–4.9)
SODIUM BLD-SCNC: 138 MEQ/L (ref 135–144)
T4 FREE: 0.4 NG/DL (ref 0.84–1.68)

## 2022-04-13 RX ORDER — ONDANSETRON 4 MG/1
4 TABLET, ORALLY DISINTEGRATING ORAL 3 TIMES DAILY PRN
Qty: 90 TABLET | Refills: 0 | Status: SHIPPED | OUTPATIENT
Start: 2022-04-13 | End: 2022-06-14

## 2022-04-13 NOTE — TELEPHONE ENCOUNTER
Patient is requesting medication refill.  Please approve or deny this request.    Rx requested:  Requested Prescriptions     Pending Prescriptions Disp Refills    ondansetron (ZOFRAN-ODT) 4 MG disintegrating tablet 90 tablet 0     Sig: Take 1 tablet by mouth 3 times daily as needed for Nausea or Vomiting         Last Office Visit:   Visit date not found      Next Visit Date:  Future Appointments   Date Time Provider Ronnie Yu   6/14/2022  3:15 PM Arianna Gates MD [address] [place] [address]

## 2022-04-24 DIAGNOSIS — E89.0 POSTOPERATIVE HYPOTHYROIDISM: Primary | ICD-10-CM

## 2022-04-25 RX ORDER — LEVOTHYROXINE SODIUM 0.03 MG/1
TABLET ORAL
Qty: 30 TABLET | Refills: 5 | Status: SHIPPED | OUTPATIENT
Start: 2022-04-25

## 2022-04-25 NOTE — TELEPHONE ENCOUNTER
[facility] requesting medication refill. Please approve or deny this request.    Rx requested:  Requested Prescriptions     Pending Prescriptions Disp Refills    levothyroxine (SYNTHROID) 25 MCG tablet [Pharmacy Med Name: levothyroxine 25 mcg tablet] 30 tablet 6     Sig: TAKE 1 TABLET BY MOUTH DAILY.  total dose of 225 mcg DAILY         Last Office Visit:   12/10/2021      Next Visit Date:  Future Appointments   Date Time Provider Ronnie Yu   6/14/2022  3:15 PM Taya Light MD [address] [place] [address]

## 2022-06-14 ENCOUNTER — OFFICE VISIT (OUTPATIENT)
Dept: NEUROLOGY | Age: 42
End: 2022-06-14
Payer: COMMERCIAL

## 2022-06-14 VITALS
BODY MASS INDEX: 37.21 KG/M2 | SYSTOLIC BLOOD PRESSURE: 110 MMHG | HEIGHT: 63 IN | DIASTOLIC BLOOD PRESSURE: 80 MMHG | WEIGHT: 210 LBS | HEART RATE: 74 BPM

## 2022-06-14 DIAGNOSIS — R93.89 ABNORMAL MRI: ICD-10-CM

## 2022-06-14 DIAGNOSIS — R42 LIGHTHEADED: ICD-10-CM

## 2022-06-14 DIAGNOSIS — R42 DIZZINESS: ICD-10-CM

## 2022-06-14 DIAGNOSIS — G37.3 TRANSVERSE MYELITIS (HCC): ICD-10-CM

## 2022-06-14 DIAGNOSIS — R26.0 ATAXIC GAIT: ICD-10-CM

## 2022-06-14 DIAGNOSIS — G43.109 MIGRAINE WITH AURA AND WITHOUT STATUS MIGRAINOSUS, NOT INTRACTABLE: Primary | ICD-10-CM

## 2022-06-14 PROCEDURE — 1036F TOBACCO NON-USER: CPT | Performed by: PSYCHIATRY & NEUROLOGY

## 2022-06-14 PROCEDURE — G8417 CALC BMI ABV UP PARAM F/U: HCPCS | Performed by: PSYCHIATRY & NEUROLOGY

## 2022-06-14 PROCEDURE — G8427 DOCREV CUR MEDS BY ELIG CLIN: HCPCS | Performed by: PSYCHIATRY & NEUROLOGY

## 2022-06-14 PROCEDURE — 99215 OFFICE O/P EST HI 40 MIN: CPT | Performed by: PSYCHIATRY & NEUROLOGY

## 2022-06-14 RX ORDER — ONDANSETRON 8 MG/1
TABLET, ORALLY DISINTEGRATING ORAL
COMMUNITY
Start: 2022-04-13

## 2022-06-14 RX ORDER — METRONIDAZOLE 500 MG/1
TABLET ORAL
COMMUNITY
Start: 2022-06-09

## 2022-06-14 RX ORDER — HYDROXYZINE HYDROCHLORIDE 25 MG/1
25 TABLET, FILM COATED ORAL 3 TIMES DAILY
Qty: 90 TABLET | Refills: 1 | Status: SHIPPED | OUTPATIENT
Start: 2022-06-14 | End: 2022-07-14

## 2022-06-14 ASSESSMENT — ENCOUNTER SYMPTOMS
SHORTNESS OF BREATH: 0
COLOR CHANGE: 0
NAUSEA: 0
CHOKING: 0
TROUBLE SWALLOWING: 0
PHOTOPHOBIA: 0
VOMITING: 0
BACK PAIN: 0

## 2022-06-14 NOTE — PROGRESS NOTES
Subjective:      Patient ID: Jalil Perkins is a 37 y.o. female who presents today for:  Chief Complaint   Patient presents with    Follow-up     2 month f/u, transverse myelitis, patient states she has pain every now and then, more like numb patient states. HPI 52-year-old right-handed female with a history of vertigo and this. Patient was seen here initially for the same by my nurse practitioner. This is my first encounter with the patient and therefore extenuation evaluating all the records from previous providers. Patient initially seen here for headaches which she had 2-3 times a week rated at 8-10. Had extensive relation include MRI of the brain and internal auditory canals and all this appeared to be normal.  Patient had ataxic gait with vertigo as well. At that time started on [place] in January she was seen by another provider. Evaluation is noted and she had suggestion of previous optic neuropathy or neuritis with urinary urge incontinence and she had abnormal motor findings on her exam at that time. MRI showed nonspecific white matter changes and there appeared to be old juxtacortical.  Patient had an NMO antibody titers and further evaluation of cervical spine with gadolinium. 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 Of the spine was reviewed and is normal.    The MRI of the brain and shows very subtle tiny juxta cortical white matter lesions consistent with vasculopathy of migraine headaches. Past Medical History:   Diagnosis Date    Thyroid disease      Past Surgical History:   Procedure Laterality Date    DENTAL SURGERY      THYROIDECTOMY      TUBAL LIGATION       Social History     Socioeconomic History    Marital status:       Spouse name: Not on file    Number of children: Not on file    Years of education: Not on file    Highest education level: Not on file   Occupational History    Not on file   Tobacco Use    Smoking status: Never Smoker    Smokeless tobacco: Never Used   Substance and Sexual Activity    Alcohol use: Yes     Alcohol/week: 0.0 standard drinks     Comment: Occassionally    Drug use: No    Sexual activity: Yes   Other Topics Concern    Not on file   Social History Narrative    Not on file     Social Determinants of Health     Financial Resource Strain:     Difficulty of Paying Living Expenses: Not on file   Food Insecurity:     Worried About Running Out of Food in the Last Year: Not on file    Ct of Food in the Last Year: Not on file   Transportation Needs:     Lack of Transportation (Medical): Not on file    Lack of Transportation (Non-Medical): Not on file   Physical Activity:     Days of Exercise per Week: Not on file    Minutes of Exercise per Session: Not on file   Stress:     Feeling of Stress : Not on file   Social Connections:     Frequency of Communication with Friends and Family: Not on file    Frequency of Social Gatherings with Friends and Family: Not on file    Attends Scientologist Services: Not on file    Active Member of [address] [place] [address] or Organizations: Not on file    Attends Club or Organization Meetings: Not on file    Marital Status: Not on file   Intimate Partner Violence:     Fear of Current or Ex-Partner: Not on file    Emotionally Abused: Not on file    Physically Abused: Not on file    Sexually Abused: Not on file   Housing Stability:     Unable to Pay for Housing in the Last Year: Not on file    Number of [place] in the Last Year: Not on file    Unstable Housing in the Last Year: Not on file     No family history on file. No Known Allergies    Current Outpatient Medications   Medication Sig Dispense Refill    ondansetron (ZOFRAN-ODT) 8 MG TBDP disintegrating tablet DISSOLVE 1 (ONE) TABLET in mouth EVERY 8 HOURS AS NEEDED FOR NAUSEA AND VOMITING.  metroNIDAZOLE (FLAGYL) 500 MG tablet TAKE 1 TABLET BY MOUTH EVERY 12 HOURS FOR 7 DAYS.  for partner treatment      hydrOXYzine HCl (ATARAX) 25 MG tablet Take 1 tablet by mouth in the morning, at noon, and at bedtime 90 tablet 1    levothyroxine (SYNTHROID) 25 MCG tablet TAKE 1 TABLET BY MOUTH DAILY. total dose of 225 mcg DAILY 30 tablet 5    gabapentin (NEURONTIN) 300 MG capsule Take 1 capsule by mouth daily for 7 days, THEN 1 capsule 2 times daily for 7 days, THEN 1 capsule 3 times daily for 16 days. 69 capsule 0    meclizine (ANTIVERT) 25 MG tablet TAKE 1 TABLET BY MOUTH THREE TIMES DAILY AS NEEDED 90 tablet 2    vitamin D (ERGOCALCIFEROL) 1.25 MG (96037 UT) CAPS capsule TAKE 1 CAPSULE BY MOUTH ONCE A WEEK FOR VITAMIN D DEFICIENCY for 6 months.  traZODone (DESYREL) 50 MG tablet take a 1/2 tablet by mouth about 2 hours before bed to help with sleep; if this dose not help can increase 1 tablet before bed      pantoprazole (PROTONIX) 20 MG tablet TAKE 1 TABLET BY MOUTH TWICE DAILY. TAKE 1 TABLET IN THE MORNING and TAKE 1 TABLET at NIGHT      levothyroxine (SYNTHROID) 200 MCG tablet Once a day in addition to 25 mcg total dose 225 mcg 30 tablet 3    metFORMIN (GLUCOPHAGE) 500 MG tablet Take 1 tablet by mouth 2 times daily (with meals) 180 tablet 1    D3 SUPER STRENGTH 2000 units CAPS TAKE 1 CAPSULE DAILY  2    DULoxetine 40 MG CPEP Take 40 mg by mouth daily (Patient not taking: Reported on 6/14/2022) 90 capsule 2    LORazepam (ATIVAN) 1 MG tablet Take 1 tablet by mouth once for 1 dose.  Take 30 mins prior to MRI (Patient not taking: Reported on 2/15/2022)      ARIPiprazole (ABILIFY) 5 MG tablet TAKE 1 TABLET BY MOUTH EVERY DAY (Patient not taking: Reported on 6/14/2022)      famotidine (PEPCID) 40 MG tablet Take 40 mg by mouth daily (Patient not taking: Reported on 6/14/2022)      ibuprofen (ADVIL;MOTRIN) 400 MG tablet TAKE 1 TABLET BY MOUTH THREE TIMES DAILY FOR 2 WEEKS (Patient not taking: Reported on 6/14/2022)      butalbital-acetaminophen-caffeine (FIORICET, ESGIC) -40 MG per tablet Take 1 tablet by is alert and oriented to person, place, and time. Cranial Nerves: No cranial nerve deficit. Sensory: No sensory deficit. Motor: No abnormal muscle tone. Coordination: Coordination normal.      Deep Tendon Reflexes: Reflexes are normal and symmetric. Babinski sign absent on the right side. Babinski sign absent on the left side. Psychiatric:         Mood and Affect: Mood normal.         MRI CERVICAL SPINE W WO CONTRAST    Result Date: 2/3/2022  MRI of the cervical spine without and with contrast. History: Neuromyelitis optica spectrum disorder. Transverse myelitis. Technique: Multiplanar multisequence MRI of the cervical spine was performed without and with contrast. Comparison: None available Findings: No cervical cord signal abnormality or pathologic enhancement. No aggressive bone marrow signal abnormality. Cervical spine vertebral body heights are preserved. Cervical spine alignment is preserved. Intervertebral disc heights are preserved. C2-C3: No significant disc bulge, spinal canal or neuroforaminal stenosis. C3-C4: No significant disc bulge, spinal canal or neuroforaminal stenosis. C4-C5: No significant disc bulge, spinal canal or neuroforaminal stenosis. C5-C6: No significant disc bulge, spinal canal or neuroforaminal stenosis. C6-C7: No significant disc bulge, spinal canal or neuroforaminal stenosis. C7-T1: No significant disc bulge, spinal canal or neuroforaminal stenosis. Visualized paravertebral soft tissues are grossly unremarkable. Normal signal and morphology of the cervical spinal cord. No enhancing cord lesion. Symptoms appear to be mostly that of dizziness and may be vestibular in nature. She has some minor tingling and numbness which is nonspecific and has had some response to gabapentin.       Lab Results   Component Value Date    WBC 6.5 10/10/2017    RBC 4.75 10/10/2017    HGB 12.6 10/10/2017    HCT 39.7 10/10/2017    MCV 83.5 10/10/2017    MCH 26.4 10/10/2017 MCHC 31.7 10/10/2017    RDW 15.1 10/10/2017     10/10/2017     Lab Results   Component Value Date     04/12/2022    K 4.0 04/12/2022     04/12/2022    CO2 24 04/12/2022    BUN 9 04/12/2022    CREATININE 0.87 04/12/2022    GFRAA >60.0 04/12/2022    LABGLOM >60.0 04/12/2022    GLUCOSE 71 04/12/2022    PROT 6.7 10/10/2017    LABALBU 3.9 10/10/2017    CALCIUM 9.4 04/12/2022    BILITOT 0.1 10/10/2017    ALKPHOS 58 10/10/2017    AST 16 10/10/2017    ALT 15 10/10/2017     No results found for: PROTIME, INR  Lab Results   Component Value Date    TSH 6.700 01/07/2022    [id number] 01/07/2022     No results found for: TRIG, HDL, LDLCALC, LDLDIRECT, LABVLDL  Lab Results   Component Value Date    LABBENZ NotDTCD 03/14/2013     No results found for: LITHIUM, DILFRTOT, VALPROATE    Assessment:       Diagnosis Orders   1. Migraine with aura and without status migrainosus, not intractable     2. Ataxic gait     3. Transverse myelitis ([address]     4. Dizziness     5. Lightheaded     6. Abnormal MRI     Migraine headaches of basilar migraines with an abnormal MRI. Patient extensive work-up for the same and has been seen by multiple providers. This is my first encounter and therefore chart review done prior to patient's arrival and scans were reviewed as well. From the MRI findings patient has very subtle suggestion of vasculopathy of migraine headaches and this is not demyelination is seen in multiple sclerosis. Patient had an extensive work-up for demyelination including NMO antibodies which are negative. Her cervical spine MRI does not show anything significant. At this time these findings are very nonspecific and therefore started on gabapentin for numbness tingling as well as hydroxyzine for dizziness. We have reassured her that this is not Multiple sclerosis and some of her symptoms are nonspecific and may not require treatments for now and will follow clinically to see if anything develops.   Extensive chart review done prior to patient's coming to the office as this is my first encounter with additional time of 40 minutes. Plan:      No orders of the defined types were placed in this encounter. Orders Placed This Encounter   Medications    hydrOXYzine HCl (ATARAX) 25 MG tablet     Sig: Take 1 tablet by mouth in the morning, at noon, and at bedtime     Dispense:  90 tablet     Refill:  1       Return in about 4 months (around 10/14/2022).       Geoff Vogt MD

## 2022-06-17 ENCOUNTER — HOSPITAL ENCOUNTER (OUTPATIENT)
Dept: WOMENS IMAGING | Age: 42
Discharge: HOME | End: 2022-06-19

## 2022-06-17 DIAGNOSIS — Z12.31 SCREENING MAMMOGRAM FOR HIGH-RISK PATIENT: ICD-10-CM

## 2022-07-19 ENCOUNTER — HOSPITAL ENCOUNTER (OUTPATIENT)
Dept: ULTRASOUND IMAGING | Age: 42
Discharge: HOME | End: 2022-07-21
Payer: COMMERCIAL

## 2022-07-19 DIAGNOSIS — D25.9 UTERINE LEIOMYOMA, UNSPECIFIED LOCATION: ICD-10-CM

## 2022-07-19 PROCEDURE — 76856 US EXAM PELVIC COMPLETE: CPT

## 2022-07-19 PROCEDURE — 76830 TRANSVAGINAL US NON-OB: CPT

## 2022-07-28 ENCOUNTER — HOSPITAL ENCOUNTER (OUTPATIENT)
Dept: WOMENS IMAGING | Age: 42
Discharge: HOME | End: 2022-07-30
Payer: COMMERCIAL

## 2022-07-28 DIAGNOSIS — Z12.31 ENCOUNTER FOR SCREENING MAMMOGRAM FOR BREAST CANCER: ICD-10-CM

## 2022-07-28 PROCEDURE — 77063 BREAST TOMOSYNTHESIS BI: CPT

## 2022-10-08 RX ORDER — LEVOTHYROXINE SODIUM 0.2 MG/1
TABLET ORAL
Qty: 30 TABLET | Refills: 3 | Status: SHIPPED | OUTPATIENT
Start: 2022-10-08

## 2022-12-01 ENCOUNTER — OFFICE VISIT (OUTPATIENT)
Dept: GASTROENTEROLOGY | Age: 42
End: 2022-12-01
Payer: COMMERCIAL

## 2022-12-01 VITALS
WEIGHT: 200 LBS | OXYGEN SATURATION: 98 % | BODY MASS INDEX: 35.43 KG/M2 | HEART RATE: 80 BPM | SYSTOLIC BLOOD PRESSURE: 116 MMHG | DIASTOLIC BLOOD PRESSURE: 70 MMHG

## 2022-12-01 DIAGNOSIS — K21.9 GASTROESOPHAGEAL REFLUX DISEASE WITHOUT ESOPHAGITIS: Primary | ICD-10-CM

## 2022-12-01 DIAGNOSIS — K62.5 BLOOD PER RECTUM: ICD-10-CM

## 2022-12-01 PROCEDURE — G8427 DOCREV CUR MEDS BY ELIG CLIN: HCPCS | Performed by: INTERNAL MEDICINE

## 2022-12-01 PROCEDURE — G8484 FLU IMMUNIZE NO ADMIN: HCPCS | Performed by: INTERNAL MEDICINE

## 2022-12-01 PROCEDURE — 1036F TOBACCO NON-USER: CPT | Performed by: INTERNAL MEDICINE

## 2022-12-01 PROCEDURE — 99204 OFFICE O/P NEW MOD 45 MIN: CPT | Performed by: INTERNAL MEDICINE

## 2022-12-01 PROCEDURE — G8417 CALC BMI ABV UP PARAM F/U: HCPCS | Performed by: INTERNAL MEDICINE

## 2022-12-01 RX ORDER — PANTOPRAZOLE SODIUM 40 MG/1
40 TABLET, DELAYED RELEASE ORAL
Qty: 30 TABLET | Refills: 5 | Status: SHIPPED | OUTPATIENT
Start: 2022-12-01

## 2022-12-01 ASSESSMENT — ENCOUNTER SYMPTOMS
WHEEZING: 0
ABDOMINAL DISTENTION: 0
VOMITING: 0
RECTAL PAIN: 0
TROUBLE SWALLOWING: 0
COLOR CHANGE: 0
EYE PAIN: 0
HEMATOCHEZIA: 1
ABDOMINAL PAIN: 0
CONSTIPATION: 1
BLOOD IN STOOL: 1
PHOTOPHOBIA: 0
VOICE CHANGE: 0
SHORTNESS OF BREATH: 0
CHEST TIGHTNESS: 0
EYE REDNESS: 0
DIARRHEA: 0
NAUSEA: 1

## 2022-12-01 NOTE — PROGRESS NOTES
Subjective:      Patient ID: Diego Mancilla is a 40 y.o. female who presents today for:  Chief Complaint   Patient presents with    Gastroesophageal Reflux    Rectal Bleeding       Gastroesophageal Reflux  She complains of nausea. She reports no abdominal pain, no chest pain or no wheezing. Pertinent negatives include no fatigue. Rectal Bleeding   Associated symptoms include nausea. Pertinent negatives include no fever, no abdominal pain, no diarrhea, no rectal pain, no vomiting, no hematuria, no chest pain, no headaches and no rash. This is a very pleasant 81-year-old who came in today for the evaluation management. Patient mentioned that she is was following [email or web address] for acid heartburn. Also report postprandial epigastric pain and fullness. Patient mentioned that she has multiple EGDs that were negative. She did have also gastric emptying study that reported negative. She reported that she was treated for H. pylori and had additional testing that ensured eradication. She is currently on Protonix twice daily. Over the last few weeks she had worsening heartburn and acid. Also she does endorse persistent nausea after small meals. She does report difficulty having bowel movement with significant constipation. Intermittent blood per rectum noted. No recent colonoscopy. Given the intermittent blood per rectum in the context of constipation and worsening heartburn patient referred to us for further evaluation management  Past Medical History:   Diagnosis Date    Thyroid disease      Past Surgical History:   Procedure Laterality Date    DENTAL SURGERY      THYROIDECTOMY      TUBAL LIGATION      US BREAST FINE NEEDLE ASPIRATION Right      Social History     Socioeconomic History    Marital status:       Spouse name: Not on file    Number of children: Not on file    Years of education: Not on file    Highest education level: Not on file   Occupational History    Not on file   Tobacco Use Smoking status: Never    Smokeless tobacco: Never   Substance and Sexual Activity    Alcohol use: Yes     Alcohol/week: 0.0 standard drinks     Comment: Occassionally    Drug use: No    Sexual activity: Yes   Other Topics Concern    Not on file   Social History Narrative    Not on file     Social Determinants of Health     Financial Resource Strain: Not on file   Food Insecurity: Not on file   Transportation Needs: Not on file   Physical Activity: Not on file   Stress: Not on file   Social Connections: Not on file   Intimate Partner Violence: Not on file   Housing Stability: Not on file     No family history on file. No Known Allergies      Review of Systems   Constitutional:  Negative for appetite change, chills, fatigue, fever and unexpected weight change. HENT:  Negative for nosebleeds, tinnitus, trouble swallowing and voice change. Eyes:  Negative for photophobia, pain and redness. Respiratory:  Negative for chest tightness, shortness of breath and wheezing. Cardiovascular:  Negative for chest pain, palpitations and leg swelling. Gastrointestinal:  Positive for blood in stool, constipation, hematochezia and nausea. Negative for abdominal distention, abdominal pain, diarrhea, rectal pain and vomiting. Endocrine: Negative for polydipsia, polyphagia and polyuria. Genitourinary:  Negative for difficulty urinating and hematuria. Skin:  Negative for color change, pallor and rash. Neurological:  Negative for dizziness, speech difficulty and headaches. Psychiatric/Behavioral:  Negative for confusion and suicidal ideas. Objective:   /70 (Site: Right Upper Arm, Position: Sitting, Cuff Size: Small Adult)   Pulse 80   Wt 200 lb (90.7 kg)   SpO2 98%   BMI 35.43 kg/m²     Physical Exam  Constitutional:       General: She is not in acute distress. Appearance: She is well-developed. HENT:      Head: Normocephalic and atraumatic.    Eyes:      Conjunctiva/sclera: Conjunctivae normal. Pupils: Pupils are equal, round, and reactive to light. Cardiovascular:      Rate and Rhythm: Normal rate and regular rhythm. Heart sounds: Normal heart sounds. Pulmonary:      Effort: Pulmonary effort is normal. No respiratory distress. Breath sounds: Normal breath sounds. No wheezing or rales. Abdominal:      General: Bowel sounds are normal. There is no distension. Palpations: Abdomen is soft. Abdomen is not rigid. There is no hepatomegaly, splenomegaly or mass. Tenderness: There is no abdominal tenderness. There is no guarding or rebound. Musculoskeletal:         General: No tenderness or deformity. Normal range of motion. Cervical back: Neck supple. Skin:     Coloration: Skin is not pale. Findings: No erythema or rash. Neurological:      Mental Status: She is alert and oriented to person, place, and time. Laboratory, Pathology, Radiology reviewed in detail with relevantimportant investigations summarized below:  Lab Results   Component Value Date/Time    WBC 6.5 10/10/2017 10:09 AM    WBC 4.6 04/11/2016 09:20 AM    HGB 12.6 10/10/2017 10:09 AM    HGB 12.8 04/11/2016 09:20 AM    HCT 39.7 10/10/2017 10:09 AM    HCT 39.2 04/11/2016 09:20 AM    MCV 83.5 10/10/2017 10:09 AM    MCV 84.0 04/11/2016 09:20 AM     10/10/2017 10:09 AM     04/11/2016 09:20 AM    .  Lab Results   Component Value Date/Time    ALT 15 10/10/2017 10:09 AM    ALT 17 04/11/2016 09:20 AM    AST 16 10/10/2017 10:09 AM    AST 16 04/11/2016 09:20 AM    ALKPHOS 58 10/10/2017 10:09 AM    ALKPHOS 60 04/11/2016 09:20 AM    BILITOT 0.1 10/10/2017 10:09 AM    BILITOT 0.3 04/11/2016 09:20 AM       No results found.   No results found for: IRON, TIBC, FERRITIN  No results found for: INR  No components found for: ACUTEHEPATITISSCREEN  No components found for: CELIACPANEL  No components found for: STOOLCULTURE, C.DIFF, STOOLOVAPARASITE, STOOLLEUCOCYTE        Assessment:      Assessment and plan: 1.  Constipation, blood per rectum:  Initiate bowel regimen with high-fiber diet plenty of water, MiraLAX daily titrate response, Colace as needed  Proceed with colonoscopy. Explained the procedure risk and benefit. Patient would like to proceed accordingly  Colonoscopy with an extended colon prep  2- Heartburn / GERD  Continue Protonix 4 mg before breakfast  Lifestyle modification recommended and discussed with the patient   --Avoid spicy and acidic based foods   --Limit coffee, tea, alcohol use   --Limit the amount of food during meal time   --Avoid bedtime snacks and eat meals 3 to 4 hrs before lying down   --Elevate the head of the bed    --Keep healthy weight  EGD requested to assess for heartburn/GERD related complication. Assess the presence of hiatal hernia. The upper endoscopy procedure, complications, risk and benefit were reviewed. Patient would like to proceed accordingly. EGD with gastric and duodenal biopsies  3-Postprandial nausea, early satiety:  Patient mentioned that she had extensive work-up at [facility] with previous multiple EGDs. Had gastric emptying study was reported negative. Also mentioned that she has ultrasound and HIDA scan that were negative  Will evaluate for a repeat testing upon obtaining EGD and colonoscopy  4-Associated medical conditions: Postoperative hypothyroidism, GERD, spondylosis, polycystic ovarian syndrome,? Acute transverse myelitis follows with neurology          Return in about 6 weeks (around 1/12/2023) for Post procedure results discussion, further management.       Grady Nguyen MD

## 2022-12-13 RX ORDER — POLYETHYLENE GLYCOL 3350 17 G/17G
POWDER, FOR SOLUTION ORAL
Qty: 119 G | Refills: 0
Start: 2022-12-13

## 2022-12-30 ENCOUNTER — ANESTHESIA (OUTPATIENT)
Dept: ENDOSCOPY | Age: 42
End: 2022-12-30
Payer: COMMERCIAL

## 2022-12-30 ENCOUNTER — ANESTHESIA EVENT (OUTPATIENT)
Dept: ENDOSCOPY | Age: 42
End: 2022-12-30
Payer: COMMERCIAL

## 2022-12-30 ENCOUNTER — HOSPITAL ENCOUNTER (OUTPATIENT)
Age: 42
Setting detail: OUTPATIENT SURGERY
Discharge: HOME | End: 2022-12-30
Attending: INTERNAL MEDICINE | Admitting: INTERNAL MEDICINE
Payer: COMMERCIAL

## 2022-12-30 VITALS
HEIGHT: 63 IN | TEMPERATURE: 98.5 F | RESPIRATION RATE: 18 BRPM | DIASTOLIC BLOOD PRESSURE: 76 MMHG | BODY MASS INDEX: 34.38 KG/M2 | SYSTOLIC BLOOD PRESSURE: 117 MMHG | OXYGEN SATURATION: 100 % | HEART RATE: 77 BPM | WEIGHT: 194 LBS

## 2022-12-30 DIAGNOSIS — K21.9 GASTROESOPHAGEAL REFLUX DISEASE WITHOUT ESOPHAGITIS: ICD-10-CM

## 2022-12-30 DIAGNOSIS — K62.5 BLOOD PER RECTUM: ICD-10-CM

## 2022-12-30 PROBLEM — K63.5 POLYP OF COLON: Status: ACTIVE | Noted: 2022-12-30

## 2022-12-30 PROBLEM — K29.70 GASTRITIS WITHOUT BLEEDING: Status: ACTIVE | Noted: 2022-12-30

## 2022-12-30 LAB
HCG, URINE, POC: NEGATIVE
Lab: NORMAL
NEGATIVE QC PASS/FAIL: NORMAL
POSITIVE QC PASS/FAIL: NORMAL

## 2022-12-30 PROCEDURE — 7100000010 HC PHASE II RECOVERY - FIRST 15 MIN: Performed by: INTERNAL MEDICINE

## 2022-12-30 PROCEDURE — 6360000002 HC RX W HCPCS: Performed by: NURSE ANESTHETIST, CERTIFIED REGISTERED

## 2022-12-30 PROCEDURE — 3700000001 HC ADD 15 MINUTES (ANESTHESIA): Performed by: INTERNAL MEDICINE

## 2022-12-30 PROCEDURE — 3700000000 HC ANESTHESIA ATTENDED CARE: Performed by: INTERNAL MEDICINE

## 2022-12-30 PROCEDURE — 43239 EGD BIOPSY SINGLE/MULTIPLE: CPT | Performed by: INTERNAL MEDICINE

## 2022-12-30 PROCEDURE — 2709999900 HC NON-CHARGEABLE SUPPLY: Performed by: INTERNAL MEDICINE

## 2022-12-30 PROCEDURE — 7100000011 HC PHASE II RECOVERY - ADDTL 15 MIN: Performed by: INTERNAL MEDICINE

## 2022-12-30 PROCEDURE — 45380 COLONOSCOPY AND BIOPSY: CPT | Performed by: INTERNAL MEDICINE

## 2022-12-30 PROCEDURE — 88342 IMHCHEM/IMCYTCHM 1ST ANTB: CPT

## 2022-12-30 PROCEDURE — 6370000000 HC RX 637 (ALT 250 FOR IP): Performed by: INTERNAL MEDICINE

## 2022-12-30 PROCEDURE — 3609027000 HC COLONOSCOPY: Performed by: INTERNAL MEDICINE

## 2022-12-30 PROCEDURE — 3609017100 HC EGD: Performed by: INTERNAL MEDICINE

## 2022-12-30 PROCEDURE — 2580000003 HC RX 258: Performed by: INTERNAL MEDICINE

## 2022-12-30 PROCEDURE — 88305 TISSUE EXAM BY PATHOLOGIST: CPT

## 2022-12-30 PROCEDURE — 2500000003 HC RX 250 WO HCPCS: Performed by: NURSE ANESTHETIST, CERTIFIED REGISTERED

## 2022-12-30 PROCEDURE — 2580000003 HC RX 258

## 2022-12-30 RX ORDER — LIDOCAINE HYDROCHLORIDE 20 MG/ML
INJECTION, SOLUTION INFILTRATION; PERINEURAL PRN
Status: DISCONTINUED | OUTPATIENT
Start: 2022-12-30 | End: 2022-12-30 | Stop reason: SDUPTHER

## 2022-12-30 RX ORDER — MAGNESIUM HYDROXIDE 1200 MG/15ML
LIQUID ORAL PRN
Status: DISCONTINUED | OUTPATIENT
Start: 2022-12-30 | End: 2022-12-30 | Stop reason: ALTCHOICE

## 2022-12-30 RX ORDER — PANTOPRAZOLE SODIUM 40 MG/1
40 TABLET, DELAYED RELEASE ORAL
Qty: 180 TABLET | Refills: 1 | Status: SHIPPED | OUTPATIENT
Start: 2022-12-30

## 2022-12-30 RX ORDER — ONDANSETRON 2 MG/ML
INJECTION INTRAMUSCULAR; INTRAVENOUS PRN
Status: DISCONTINUED | OUTPATIENT
Start: 2022-12-30 | End: 2022-12-30 | Stop reason: SDUPTHER

## 2022-12-30 RX ORDER — SODIUM CHLORIDE 9 MG/ML
INJECTION, SOLUTION INTRAVENOUS CONTINUOUS
Status: DISCONTINUED | OUTPATIENT
Start: 2022-12-30 | End: 2022-12-30 | Stop reason: HOSPADM

## 2022-12-30 RX ORDER — SODIUM CHLORIDE 9 MG/ML
INJECTION, SOLUTION INTRAVENOUS
Status: COMPLETED
Start: 2022-12-30 | End: 2022-12-30

## 2022-12-30 RX ORDER — SIMETHICONE 20 MG/.3ML
EMULSION ORAL PRN
Status: DISCONTINUED | OUTPATIENT
Start: 2022-12-30 | End: 2022-12-30 | Stop reason: ALTCHOICE

## 2022-12-30 RX ORDER — PROPOFOL 10 MG/ML
INJECTION, EMULSION INTRAVENOUS PRN
Status: DISCONTINUED | OUTPATIENT
Start: 2022-12-30 | End: 2022-12-30 | Stop reason: SDUPTHER

## 2022-12-30 RX ADMIN — SODIUM CHLORIDE: 9 INJECTION, SOLUTION INTRAVENOUS at 10:05

## 2022-12-30 RX ADMIN — LIDOCAINE HYDROCHLORIDE 80 MG: 20 INJECTION, SOLUTION INFILTRATION; PERINEURAL at 10:47

## 2022-12-30 RX ADMIN — PROPOFOL 150 MCG/KG/MIN: 10 INJECTION, EMULSION INTRAVENOUS at 10:51

## 2022-12-30 RX ADMIN — ONDANSETRON 4 MG: 2 INJECTION INTRAMUSCULAR; INTRAVENOUS at 11:10

## 2022-12-30 RX ADMIN — PROPOFOL 100 MG: 10 INJECTION, EMULSION INTRAVENOUS at 10:47

## 2022-12-30 ASSESSMENT — PAIN SCALES - GENERAL
PAINLEVEL_OUTOF10: 0

## 2022-12-30 ASSESSMENT — PAIN - FUNCTIONAL ASSESSMENT: PAIN_FUNCTIONAL_ASSESSMENT: 0-10

## 2022-12-30 NOTE — ANESTHESIA PRE PROCEDURE
Department of Anesthesiology  Preprocedure Note       Name:  Efraín Michaels   Age:  40 y.o.  :  1980                                          MRN:  [id number]         Date:  2022      Surgeon: Emily Burr):  Moishe Seip, MD    Procedure: Procedure(s):  COLONOSCOPY DIAGNOSTIC  EGD ESOPHAGOGASTRODUODENOSCOPY    Medications prior to admission:   Prior to Admission medications    Medication Sig Start Date End Date Taking? Authorizing Provider   polyethylene glycol (MIRALAX) 17 g packet Samples given  Patient not taking: Reported on 2022   Moishe Seip, MD   busPIRone HCl (BUSPAR PO) Take by mouth    Historical Provider, MD   pantoprazole (PROTONIX) 40 MG tablet Take 1 tablet by mouth every morning (before breakfast) 22   Moishe Seip, MD   levothyroxine (SYNTHROID) 200 MCG tablet TAKE 1 TABLET BY MOUTH DAILY. total dose 225 mcg DAILY 10/8/22   Richard Conklin MD   ondansetron (ZOFRAN-ODT) 8 MG TBDP disintegrating tablet DISSOLVE 1 (ONE) TABLET in mouth EVERY 8 HOURS AS NEEDED FOR NAUSEA AND VOMITING. 22   Historical Provider, MD   metroNIDAZOLE (FLAGYL) 500 MG tablet TAKE 1 TABLET BY MOUTH EVERY 12 HOURS FOR 7 DAYS. for partner treatment  Patient not taking: Reported on 2022   Historical Provider, MD   levothyroxine (SYNTHROID) 25 MCG tablet TAKE 1 TABLET BY MOUTH DAILY. total dose of 225 mcg DAILY 22   Ancelmo York MD   gabapentin (NEURONTIN) 300 MG capsule Take 1 capsule by mouth daily for 7 days, THEN 1 capsule 2 times daily for 7 days, THEN 1 capsule 3 times daily for 16 days. 3/3/22 12/30/22  Brant Garnett MD   meclizine (ANTIVERT) 25 MG tablet TAKE 1 TABLET BY MOUTH THREE TIMES DAILY AS NEEDED 2/15/22   Pino Saba MD   DULoxetine 40 MG CPEP Take 40 mg by mouth daily  Patient not taking: No sig reported 2/15/22   Pino Saba MD   LORazepam (ATIVAN) 1 MG tablet Take 1 tablet by mouth once for 1 dose.  Take 30 mins prior to MRI  Patient not taking: No sig reported 10/28/21   Historical Provider, MD   ARIPiprazole (ABILIFY) 5 MG tablet TAKE 1 TABLET BY MOUTH EVERY DAY  Patient not taking: No sig reported 9/17/21   Historical Provider, MD   vitamin D (ERGOCALCIFEROL) 1.25 MG (19268 UT) CAPS capsule TAKE 1 CAPSULE BY MOUTH ONCE A WEEK FOR VITAMIN D DEFICIENCY for 6 months. 7/23/21   Historical Provider, MD   ibuprofen (ADVIL;MOTRIN) 400 MG tablet TAKE 1 TABLET BY MOUTH THREE TIMES DAILY FOR 2 WEEKS  Patient not taking: No sig reported 9/1/21   Historical Provider, MD   traZODone (DESYREL) 50 MG tablet take a 1/2 tablet by mouth about 2 hours before bed to help with sleep; if this dose not help can increase 1 tablet before bed  Patient not taking: Reported on 12/30/2022 8/13/21   Historical Provider, MD   butalbital-acetaminophen-caffeine (FIORICET, ESGIC) -40 MG per tablet Take 1 tablet by mouth every 6 hours as needed for Headaches  Patient not taking: No sig reported 10/28/21   OLIVIA Alvarado - CNP   Multiple Vitamin (MULTIVITAMIN ADULT PO) Take by mouth  Patient not taking: No sig reported    Historical Provider, MD   metFORMIN (GLUCOPHAGE) 500 MG tablet Take 1 tablet by mouth 2 times daily (with meals)  Patient not taking: Reported on 12/30/2022 8/18/21   Fran Briseno MD   spironolactone (ALDACTONE) 50 MG tablet Take 1 tablet by mouth 2 times daily  Patient not taking: No sig reported 8/18/21   Fran Briseno MD   D3 SUPER STRENGTH 2000 units CAPS TAKE 1 CAPSULE DAILY  Patient not taking: Reported on 12/30/2022 8/13/17   Historical Provider, MD       Current medications:    Current Facility-Administered Medications   Medication Dose Route Frequency Provider Last Rate Last Admin    0.9 % sodium chloride infusion   IntraVENous Continuous Lonnie Salazar  mL/hr at 12/30/22 1005 New Bag at 12/30/22 1005       Allergies:  No Known Allergies    Problem List:    Patient Active Problem List   Diagnosis Code    Hypothyroidism E03.9    Gastroesophageal reflux disease K21.9    Morbid obesity due to excess calories (HCC) E66.01    Cutaneous abscess of face L02.01    Acanthosis nigricans L83    Congenital spondylolysis, lumbosacral region Q76.2    Contact w and exposure to oth viral communicable diseases Z20.828    Contact with and (suspected) exposure to MYFPG-92 K12.948    Folliculitis S60.7    Low vitamin D level R79.89    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 109    Ataxic gait R26.0    Lightheaded R42    Sleep apnea G47.30    Abnormal electrocardiography R94.31    Chest pain R07.9    Fatigue R53.83    Normal left ventricular systolic function and wall motion [id number]    Overweight E66.3    Palpitations R00.2    Premature atrial contraction I49.1    Snoring R06.83    Transverse myelitis (HCC) G37.3    Abnormal MRI R93.89       Past Medical History:        Diagnosis Date    Thyroid disease        Past Surgical History:        Procedure Laterality Date    DENTAL SURGERY      THYROIDECTOMY      TUBAL LIGATION      US BREAST FINE NEEDLE ASPIRATION Right        Social History:    Social History     Tobacco Use    Smoking status: Never    Smokeless tobacco: Never   Substance Use Topics    Alcohol use:  Yes     Alcohol/week: 0.0 standard drinks     Comment: Occassionally                                Counseling given: Not Answered      Vital Signs (Current):   Vitals:    12/30/22 0952   BP: (!) 145/93   Pulse: 83   Resp: 18   Temp: 36.9 °C (98.5 °F)   TempSrc: Temporal   SpO2: 100%   Weight: 194 lb (88 kg)   Height: 5' 3\" (1.6 m)                                              BP Readings from Last 3 Encounters:   12/30/22 (!) 145/93   12/01/22 116/70   06/14/22 110/80       NPO Status: Time of last liquid consumption: 2230                        Time of last solid consumption: 1300                        Date of last liquid consumption: 12/29/22                        Date of last solid food consumption: 12/28/22    BMI:   Wt Readings from Last 3 Encounters:   12/30/22 194 lb (88 kg)   12/01/22 200 lb (90.7 kg)   06/14/22 210 lb (95.3 kg)     Body mass index is 34.37 kg/m². CBC:   Lab Results   Component Value Date/Time    WBC 6.5 10/10/2017 10:09 AM    RBC 4.75 10/10/2017 10:09 AM    HGB 12.6 10/10/2017 10:09 AM    HCT 39.7 10/10/2017 10:09 AM    MCV 83.5 10/10/2017 10:09 AM    RDW 15.1 10/10/2017 10:09 AM     10/10/2017 10:09 AM       CMP:   Lab Results   Component Value Date/Time     04/12/2022 12:37 PM    K 4.0 04/12/2022 12:37 PM     04/12/2022 12:37 PM    CO2 24 04/12/2022 12:37 PM    BUN 9 04/12/2022 12:37 PM    CREATININE 0.87 04/12/2022 12:37 PM    GFRAA >60.0 04/12/2022 12:37 PM    LABGLOM >60.0 04/12/2022 12:37 PM    GLUCOSE 71 04/12/2022 12:37 PM    PROT 6.7 10/10/2017 10:09 AM    CALCIUM 9.4 04/12/2022 12:37 PM    BILITOT 0.1 10/10/2017 10:09 AM    ALKPHOS 58 10/10/2017 10:09 AM    AST 16 10/10/2017 10:09 AM    ALT 15 10/10/2017 10:09 AM       POC Tests: No results for input(s): POCGLU, POCNA, POCK, POCCL, POCBUN, POCHEMO, POCHCT in the last 72 hours.     Coags: No results found for: PROTIME, INR, APTT    HCG (If Applicable): No results found for: PREGTESTUR, PREGSERUM, HCG, HCGQUANT     ABGs: No results found for: PHART, PO2ART, WVG0DOJ, VZF3MSD, BEART, E9HJNTYE     Type & Screen (If Applicable):  No results found for: LABABO, LABRH    Drug/Infectious Status (If Applicable):  Lab Results   Component Value Date/Time    HEPCAB NONREACTIVE 01/07/2022 01:13 PM       COVID-19 Screening (If Applicable):   Lab Results   Component Value Date/Time    COVID19 Not Detected 09/01/2021 03:23 PM           Anesthesia Evaluation  Patient summary reviewed and Nursing notes reviewed  Airway: Mallampati: II  TM distance: >3 FB   Neck ROM: full  Mouth opening: > = 3 FB   Dental: normal exam         Pulmonary:                              Cardiovascular:                      Neuro/Psych:               GI/Hepatic/Renal:             Endo/Other:                     Abdominal:             Vascular: Other Findings:           Anesthesia Plan      MAC     ASA 3             Anesthetic plan and risks discussed with patient. Use of blood products discussed with patient whom consented to blood products.                      OLIVIA Delvalle - CRNA   12/30/2022

## 2022-12-30 NOTE — H&P
Patient Name: Mina Muir  : 1980  MRN: [id number]  DATE: 22      ENDOSCOPY  History and Physical    Procedure:    [x] Diagnostic Colonoscopy       [] Screening Colonoscopy  [x] EGD      [] ERCP      [] EUS       [] Other    [x] Previous office notes/History and Physical reviewed from the patients chart. Please see EMR for further details of HPI. I have examined the patient's status immediately prior to the procedure and:      Indications/HPI:    [x]Abdominal Pain   []Cancer- GI/Lung  []Fhx of colon CA/polyps  []History of Polyps   []Browns   []Melena  []Abnormal Imaging   []Dysphagia    []Persistent Pneumonia  []Anemia   []Food Impaction  []History of Polyps  [x]GI Bleed   []Pulmonary nodule/Mass  []Change in bowel habits  [x]Heartburn/Reflux  []Rectal Bleed (BRBPR)  []Chest Pain - Non Cardiac  []Heme (+) Stool  []Ulcers  []Constipation   []Hemoptysis   []Varices  []Diarrhea   []Hypoxemia  []Nausea/Vomiting   []Screening   []Crohns/Colitis  []Other:    Anesthesia:   [x] MAC [] Moderate Sedation   [] General   [] None     ROS: 12 pt Review of Symptoms was negative unless mentioned above    Medications:   Prior to Admission medications    Medication Sig Start Date End Date Taking? Authorizing Provider   polyethylene glycol (MIRALAX) 17 g packet Samples given  Patient not taking: Reported on 2022   Nora Charles MD   busPIRone HCl (BUSPAR PO) Take by mouth    Historical Provider, MD   pantoprazole (PROTONIX) 40 MG tablet Take 1 tablet by mouth every morning (before breakfast) 22   Nora Charles MD   levothyroxine (SYNTHROID) 200 MCG tablet TAKE 1 TABLET BY MOUTH DAILY.  total dose 225 mcg DAILY 10/8/22   Richard Conklin MD   ondansetron (ZOFRAN-ODT) 8 MG TBDP disintegrating tablet DISSOLVE 1 (ONE) TABLET in mouth EVERY 8 HOURS AS NEEDED FOR NAUSEA AND VOMITING. 22   Historical Provider, MD   metroNIDAZOLE (FLAGYL) 500 MG tablet TAKE 1 TABLET BY MOUTH EVERY 12 HOURS FOR 7 DAYS. for partner treatment  Patient not taking: Reported on 12/30/2022 6/9/22   Historical Provider, MD   levothyroxine (SYNTHROID) 25 MCG tablet TAKE 1 TABLET BY MOUTH DAILY. total dose of 225 mcg DAILY 4/25/22   Fortino Ramirez MD   gabapentin (NEURONTIN) 300 MG capsule Take 1 capsule by mouth daily for 7 days, THEN 1 capsule 2 times daily for 7 days, THEN 1 capsule 3 times daily for 16 days. 3/3/22 12/30/22  Luis Eduardo Castro MD   meclizine (ANTIVERT) 25 MG tablet TAKE 1 TABLET BY MOUTH THREE TIMES DAILY AS NEEDED 2/15/22   Maxwell Saba MD   DULoxetine 40 MG CPEP Take 40 mg by mouth daily  Patient not taking: No sig reported 2/15/22   Maxwell Saba MD   LORazepam (ATIVAN) 1 MG tablet Take 1 tablet by mouth once for 1 dose.  Take 30 mins prior to MRI  Patient not taking: No sig reported 10/28/21   Historical Provider, MD   ARIPiprazole (ABILIFY) 5 MG tablet TAKE 1 TABLET BY MOUTH EVERY DAY  Patient not taking: No sig reported 9/17/21   Historical Provider, MD   vitamin D (ERGOCALCIFEROL) 1.25 MG (10230 UT) CAPS capsule TAKE 1 CAPSULE BY MOUTH ONCE A WEEK FOR VITAMIN D DEFICIENCY for 6 months. 7/23/21   Historical Provider, MD   ibuprofen (ADVIL;MOTRIN) 400 MG tablet TAKE 1 TABLET BY MOUTH THREE TIMES DAILY FOR 2 WEEKS  Patient not taking: No sig reported 9/1/21   Historical Provider, MD   traZODone (DESYREL) 50 MG tablet take a 1/2 tablet by mouth about 2 hours before bed to help with sleep; if this dose not help can increase 1 tablet before bed  Patient not taking: Reported on 12/30/2022 8/13/21   Historical Provider, MD   butalbital-acetaminophen-caffeine (FIORICET, ESGIC) -40 MG per tablet Take 1 tablet by mouth every 6 hours as needed for Headaches  Patient not taking: No sig reported 10/28/21   OLIVIA Martin - CNP   Multiple Vitamin (MULTIVITAMIN ADULT PO) Take by mouth  Patient not taking: No sig reported    Historical Provider, MD   metFORMIN (GLUCOPHAGE) 500 MG tablet Take 1 tablet by mouth 2 times daily (with meals)  Patient not taking: Reported on 12/30/2022 8/18/21   Bridgett Stoner MD   spironolactone (ALDACTONE) 50 MG tablet Take 1 tablet by mouth 2 times daily  Patient not taking: No sig reported 8/18/21   Bridgett Stoner MD   D3 SUPER STRENGTH 2000 units CAPS TAKE 1 CAPSULE DAILY  Patient not taking: Reported on 12/30/2022 8/13/17   Historical Provider, MD       Allergies: No Known Allergies     History of allergic reaction to anesthesia:  No    Past Medical History:  Past Medical History:   Diagnosis Date    Thyroid disease        Past Surgical History:  Past Surgical History:   Procedure Laterality Date    DENTAL SURGERY      THYROIDECTOMY      TUBAL LIGATION      US BREAST FINE NEEDLE ASPIRATION Right        Social History:  Social History     Tobacco Use    Smoking status: Never    Smokeless tobacco: Never   Vaping Use    Vaping Use: Never used   Substance Use Topics    Alcohol use: Yes     Alcohol/week: 0.0 standard drinks     Comment: Occassionally    Drug use: No       Vital Signs:   Vitals:    12/30/22 0952   BP: (!) 145/93   Pulse: 83   Resp: 18   Temp: 98.5 °F (36.9 °C)   SpO2: 100%        Physical Exam:  Cardiac:  [x]WNL  []Comments:  Pulmonary:  [x]WNL   []Comments:   Neuro/Mental Status:  [x]WNL  []Comments:  Abdominal:  [x]WNL    []Comments:  Other:   []WNL  []Comments:    Informed Consent:  The risks and benefits of the procedure have been discussed with either the patient or if they cannot consent, their representative. Assessment:  Patient examined and appropriate for planned sedation and procedure. Plan:  Proceed with planned sedation and procedure as above.     Yulia Echevarria MD  10:34 AM

## 2023-01-24 ENCOUNTER — OFFICE VISIT (OUTPATIENT)
Dept: GASTROENTEROLOGY | Age: 43
End: 2023-01-24
Payer: COMMERCIAL

## 2023-01-24 VITALS
BODY MASS INDEX: 34.72 KG/M2 | SYSTOLIC BLOOD PRESSURE: 112 MMHG | OXYGEN SATURATION: 98 % | WEIGHT: 196 LBS | HEART RATE: 67 BPM | DIASTOLIC BLOOD PRESSURE: 70 MMHG

## 2023-01-24 DIAGNOSIS — R11.0 NAUSEA: Primary | ICD-10-CM

## 2023-01-24 DIAGNOSIS — K59.00 CONSTIPATION, UNSPECIFIED CONSTIPATION TYPE: ICD-10-CM

## 2023-01-24 PROCEDURE — G8484 FLU IMMUNIZE NO ADMIN: HCPCS | Performed by: NURSE PRACTITIONER

## 2023-01-24 PROCEDURE — 99213 OFFICE O/P EST LOW 20 MIN: CPT | Performed by: NURSE PRACTITIONER

## 2023-01-24 PROCEDURE — 1036F TOBACCO NON-USER: CPT | Performed by: NURSE PRACTITIONER

## 2023-01-24 PROCEDURE — G8427 DOCREV CUR MEDS BY ELIG CLIN: HCPCS | Performed by: NURSE PRACTITIONER

## 2023-01-24 PROCEDURE — G8417 CALC BMI ABV UP PARAM F/U: HCPCS | Performed by: NURSE PRACTITIONER

## 2023-01-24 RX ORDER — ONDANSETRON 4 MG/1
4 TABLET, ORALLY DISINTEGRATING ORAL DAILY PRN
Qty: 30 TABLET | Refills: 1 | Status: SHIPPED | OUTPATIENT
Start: 2023-01-24 | End: 2023-02-23

## 2023-01-24 RX ORDER — ESCITALOPRAM OXALATE 10 MG/1
TABLET ORAL
COMMUNITY
Start: 2023-01-05

## 2023-01-24 ASSESSMENT — ENCOUNTER SYMPTOMS
CONSTIPATION: 1
ANAL BLEEDING: 0
DIARRHEA: 0
VOICE CHANGE: 0
ABDOMINAL DISTENTION: 0
BLOOD IN STOOL: 0
VOMITING: 0
CHEST TIGHTNESS: 0
TROUBLE SWALLOWING: 0
ABDOMINAL PAIN: 0
EYE REDNESS: 0
WHEEZING: 0
COLOR CHANGE: 0
NAUSEA: 1
PHOTOPHOBIA: 0
SHORTNESS OF BREATH: 0
EYE PAIN: 0
RECTAL PAIN: 0

## 2023-03-01 ENCOUNTER — OFFICE VISIT (OUTPATIENT)
Dept: OBGYN CLINIC | Age: 43
End: 2023-03-01
Payer: COMMERCIAL

## 2023-03-01 VITALS
SYSTOLIC BLOOD PRESSURE: 112 MMHG | HEIGHT: 63 IN | WEIGHT: 210 LBS | BODY MASS INDEX: 37.21 KG/M2 | DIASTOLIC BLOOD PRESSURE: 74 MMHG

## 2023-03-01 DIAGNOSIS — D25.1 INTRAMURAL AND SUBSEROUS LEIOMYOMA OF UTERUS: ICD-10-CM

## 2023-03-01 DIAGNOSIS — R10.2 PELVIC PAIN: Primary | ICD-10-CM

## 2023-03-01 DIAGNOSIS — D25.2 INTRAMURAL AND SUBSEROUS LEIOMYOMA OF UTERUS: ICD-10-CM

## 2023-03-01 PROCEDURE — G8484 FLU IMMUNIZE NO ADMIN: HCPCS | Performed by: OBSTETRICS & GYNECOLOGY

## 2023-03-01 PROCEDURE — 1036F TOBACCO NON-USER: CPT | Performed by: OBSTETRICS & GYNECOLOGY

## 2023-03-01 PROCEDURE — G8417 CALC BMI ABV UP PARAM F/U: HCPCS | Performed by: OBSTETRICS & GYNECOLOGY

## 2023-03-01 PROCEDURE — 99203 OFFICE O/P NEW LOW 30 MIN: CPT | Performed by: OBSTETRICS & GYNECOLOGY

## 2023-03-01 PROCEDURE — G8427 DOCREV CUR MEDS BY ELIG CLIN: HCPCS | Performed by: OBSTETRICS & GYNECOLOGY

## 2023-03-01 RX ORDER — HYDROXYZINE HYDROCHLORIDE 25 MG/1
TABLET, FILM COATED ORAL
COMMUNITY
Start: 2023-02-22

## 2023-03-01 RX ORDER — ONDANSETRON 4 MG/1
4 TABLET, FILM COATED ORAL EVERY 8 HOURS PRN
COMMUNITY

## 2023-03-01 SDOH — ECONOMIC STABILITY: FOOD INSECURITY: WITHIN THE PAST 12 MONTHS, THE FOOD YOU BOUGHT JUST DIDN'T LAST AND YOU DIDN'T HAVE MONEY TO GET MORE.: NEVER TRUE

## 2023-03-01 SDOH — ECONOMIC STABILITY: INCOME INSECURITY: HOW HARD IS IT FOR YOU TO PAY FOR THE VERY BASICS LIKE FOOD, HOUSING, MEDICAL CARE, AND HEATING?: NOT HARD AT ALL

## 2023-03-01 SDOH — ECONOMIC STABILITY: HOUSING INSECURITY
IN THE LAST 12 MONTHS, WAS THERE A TIME WHEN YOU DID NOT HAVE A STEADY PLACE TO SLEEP OR SLEPT IN A SHELTER (INCLUDING NOW)?: NO

## 2023-03-01 SDOH — ECONOMIC STABILITY: FOOD INSECURITY: WITHIN THE PAST 12 MONTHS, YOU WORRIED THAT YOUR FOOD WOULD RUN OUT BEFORE YOU GOT MONEY TO BUY MORE.: NEVER TRUE

## 2023-03-01 ASSESSMENT — ENCOUNTER SYMPTOMS
ABDOMINAL DISTENTION: 1
ABDOMINAL PAIN: 1
APNEA: 0
SHORTNESS OF BREATH: 0

## 2023-03-01 ASSESSMENT — PATIENT HEALTH QUESTIONNAIRE - PHQ9
SUM OF ALL RESPONSES TO PHQ QUESTIONS 1-9: 2
1. LITTLE INTEREST OR PLEASURE IN DOING THINGS: 1
SUM OF ALL RESPONSES TO PHQ QUESTIONS 1-9: 2
2. FEELING DOWN, DEPRESSED OR HOPELESS: 1
SUM OF ALL RESPONSES TO PHQ QUESTIONS 1-9: 2
SUM OF ALL RESPONSES TO PHQ QUESTIONS 1-9: 2
SUM OF ALL RESPONSES TO PHQ9 QUESTIONS 1 & 2: 2

## 2023-03-01 NOTE — PROGRESS NOTES
Chaperone for Intimate Exam  Chaperone was offered and accepted as part of the rooming process.   Chaperone: accepted

## 2023-03-01 NOTE — PROGRESS NOTES
Subjective:      Patient ID:  Thalia Rahman is a 40 y.o. female with chief complaint of:  Chief Complaint   Patient presents with    New Patient     Pt had US done last July was told she has endometrial fibroids and would need surgery        Patient is a 41 yo female with concerns related to new pelvic US. Patient has just become aware of fibroid uterus status. The chart was reviewed and of note was enlargement back as far as 2016 now in 7 yrs she has grown significantly. She denies any abnormal bleeding or bleeding after intercourse. She has notice difficulty closing clothing and pressure after a days work but was unaware it was related. Past Medical History:   Diagnosis Date    Anxiety     Depression     Thyroid disease      Past Surgical History:   Procedure Laterality Date    COLONOSCOPY N/A 12/30/2022    COLONOSCOPY DIAGNOSTIC performed by Alok Puri MD at [place] [address] GASTROINTESTINAL ENDOSCOPY N/A 12/30/2022    EGD ESOPHAGOGASTRODUODENOSCOPY performed by Alok Puri MD at [address] [place] [address]      Family History   Problem Relation Age of Onset    Colon Cancer Paternal Grandfather     Drug Abuse Father      Current Outpatient Medications on File Prior to Visit   Medication Sig Dispense Refill    hydrOXYzine HCl (ATARAX) 25 MG tablet       ondansetron (ZOFRAN) 4 MG tablet Take 4 mg by mouth every 8 hours as needed for Nausea or Vomiting      escitalopram (LEXAPRO) 10 MG tablet TAKE 1 TABLET BY MOUTH DAILY      pantoprazole (PROTONIX) 40 MG tablet Take 1 tablet by mouth 2 times daily (before meals) 180 tablet 1    polyethylene glycol (MIRALAX) 17 g packet Samples given (Patient not taking: No sig reported) 119 g 0    levothyroxine (SYNTHROID) 200 MCG tablet TAKE 1 TABLET BY MOUTH DAILY.  total dose 225 mcg DAILY 30 tablet 3    levothyroxine (SYNTHROID) 25 MCG tablet TAKE 1 TABLET BY MOUTH DAILY. total dose of 225 mcg DAILY 30 tablet 5    meclizine (ANTIVERT) 25 MG tablet TAKE 1 TABLET BY MOUTH THREE TIMES DAILY AS NEEDED 90 tablet 2    vitamin D (ERGOCALCIFEROL) 1.25 MG (61575 UT) CAPS capsule TAKE 1 CAPSULE BY MOUTH ONCE A WEEK FOR VITAMIN D DEFICIENCY for 6 months. busPIRone HCl (BUSPAR PO) Take by mouth (Patient not taking: No sig reported)      metroNIDAZOLE (FLAGYL) 500 MG tablet TAKE 1 TABLET BY MOUTH EVERY 12 HOURS FOR 7 DAYS. for partner treatment (Patient not taking: No sig reported)      gabapentin (NEURONTIN) 300 MG capsule Take 1 capsule by mouth daily for 7 days, THEN 1 capsule 2 times daily for 7 days, THEN 1 capsule 3 times daily for 16 days. 69 capsule 0    DULoxetine 40 MG CPEP Take 40 mg by mouth daily (Patient not taking: No sig reported) 90 capsule 2    LORazepam (ATIVAN) 1 MG tablet Take 1 tablet by mouth once for 1 dose.  Take 30 mins prior to MRI (Patient not taking: No sig reported)      ARIPiprazole (ABILIFY) 5 MG tablet TAKE 1 TABLET BY MOUTH EVERY DAY (Patient not taking: No sig reported)      ibuprofen (ADVIL;MOTRIN) 400 MG tablet TAKE 1 TABLET BY MOUTH THREE TIMES DAILY FOR 2 WEEKS (Patient not taking: No sig reported)      traZODone (DESYREL) 50 MG tablet take a 1/2 tablet by mouth about 2 hours before bed to help with sleep; if this dose not help can increase 1 tablet before bed (Patient not taking: No sig reported)      butalbital-acetaminophen-caffeine (FIORICET, ESGIC) -40 MG per tablet Take 1 tablet by mouth every 6 hours as needed for Headaches (Patient not taking: No sig reported) 40 tablet 3    Multiple Vitamin (MULTIVITAMIN ADULT PO) Take by mouth (Patient not taking: No sig reported)      metFORMIN (GLUCOPHAGE) 500 MG tablet Take 1 tablet by mouth 2 times daily (with meals) (Patient not taking: No sig reported) 180 tablet 1    spironolactone (ALDACTONE) 50 MG tablet Take 1 tablet by mouth 2 times daily (Patient not taking: No sig reported) 60 tablet 3    D3 SUPER STRENGTH 2000 units CAPS TAKE 1 CAPSULE DAILY (Patient not taking: No sig reported)  2     No current facility-administered medications on file prior to visit. Allergies:  Patient has no known allergies. Review of Systems   Constitutional:  Negative for fatigue and fever. Respiratory:  Negative for apnea and shortness of breath. Cardiovascular:  Negative for chest pain and palpitations. Gastrointestinal:  Positive for abdominal distention and abdominal pain. Genitourinary:  Positive for pelvic pain. Negative for difficulty urinating, dyspareunia, dysuria, vaginal bleeding and vaginal discharge. Neurological:  Negative for dizziness, weakness and light-headedness. Psychiatric/Behavioral:  Positive for dysphoric mood. Negative for agitation. Objective:   /74   Ht 5' 3\" (1.6 m)   Wt 210 lb (95.3 kg)   LMP 02/12/2023   BMI 37.20 kg/m²      Physical Exam  Constitutional:       Appearance: Normal appearance. She is well-developed. Eyes:      Pupils: Pupils are equal, round, and reactive to light. Cardiovascular:      Rate and Rhythm: Normal rate and regular rhythm. Heart sounds: Normal heart sounds. Pulmonary:      Effort: Pulmonary effort is normal.   Abdominal:      General: Bowel sounds are normal.      Palpations: Abdomen is soft. Comments: Uterine fundus is palpable a few finger breaths below the umbilicus. With lateral width   Neurological:      Mental Status: She is alert and oriented to person, place, and time. Psychiatric:         Mood and Affect: Mood normal.         Behavior: Behavior normal.       Assessment:       Diagnosis Orders   1. Pelvic pain        2. Intramural and subserous leiomyoma of uterus              Plan:      No orders of the defined types were placed in this encounter. No orders of the defined types were placed in this encounter. No follow-ups on file.      Shelagh Cranker, DO

## 2023-03-06 ENCOUNTER — TELEPHONE (OUTPATIENT)
Dept: OBGYN CLINIC | Age: 43
End: 2023-03-06

## 2023-03-09 ENCOUNTER — ANESTHESIA EVENT (OUTPATIENT)
Dept: OPERATING ROOM | Age: 43
DRG: 743 | End: 2023-03-09
Payer: COMMERCIAL

## 2023-03-09 ENCOUNTER — PREP FOR PROCEDURE (OUTPATIENT)
Dept: OBGYN CLINIC | Age: 43
End: 2023-03-09

## 2023-03-09 PROBLEM — R10.2 PELVIC PAIN IN FEMALE: Status: ACTIVE | Noted: 2023-03-09

## 2023-03-09 PROBLEM — D25.1 FIBROIDS, INTRAMURAL: Status: ACTIVE | Noted: 2023-03-09

## 2023-03-21 DIAGNOSIS — E89.0 POSTOPERATIVE HYPOTHYROIDISM: Primary | ICD-10-CM

## 2023-03-21 DIAGNOSIS — E89.0 POSTOPERATIVE HYPOTHYROIDISM: ICD-10-CM

## 2023-03-21 LAB
T4 FREE SERPL-MCNC: 1.17 NG/DL (ref 0.84–1.68)
TSH SERPL-MCNC: 6.47 UIU/ML (ref 0.44–3.86)

## 2023-03-24 RX ORDER — LEVOTHYROXINE SODIUM 0.2 MG/1
TABLET ORAL
Qty: 30 TABLET | Refills: 3 | Status: SHIPPED | OUTPATIENT
Start: 2023-03-24

## 2023-03-28 ENCOUNTER — TELEPHONE (OUTPATIENT)
Dept: OBGYN CLINIC | Age: 43
End: 2023-03-28

## 2023-03-28 NOTE — TELEPHONE ENCOUNTER
Pt called in regards to dates for leave for upcoming hysterectomy. She would like to take off starting Thursday the 13th which is a couple days prior, due to transporting her children to prep for surgery. Please advise.

## 2023-04-10 RX ORDER — SODIUM CHLORIDE 0.9 % (FLUSH) 0.9 %
5-40 SYRINGE (ML) INJECTION PRN
Status: CANCELLED | OUTPATIENT
Start: 2023-04-17

## 2023-04-10 RX ORDER — SODIUM CHLORIDE 0.9 % (FLUSH) 0.9 %
5-40 SYRINGE (ML) INJECTION EVERY 12 HOURS SCHEDULED
Status: CANCELLED | OUTPATIENT
Start: 2023-04-17

## 2023-04-10 RX ORDER — CEFAZOLIN SODIUM IN 0.9 % NACL 2 G/100 ML
2000 PLASTIC BAG, INJECTION (ML) INTRAVENOUS
Status: CANCELLED | OUTPATIENT
Start: 2023-04-17 | End: 2023-04-17

## 2023-04-10 RX ORDER — SODIUM CHLORIDE 9 MG/ML
INJECTION, SOLUTION INTRAVENOUS PRN
Status: CANCELLED | OUTPATIENT
Start: 2023-04-17

## 2023-04-17 ENCOUNTER — ANESTHESIA (OUTPATIENT)
Dept: OPERATING ROOM | Age: 43
DRG: 743 | End: 2023-04-17
Payer: COMMERCIAL

## 2023-04-17 ENCOUNTER — HOSPITAL ENCOUNTER (INPATIENT)
Age: 43
LOS: 2 days | Discharge: HOME | DRG: 743 | End: 2023-04-19
Attending: OBSTETRICS & GYNECOLOGY | Admitting: OBSTETRICS & GYNECOLOGY
Payer: COMMERCIAL

## 2023-04-17 DIAGNOSIS — R10.2 PELVIC PAIN IN FEMALE: ICD-10-CM

## 2023-04-17 DIAGNOSIS — D25.1 FIBROIDS, INTRAMURAL: ICD-10-CM

## 2023-04-17 DIAGNOSIS — G89.18 POSTOPERATIVE PAIN: Primary | ICD-10-CM

## 2023-04-17 PROBLEM — Z90.710 S/P TOTAL ABDOMINAL HYSTERECTOMY: Status: ACTIVE | Noted: 2023-04-17

## 2023-04-17 LAB
BASOPHILS # BLD: 0 K/UL (ref 0–0.2)
BASOPHILS NFR BLD: 0.4 %
EOSINOPHIL # BLD: 0 K/UL (ref 0–0.7)
EOSINOPHIL NFR BLD: 0.2 %
ERYTHROCYTE [DISTWIDTH] IN BLOOD BY AUTOMATED COUNT: 16.6 % (ref 11.5–14.5)
HCG, URINE, POC: NEGATIVE
HCT VFR BLD AUTO: 33.8 % (ref 37–47)
HGB BLD-MCNC: 10.8 G/DL (ref 12–16)
LYMPHOCYTES # BLD: 1.1 K/UL (ref 1–4.8)
LYMPHOCYTES NFR BLD: 11.1 %
Lab: NORMAL
MCH RBC QN AUTO: 25.8 PG (ref 27–31.3)
MCHC RBC AUTO-ENTMCNC: 31.9 % (ref 33–37)
MCV RBC AUTO: 80.6 FL (ref 79.4–94.8)
MONOCYTES # BLD: 0.1 K/UL (ref 0.2–0.8)
MONOCYTES NFR BLD: 1.4 %
NEGATIVE QC PASS/FAIL: NORMAL
NEUTROPHILS # BLD: 8.6 K/UL (ref 1.4–6.5)
NEUTS SEG NFR BLD: 86.9 %
PLATELET # BLD AUTO: 347 K/UL (ref 130–400)
POSITIVE QC PASS/FAIL: NORMAL
RBC # BLD AUTO: 4.19 M/UL (ref 4.2–5.4)
WBC # BLD AUTO: 10 K/UL (ref 4.8–10.8)

## 2023-04-17 PROCEDURE — 6360000002 HC RX W HCPCS: Performed by: NURSE ANESTHETIST, CERTIFIED REGISTERED

## 2023-04-17 PROCEDURE — 0UT00ZZ RESECTION OF RIGHT OVARY, OPEN APPROACH: ICD-10-PCS | Performed by: OBSTETRICS & GYNECOLOGY

## 2023-04-17 PROCEDURE — A4217 STERILE WATER/SALINE, 500 ML: HCPCS | Performed by: OBSTETRICS & GYNECOLOGY

## 2023-04-17 PROCEDURE — 64488 TAP BLOCK BI INJECTION: CPT | Performed by: STUDENT IN AN ORGANIZED HEALTH CARE EDUCATION/TRAINING PROGRAM

## 2023-04-17 PROCEDURE — 3600000004 HC SURGERY LEVEL 4 BASE: Performed by: OBSTETRICS & GYNECOLOGY

## 2023-04-17 PROCEDURE — 7100000001 HC PACU RECOVERY - ADDTL 15 MIN: Performed by: OBSTETRICS & GYNECOLOGY

## 2023-04-17 PROCEDURE — 6360000002 HC RX W HCPCS: Performed by: STUDENT IN AN ORGANIZED HEALTH CARE EDUCATION/TRAINING PROGRAM

## 2023-04-17 PROCEDURE — 2580000003 HC RX 258: Performed by: OBSTETRICS & GYNECOLOGY

## 2023-04-17 PROCEDURE — 6360000002 HC RX W HCPCS: Performed by: OBSTETRICS & GYNECOLOGY

## 2023-04-17 PROCEDURE — 1210000000 HC MED SURG R&B

## 2023-04-17 PROCEDURE — 3600000014 HC SURGERY LEVEL 4 ADDTL 15MIN: Performed by: OBSTETRICS & GYNECOLOGY

## 2023-04-17 PROCEDURE — 85025 COMPLETE CBC W/AUTO DIFF WBC: CPT

## 2023-04-17 PROCEDURE — 0UT90ZZ RESECTION OF UTERUS, OPEN APPROACH: ICD-10-PCS | Performed by: OBSTETRICS & GYNECOLOGY

## 2023-04-17 PROCEDURE — 6370000000 HC RX 637 (ALT 250 FOR IP): Performed by: STUDENT IN AN ORGANIZED HEALTH CARE EDUCATION/TRAINING PROGRAM

## 2023-04-17 PROCEDURE — 2500000003 HC RX 250 WO HCPCS: Performed by: NURSE ANESTHETIST, CERTIFIED REGISTERED

## 2023-04-17 PROCEDURE — 3700000000 HC ANESTHESIA ATTENDED CARE: Performed by: OBSTETRICS & GYNECOLOGY

## 2023-04-17 PROCEDURE — 2709999900 HC NON-CHARGEABLE SUPPLY: Performed by: OBSTETRICS & GYNECOLOGY

## 2023-04-17 PROCEDURE — 3700000001 HC ADD 15 MINUTES (ANESTHESIA): Performed by: OBSTETRICS & GYNECOLOGY

## 2023-04-17 PROCEDURE — 6370000000 HC RX 637 (ALT 250 FOR IP): Performed by: OBSTETRICS & GYNECOLOGY

## 2023-04-17 PROCEDURE — 2720000010 HC SURG SUPPLY STERILE: Performed by: OBSTETRICS & GYNECOLOGY

## 2023-04-17 PROCEDURE — 88307 TISSUE EXAM BY PATHOLOGIST: CPT

## 2023-04-17 PROCEDURE — 3E0T3BZ INTRODUCTION OF ANESTHETIC AGENT INTO PERIPHERAL NERVES AND PLEXI, PERCUTANEOUS APPROACH: ICD-10-PCS | Performed by: STUDENT IN AN ORGANIZED HEALTH CARE EDUCATION/TRAINING PROGRAM

## 2023-04-17 PROCEDURE — 7100000000 HC PACU RECOVERY - FIRST 15 MIN: Performed by: OBSTETRICS & GYNECOLOGY

## 2023-04-17 PROCEDURE — 0UT70ZZ RESECTION OF BILATERAL FALLOPIAN TUBES, OPEN APPROACH: ICD-10-PCS | Performed by: OBSTETRICS & GYNECOLOGY

## 2023-04-17 RX ORDER — OXYCODONE HYDROCHLORIDE AND ACETAMINOPHEN 5; 325 MG/1; MG/1
1 TABLET ORAL EVERY 4 HOURS PRN
Status: DISCONTINUED | OUTPATIENT
Start: 2023-04-17 | End: 2023-04-19 | Stop reason: HOSPADM

## 2023-04-17 RX ORDER — SODIUM CHLORIDE 0.9 % (FLUSH) 0.9 %
5-40 SYRINGE (ML) INJECTION EVERY 12 HOURS SCHEDULED
Status: DISCONTINUED | OUTPATIENT
Start: 2023-04-17 | End: 2023-04-17 | Stop reason: HOSPADM

## 2023-04-17 RX ORDER — ONDANSETRON 4 MG/1
4 TABLET, ORALLY DISINTEGRATING ORAL EVERY 8 HOURS PRN
Status: DISCONTINUED | OUTPATIENT
Start: 2023-04-17 | End: 2023-04-19 | Stop reason: HOSPADM

## 2023-04-17 RX ORDER — SODIUM CHLORIDE 9 MG/ML
INJECTION, SOLUTION INTRAVENOUS PRN
Status: DISCONTINUED | OUTPATIENT
Start: 2023-04-17 | End: 2023-04-17 | Stop reason: HOSPADM

## 2023-04-17 RX ORDER — HYDROXYZINE HYDROCHLORIDE 25 MG/1
25 TABLET, FILM COATED ORAL 3 TIMES DAILY PRN
Status: DISCONTINUED | OUTPATIENT
Start: 2023-04-17 | End: 2023-04-19 | Stop reason: HOSPADM

## 2023-04-17 RX ORDER — SODIUM CHLORIDE 9 MG/ML
INJECTION, SOLUTION INTRAVENOUS PRN
Status: DISCONTINUED | OUTPATIENT
Start: 2023-04-17 | End: 2023-04-19 | Stop reason: HOSPADM

## 2023-04-17 RX ORDER — ONDANSETRON 8 MG/1
TABLET, ORALLY DISINTEGRATING ORAL
COMMUNITY
Start: 2023-03-28

## 2023-04-17 RX ORDER — KETOROLAC TROMETHAMINE 30 MG/ML
30 INJECTION, SOLUTION INTRAMUSCULAR; INTRAVENOUS EVERY 6 HOURS
Status: DISPENSED | OUTPATIENT
Start: 2023-04-17 | End: 2023-04-18

## 2023-04-17 RX ORDER — LIDOCAINE HYDROCHLORIDE 20 MG/ML
INJECTION, SOLUTION INTRAVENOUS PRN
Status: DISCONTINUED | OUTPATIENT
Start: 2023-04-17 | End: 2023-04-17 | Stop reason: SDUPTHER

## 2023-04-17 RX ORDER — DIPHENHYDRAMINE HYDROCHLORIDE 50 MG/ML
12.5 INJECTION INTRAMUSCULAR; INTRAVENOUS
Status: DISCONTINUED | OUTPATIENT
Start: 2023-04-17 | End: 2023-04-17 | Stop reason: HOSPADM

## 2023-04-17 RX ORDER — MEPERIDINE HYDROCHLORIDE 25 MG/ML
12.5 INJECTION INTRAMUSCULAR; INTRAVENOUS; SUBCUTANEOUS
Status: COMPLETED | OUTPATIENT
Start: 2023-04-17 | End: 2023-04-17

## 2023-04-17 RX ORDER — CEFAZOLIN SODIUM IN 0.9 % NACL 2 G/100 ML
2000 PLASTIC BAG, INJECTION (ML) INTRAVENOUS
Status: COMPLETED | OUTPATIENT
Start: 2023-04-17 | End: 2023-04-17

## 2023-04-17 RX ORDER — PROPOFOL 10 MG/ML
INJECTION, EMULSION INTRAVENOUS PRN
Status: DISCONTINUED | OUTPATIENT
Start: 2023-04-17 | End: 2023-04-17 | Stop reason: SDUPTHER

## 2023-04-17 RX ORDER — SCOLOPAMINE TRANSDERMAL SYSTEM 1 MG/1
1 PATCH, EXTENDED RELEASE TRANSDERMAL ONCE
Status: DISCONTINUED | OUTPATIENT
Start: 2023-04-17 | End: 2023-04-17

## 2023-04-17 RX ORDER — GABAPENTIN 100 MG/1
CAPSULE ORAL
COMMUNITY
Start: 2023-03-23

## 2023-04-17 RX ORDER — ROPIVACAINE HYDROCHLORIDE 5 MG/ML
INJECTION, SOLUTION EPIDURAL; INFILTRATION; PERINEURAL
Status: COMPLETED | OUTPATIENT
Start: 2023-04-17 | End: 2023-04-17

## 2023-04-17 RX ORDER — MAGNESIUM HYDROXIDE 1200 MG/15ML
LIQUID ORAL CONTINUOUS PRN
Status: DISCONTINUED | OUTPATIENT
Start: 2023-04-17 | End: 2023-04-17 | Stop reason: HOSPADM

## 2023-04-17 RX ORDER — DOCUSATE SODIUM 100 MG/1
100 CAPSULE, LIQUID FILLED ORAL 2 TIMES DAILY
Status: DISCONTINUED | OUTPATIENT
Start: 2023-04-17 | End: 2023-04-19 | Stop reason: HOSPADM

## 2023-04-17 RX ORDER — MIDAZOLAM HYDROCHLORIDE 1 MG/ML
INJECTION INTRAMUSCULAR; INTRAVENOUS PRN
Status: DISCONTINUED | OUTPATIENT
Start: 2023-04-17 | End: 2023-04-17 | Stop reason: SDUPTHER

## 2023-04-17 RX ORDER — PANTOPRAZOLE SODIUM 40 MG/1
40 TABLET, DELAYED RELEASE ORAL
Status: DISCONTINUED | OUTPATIENT
Start: 2023-04-17 | End: 2023-04-19 | Stop reason: HOSPADM

## 2023-04-17 RX ORDER — KETOROLAC TROMETHAMINE 30 MG/ML
INJECTION, SOLUTION INTRAMUSCULAR; INTRAVENOUS PRN
Status: DISCONTINUED | OUTPATIENT
Start: 2023-04-17 | End: 2023-04-17 | Stop reason: SDUPTHER

## 2023-04-17 RX ORDER — SODIUM CHLORIDE 0.9 % (FLUSH) 0.9 %
5-40 SYRINGE (ML) INJECTION EVERY 12 HOURS SCHEDULED
Status: DISCONTINUED | OUTPATIENT
Start: 2023-04-17 | End: 2023-04-19 | Stop reason: HOSPADM

## 2023-04-17 RX ORDER — ONDANSETRON 2 MG/ML
INJECTION INTRAMUSCULAR; INTRAVENOUS PRN
Status: DISCONTINUED | OUTPATIENT
Start: 2023-04-17 | End: 2023-04-17 | Stop reason: SDUPTHER

## 2023-04-17 RX ORDER — ROCURONIUM BROMIDE 10 MG/ML
INJECTION, SOLUTION INTRAVENOUS PRN
Status: DISCONTINUED | OUTPATIENT
Start: 2023-04-17 | End: 2023-04-17 | Stop reason: SDUPTHER

## 2023-04-17 RX ORDER — GABAPENTIN 100 MG/1
200 CAPSULE ORAL 2 TIMES DAILY
Status: DISCONTINUED | OUTPATIENT
Start: 2023-04-17 | End: 2023-04-19 | Stop reason: HOSPADM

## 2023-04-17 RX ORDER — ONDANSETRON 2 MG/ML
4 INJECTION INTRAMUSCULAR; INTRAVENOUS EVERY 6 HOURS PRN
Status: DISCONTINUED | OUTPATIENT
Start: 2023-04-17 | End: 2023-04-19 | Stop reason: HOSPADM

## 2023-04-17 RX ORDER — SODIUM CHLORIDE 0.9 % (FLUSH) 0.9 %
5-40 SYRINGE (ML) INJECTION PRN
Status: DISCONTINUED | OUTPATIENT
Start: 2023-04-17 | End: 2023-04-19 | Stop reason: HOSPADM

## 2023-04-17 RX ORDER — OXYCODONE HYDROCHLORIDE 5 MG/1
5 TABLET ORAL
Status: COMPLETED | OUTPATIENT
Start: 2023-04-17 | End: 2023-04-17

## 2023-04-17 RX ORDER — LIDOCAINE HYDROCHLORIDE 10 MG/ML
1 INJECTION, SOLUTION EPIDURAL; INFILTRATION; INTRACAUDAL; PERINEURAL
Status: DISCONTINUED | OUTPATIENT
Start: 2023-04-17 | End: 2023-04-17 | Stop reason: HOSPADM

## 2023-04-17 RX ORDER — DEXAMETHASONE SODIUM PHOSPHATE 10 MG/ML
INJECTION INTRAMUSCULAR; INTRAVENOUS PRN
Status: DISCONTINUED | OUTPATIENT
Start: 2023-04-17 | End: 2023-04-17 | Stop reason: SDUPTHER

## 2023-04-17 RX ORDER — FENTANYL CITRATE 50 UG/ML
INJECTION, SOLUTION INTRAMUSCULAR; INTRAVENOUS PRN
Status: DISCONTINUED | OUTPATIENT
Start: 2023-04-17 | End: 2023-04-17 | Stop reason: SDUPTHER

## 2023-04-17 RX ORDER — ESCITALOPRAM OXALATE 10 MG/1
10 TABLET ORAL DAILY
Status: DISCONTINUED | OUTPATIENT
Start: 2023-04-17 | End: 2023-04-19 | Stop reason: HOSPADM

## 2023-04-17 RX ORDER — SODIUM CHLORIDE, SODIUM LACTATE, POTASSIUM CHLORIDE, CALCIUM CHLORIDE 600; 310; 30; 20 MG/100ML; MG/100ML; MG/100ML; MG/100ML
INJECTION, SOLUTION INTRAVENOUS CONTINUOUS
Status: DISCONTINUED | OUTPATIENT
Start: 2023-04-17 | End: 2023-04-18

## 2023-04-17 RX ORDER — SODIUM CHLORIDE 0.9 % (FLUSH) 0.9 %
5-40 SYRINGE (ML) INJECTION PRN
Status: DISCONTINUED | OUTPATIENT
Start: 2023-04-17 | End: 2023-04-17 | Stop reason: HOSPADM

## 2023-04-17 RX ORDER — SCOLOPAMINE TRANSDERMAL SYSTEM 1 MG/1
PATCH, EXTENDED RELEASE TRANSDERMAL
COMMUNITY
Start: 2023-03-21

## 2023-04-17 RX ORDER — IBUPROFEN 400 MG/1
800 TABLET ORAL EVERY 8 HOURS PRN
Status: DISCONTINUED | OUTPATIENT
Start: 2023-04-18 | End: 2023-04-19 | Stop reason: HOSPADM

## 2023-04-17 RX ORDER — LEVOTHYROXINE SODIUM 0.1 MG/1
200 TABLET ORAL DAILY
Status: DISCONTINUED | OUTPATIENT
Start: 2023-04-17 | End: 2023-04-19 | Stop reason: HOSPADM

## 2023-04-17 RX ORDER — FENTANYL CITRATE 0.05 MG/ML
50 INJECTION, SOLUTION INTRAMUSCULAR; INTRAVENOUS EVERY 10 MIN PRN
Status: DISCONTINUED | OUTPATIENT
Start: 2023-04-17 | End: 2023-04-17 | Stop reason: HOSPADM

## 2023-04-17 RX ORDER — SODIUM CHLORIDE 9 MG/ML
25 INJECTION, SOLUTION INTRAVENOUS PRN
Status: DISCONTINUED | OUTPATIENT
Start: 2023-04-17 | End: 2023-04-17 | Stop reason: HOSPADM

## 2023-04-17 RX ORDER — METOCLOPRAMIDE HYDROCHLORIDE 5 MG/ML
10 INJECTION INTRAMUSCULAR; INTRAVENOUS
Status: DISCONTINUED | OUTPATIENT
Start: 2023-04-17 | End: 2023-04-17 | Stop reason: HOSPADM

## 2023-04-17 RX ORDER — ONDANSETRON 2 MG/ML
4 INJECTION INTRAMUSCULAR; INTRAVENOUS
Status: COMPLETED | OUTPATIENT
Start: 2023-04-17 | End: 2023-04-17

## 2023-04-17 RX ADMIN — PANTOPRAZOLE SODIUM 40 MG: 40 TABLET, DELAYED RELEASE ORAL at 17:21

## 2023-04-17 RX ADMIN — SODIUM CHLORIDE, POTASSIUM CHLORIDE, SODIUM LACTATE AND CALCIUM CHLORIDE: 600; 310; 30; 20 INJECTION, SOLUTION INTRAVENOUS at 16:36

## 2023-04-17 RX ADMIN — LEVOTHYROXINE SODIUM 200 MCG: 0.1 TABLET ORAL at 16:34

## 2023-04-17 RX ADMIN — ONDANSETRON 4 MG: 2 INJECTION INTRAMUSCULAR; INTRAVENOUS at 10:03

## 2023-04-17 RX ADMIN — ESCITALOPRAM OXALATE 10 MG: 10 TABLET ORAL at 17:20

## 2023-04-17 RX ADMIN — FENTANYL CITRATE 50 MCG: 50 INJECTION, SOLUTION INTRAMUSCULAR; INTRAVENOUS at 09:19

## 2023-04-17 RX ADMIN — KETOROLAC TROMETHAMINE 30 MG: 30 INJECTION, SOLUTION INTRAMUSCULAR; INTRAVENOUS at 09:19

## 2023-04-17 RX ADMIN — ROCURONIUM BROMIDE 80 MG: 10 INJECTION INTRAVENOUS at 07:43

## 2023-04-17 RX ADMIN — FENTANYL CITRATE 50 MCG: 0.05 INJECTION, SOLUTION INTRAMUSCULAR; INTRAVENOUS at 10:49

## 2023-04-17 RX ADMIN — ROPIVACAINE HYDROCHLORIDE 30 ML: 5 INJECTION, SOLUTION EPIDURAL; INFILTRATION; PERINEURAL at 07:43

## 2023-04-17 RX ADMIN — DEXAMETHASONE SODIUM PHOSPHATE 10 MG: 10 INJECTION INTRAMUSCULAR; INTRAVENOUS at 07:56

## 2023-04-17 RX ADMIN — LIDOCAINE HYDROCHLORIDE 80 MG: 20 INJECTION, SOLUTION INTRAVENOUS at 07:43

## 2023-04-17 RX ADMIN — FENTANYL CITRATE 100 MCG: 50 INJECTION, SOLUTION INTRAMUSCULAR; INTRAVENOUS at 07:43

## 2023-04-17 RX ADMIN — SODIUM CHLORIDE 1000 ML: 9 INJECTION, SOLUTION INTRAVENOUS at 06:57

## 2023-04-17 RX ADMIN — SUGAMMADEX 200 MG: 100 INJECTION, SOLUTION INTRAVENOUS at 09:20

## 2023-04-17 RX ADMIN — PROPOFOL 200 MG: 10 INJECTION, EMULSION INTRAVENOUS at 07:43

## 2023-04-17 RX ADMIN — MEPERIDINE HYDROCHLORIDE 12.5 MG: 25 INJECTION, SOLUTION INTRAMUSCULAR; INTRAVENOUS; SUBCUTANEOUS at 10:04

## 2023-04-17 RX ADMIN — FENTANYL CITRATE 50 MCG: 50 INJECTION, SOLUTION INTRAMUSCULAR; INTRAVENOUS at 08:33

## 2023-04-17 RX ADMIN — ONDANSETRON 4 MG: 2 INJECTION INTRAMUSCULAR; INTRAVENOUS at 09:19

## 2023-04-17 RX ADMIN — OXYCODONE 5 MG: 5 TABLET ORAL at 14:41

## 2023-04-17 RX ADMIN — GABAPENTIN 200 MG: 100 CAPSULE ORAL at 20:13

## 2023-04-17 RX ADMIN — Medication 2000 MG: at 07:39

## 2023-04-17 RX ADMIN — MIDAZOLAM HYDROCHLORIDE 2 MG: 1 INJECTION, SOLUTION INTRAMUSCULAR; INTRAVENOUS at 07:37

## 2023-04-17 RX ADMIN — SODIUM CHLORIDE: 9 INJECTION, SOLUTION INTRAVENOUS at 09:20

## 2023-04-17 RX ADMIN — DOCUSATE SODIUM 100 MG: 100 CAPSULE, LIQUID FILLED ORAL at 20:13

## 2023-04-17 RX ADMIN — KETOROLAC TROMETHAMINE 30 MG: 30 INJECTION, SOLUTION INTRAMUSCULAR; INTRAVENOUS at 18:42

## 2023-04-17 ASSESSMENT — PAIN SCALES - GENERAL
PAINLEVEL_OUTOF10: 4
PAINLEVEL_OUTOF10: 4
PAINLEVEL_OUTOF10: 6
PAINLEVEL_OUTOF10: 3
PAINLEVEL_OUTOF10: 4
PAINLEVEL_OUTOF10: 2
PAINLEVEL_OUTOF10: 6
PAINLEVEL_OUTOF10: 2
PAINLEVEL_OUTOF10: 6
PAINLEVEL_OUTOF10: 6
PAINLEVEL_OUTOF10: 4
PAINLEVEL_OUTOF10: 2
PAINLEVEL_OUTOF10: 3
PAINLEVEL_OUTOF10: 4
PAINLEVEL_OUTOF10: 4
PAINLEVEL_OUTOF10: 6
PAINLEVEL_OUTOF10: 2
PAINLEVEL_OUTOF10: 3
PAINLEVEL_OUTOF10: 4

## 2023-04-17 ASSESSMENT — PAIN DESCRIPTION - ORIENTATION
ORIENTATION: LOWER

## 2023-04-17 ASSESSMENT — PAIN DESCRIPTION - LOCATION
LOCATION: ABDOMEN

## 2023-04-17 ASSESSMENT — PAIN DESCRIPTION - FREQUENCY
FREQUENCY: CONTINUOUS
FREQUENCY: INTERMITTENT
FREQUENCY: CONTINUOUS

## 2023-04-17 ASSESSMENT — PAIN DESCRIPTION - ONSET
ONSET: PROGRESSIVE

## 2023-04-17 ASSESSMENT — PAIN DESCRIPTION - DESCRIPTORS
DESCRIPTORS: CRAMPING;ACHING
DESCRIPTORS: ACHING;CRAMPING;DULL
DESCRIPTORS: ACHING;PRESSURE;THROBBING
DESCRIPTORS: ACHING;DULL
DESCRIPTORS: CRAMPING
DESCRIPTORS: ACHING;CRAMPING
DESCRIPTORS: ACHING;CRAMPING;DULL
DESCRIPTORS: ACHING;CRAMPING;DULL
DESCRIPTORS: ACHING;CRAMPING

## 2023-04-17 ASSESSMENT — PAIN DESCRIPTION - PAIN TYPE
TYPE: SURGICAL PAIN

## 2023-04-17 ASSESSMENT — PAIN - FUNCTIONAL ASSESSMENT: PAIN_FUNCTIONAL_ASSESSMENT: NONE - DENIES PAIN

## 2023-04-17 NOTE — OP NOTE
Operative Note      Patient: Nick Goodson  YOB: 1980  MRN: [id number]    Date of Procedure: 4/17/2023    Pre-Op Diagnosis Codes:     * Pelvic pain in female [R10.2]     * Fibroids, intramural [D25.1]    Post-Op Diagnosis: Same       Procedure(s):  EUA, TOTAL ABDOMINAL HYSTERECTOMY WITH BILATERAL SALPINGECTOMY, RIGHT OOPHORECTOMY    Surgeon(s):  Mona Borrego DO    Assistant:   First Assistant: Chintan Dunn    Anesthesia: General    Estimated Blood Loss (mL): less than 140     Complications: None    Specimens:   ID Type Source Tests Collected by Time Destination   A : uterus, cervix, bilateral tubes, right ovary  Tissue Uterus SURGICAL PATHOLOGY Mona Borrego DO 4/17/2023 0849        Implants:        Drains: urine clear     Findings: enlarged uterus flishe clips times two bilaterally adhesions right ovary normal left      Detailed Description of Procedure:   PROCEDURE NOTE: TOTAL ABDOMINAL HYSTERECTOMY WITH BILATERAL SALPINGOOPHORECTOMY    40 y.o.  female who presents today with h/o persistent pelvic pain and uterine fibriods noted on US. Risks, benefits and alternative therapies for treatment were discussed with the patient. The patient desires definitive surgical management and the patient is planned for hysterectomy. The patient understands the risk of possible open hysterectomy. The patient was taken to the OR where she was prepped and draped in the normal sterile fashion in a dorsal lithotomy position. Time out was then taken to ensure proper patient, placement and procedure. Exam under anesthesia noted a large fibroid uterus and in light of size a vaginal approach was not recommended. Based on exam the decision was made to forgo laparoscopic surgery and proceed with total abdominal hysterectomy. A Pfannenstiel incision was made carried down the the underlying fascia which was incised on the midline and extended laterally with cautery.  The peritoneum was entered bluntly and

## 2023-04-17 NOTE — ANESTHESIA POSTPROCEDURE EVALUATION
Department of Anesthesiology  Postprocedure Note    Patient: Del Carbajal  MRN: [id number]  YOB: 1980  Date of evaluation: 4/17/2023      Procedure Summary     Date: 04/17/23 Room / Location: [address] [place] [address]    Anesthesia Start: 6229 Anesthesia Stop: 0945    Procedure: EUA, TOTAL ABDOMINAL HYSTERECTOMY WITH BILATERAL SALPINGECTOMY, RIGHT OOPHORECTOMY (Abdomen) Diagnosis:       Pelvic pain in female      Fibroids, intramural      (Pelvic pain in female [R10.2])      (Fibroids, intramural [D25.1])    Surgeons: Valerio Jordan DO Responsible Provider: Leanne Mccracken DO    Anesthesia Type: general, regional ASA Status: 3          Anesthesia Type: No value filed.     Gallo Phase I: Gallo Score: 9    Gallo Phase II:        Anesthesia Post Evaluation    Patient location during evaluation: PACU  Patient participation: waiting for patient participation  Level of consciousness: sleepy but conscious  Pain score: 0  Airway patency: patent  Nausea & Vomiting: no vomiting and no nausea  Complications: no  Cardiovascular status: hemodynamically stable  Respiratory status: face mask and acceptable  Hydration status: stable  Multimodal analgesia pain management approach

## 2023-04-17 NOTE — PROGRESS NOTES
Patient is A&Ox4. Patient stated she had pain of 6, she refused the scheduled Toradol stating that she wants to wait for the 1900 dose. VS, assessment completed and required med's given per [facility]. Abdominal  binder placed and incision bandage is clean dry and intact. Buenrostro working as required. Med's were reconciled, Dr. Cayla Strong had them scheduled already. Patient denied any further needs and I continued to monitor. Call light is within reach and bed is in lowest position.  Electronically signed by Lem Canavan, RN on 4/17/2023 at 5:58 PM

## 2023-04-17 NOTE — ANESTHESIA PRE PROCEDURE
-40 MG per tablet Take 1 tablet by mouth every 6 hours as needed for Headaches  Patient not taking: Reported on 6/14/2022 10/28/21   OLIVIA Celeste - QUINCY   Multiple Vitamin (MULTIVITAMIN ADULT PO) Take by mouth  Patient not taking: Reported on 6/14/2022    Historical Provider, MD   metFORMIN (GLUCOPHAGE) 500 MG tablet Take 1 tablet by mouth 2 times daily (with meals)  Patient not taking: Reported on 12/30/2022 8/18/21   Arliss Heimlich, MD       Current medications:    Current Facility-Administered Medications   Medication Dose Route Frequency Provider Last Rate Last Admin    sodium chloride flush 0.9 % injection 5-40 mL  5-40 mL IntraVENous 2 times per day Shayy Hurl, DO        sodium chloride flush 0.9 % injection 5-40 mL  5-40 mL IntraVENous PRN Shayy Hurl, DO        0.9 % sodium chloride infusion   IntraVENous PRN Shayy Hurl,  mL/hr at 04/17/23 0657 1,000 mL at 04/17/23 0657    ceFAZolin (ANCEF) 2000 mg in 0.9% sodium chloride 100 mL IVPB  2,000 mg IntraVENous On Call to 10 Sally Russell DO        scopolamine (TRANSDERM-SCOP) transdermal patch 1 patch  1 patch TransDERmal Once Jayme Gauthier DO   1 patch at 04/17/23 0703       Allergies:  No Known Allergies    Problem List:    Patient Active Problem List   Diagnosis Code    Hypothyroidism E03.9    Gastroesophageal reflux disease K21.9    Morbid obesity due to excess calories (HCC) E66.01    Cutaneous abscess of face L02.01    Acanthosis nigricans L83    Congenital spondylolysis, lumbosacral region Q76.2    Contact w and exposure to oth viral communicable diseases Z20.828    Contact with and (suspected) exposure to UHYDR-99 K85.174    Folliculitis G70.8    Low vitamin D level R79.89    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

## 2023-04-17 NOTE — H&P
I attest that I have reviewed the H&P with the patient. All risks, benefits and alternative therapies were reviewed and the patient agrees to proceed with plan of care.    Lotus Lomeli, DO

## 2023-04-17 NOTE — ANESTHESIA PROCEDURE NOTES
Peripheral Block    Patient location during procedure: pre-op  Reason for block: post-op pain management and at surgeon's request  Start time: 4/17/2023 7:43 AM  End time: 4/17/2023 7:48 AM  Staffing  Performed: anesthesiologist   Anesthesiologist: Kat Montero DO  Preanesthetic Checklist  Completed: patient identified, IV checked, site marked, risks and benefits discussed, surgical/procedural consents, equipment checked, pre-op evaluation, timeout performed, anesthesia consent given, oxygen available and monitors applied/VS acknowledged  Peripheral Block   Patient position: supine  Prep: ChloraPrep  Provider prep: mask and sterile gloves (Sterile probe cover)  Patient monitoring: cardiac monitor, continuous pulse ox, frequent blood pressure checks and IV access  Block type: TAP  Laterality: bilateral  Injection technique: single-shot  Guidance: ultrasound guided  Local infiltration: ropivacaine  Infiltration strength: 0.5 %  Local infiltration: ropivacaine  Dose: 30 mL    Needle   Needle type: combined needle/nerve stimulator   Needle gauge: 22 G  Needle localization: anatomical landmarks and ultrasound guidance  Needle length: 5 cm  Assessment   Injection assessment: negative aspiration for heme, no paresthesia on injection and local visualized surrounding nerve on ultrasound  Paresthesia pain: immediately resolved  Slow fractionated injection: yes  Hemodynamics: stable  Real-time US image taken/store: yes    Additional Notes  Ultrasound image printed and saved in patient chart.     Sterile probe cover used    Ropivacaine 0.5% 30 ml + saline 10 ml    20 ml bilateral  Medications Administered  ropivacaine (NAROPIN) injection 0.5% - Perineural   30 mL - 4/17/2023 7:43:00 AM

## 2023-04-18 LAB
ALBUMIN SERPL-MCNC: 3.4 G/DL (ref 3.5–4.6)
ALP SERPL-CCNC: 58 U/L (ref 40–130)
ALT SERPL-CCNC: 10 U/L (ref 0–33)
ANION GAP SERPL CALCULATED.3IONS-SCNC: 9 MEQ/L (ref 9–15)
AST SERPL-CCNC: 14 U/L (ref 0–35)
BILIRUB SERPL-MCNC: 0.4 MG/DL (ref 0.2–0.7)
BUN SERPL-MCNC: 8 MG/DL (ref 6–20)
CALCIUM SERPL-MCNC: 8.6 MG/DL (ref 8.5–9.9)
CHLORIDE SERPL-SCNC: 107 MEQ/L (ref 95–107)
CO2 SERPL-SCNC: 24 MEQ/L (ref 20–31)
CREAT SERPL-MCNC: 0.8 MG/DL (ref 0.5–0.9)
ERYTHROCYTE [DISTWIDTH] IN BLOOD BY AUTOMATED COUNT: 16.6 % (ref 11.5–14.5)
GLOBULIN SER CALC-MCNC: 2.6 G/DL (ref 2.3–3.5)
GLUCOSE SERPL-MCNC: 99 MG/DL (ref 70–99)
HCT VFR BLD AUTO: 31.5 % (ref 37–47)
HGB BLD-MCNC: 10.1 G/DL (ref 12–16)
MCH RBC QN AUTO: 25.7 PG (ref 27–31.3)
MCHC RBC AUTO-ENTMCNC: 31.9 % (ref 33–37)
MCV RBC AUTO: 80.7 FL (ref 79.4–94.8)
PLATELET # BLD AUTO: 341 K/UL (ref 130–400)
POTASSIUM SERPL-SCNC: 3.6 MEQ/L (ref 3.4–4.9)
PROT SERPL-MCNC: 6 G/DL (ref 6.3–8)
RBC # BLD AUTO: 3.91 M/UL (ref 4.2–5.4)
SODIUM SERPL-SCNC: 140 MEQ/L (ref 135–144)
WBC # BLD AUTO: 12.6 K/UL (ref 4.8–10.8)

## 2023-04-18 PROCEDURE — 1210000000 HC MED SURG R&B

## 2023-04-18 PROCEDURE — 36415 COLL VENOUS BLD VENIPUNCTURE: CPT

## 2023-04-18 PROCEDURE — 6360000002 HC RX W HCPCS: Performed by: OBSTETRICS & GYNECOLOGY

## 2023-04-18 PROCEDURE — 80053 COMPREHEN METABOLIC PANEL: CPT

## 2023-04-18 PROCEDURE — 2580000003 HC RX 258: Performed by: OBSTETRICS & GYNECOLOGY

## 2023-04-18 PROCEDURE — 85027 COMPLETE CBC AUTOMATED: CPT

## 2023-04-18 PROCEDURE — 6370000000 HC RX 637 (ALT 250 FOR IP): Performed by: OBSTETRICS & GYNECOLOGY

## 2023-04-18 RX ORDER — PSEUDOEPHEDRINE HCL 30 MG
100 TABLET ORAL 2 TIMES DAILY
Qty: 60 CAPSULE | Refills: 2 | Status: SHIPPED | OUTPATIENT
Start: 2023-04-18

## 2023-04-18 RX ORDER — HYDROCODONE BITARTRATE AND ACETAMINOPHEN 5; 325 MG/1; MG/1
1 TABLET ORAL EVERY 6 HOURS PRN
Qty: 20 TABLET | Refills: 0 | Status: SHIPPED | OUTPATIENT
Start: 2023-04-18 | End: 2023-04-23

## 2023-04-18 RX ORDER — IBUPROFEN 800 MG/1
800 TABLET ORAL EVERY 8 HOURS PRN
Qty: 90 TABLET | Refills: 1 | Status: SHIPPED | OUTPATIENT
Start: 2023-04-18

## 2023-04-18 RX ORDER — HYDROCODONE BITARTRATE AND ACETAMINOPHEN 5; 325 MG/1; MG/1
1 TABLET ORAL EVERY 6 HOURS PRN
Status: DISCONTINUED | OUTPATIENT
Start: 2023-04-18 | End: 2023-04-19 | Stop reason: HOSPADM

## 2023-04-18 RX ADMIN — GABAPENTIN 200 MG: 100 CAPSULE ORAL at 08:43

## 2023-04-18 RX ADMIN — DOCUSATE SODIUM 100 MG: 100 CAPSULE, LIQUID FILLED ORAL at 20:35

## 2023-04-18 RX ADMIN — DOCUSATE SODIUM 100 MG: 100 CAPSULE, LIQUID FILLED ORAL at 08:45

## 2023-04-18 RX ADMIN — PANTOPRAZOLE SODIUM 40 MG: 40 TABLET, DELAYED RELEASE ORAL at 05:59

## 2023-04-18 RX ADMIN — ESCITALOPRAM OXALATE 10 MG: 10 TABLET ORAL at 08:45

## 2023-04-18 RX ADMIN — LEVOTHYROXINE SODIUM 200 MCG: 0.1 TABLET ORAL at 05:59

## 2023-04-18 RX ADMIN — GABAPENTIN 200 MG: 100 CAPSULE ORAL at 20:35

## 2023-04-18 RX ADMIN — SODIUM CHLORIDE, POTASSIUM CHLORIDE, SODIUM LACTATE AND CALCIUM CHLORIDE: 600; 310; 30; 20 INJECTION, SOLUTION INTRAVENOUS at 08:47

## 2023-04-18 RX ADMIN — KETOROLAC TROMETHAMINE 30 MG: 30 INJECTION, SOLUTION INTRAMUSCULAR; INTRAVENOUS at 05:58

## 2023-04-18 RX ADMIN — HYDROCODONE BITARTRATE AND ACETAMINOPHEN 1 TABLET: 5; 325 TABLET ORAL at 22:54

## 2023-04-18 RX ADMIN — Medication 5 ML: at 20:39

## 2023-04-18 RX ADMIN — PANTOPRAZOLE SODIUM 40 MG: 40 TABLET, DELAYED RELEASE ORAL at 16:40

## 2023-04-18 RX ADMIN — KETOROLAC TROMETHAMINE 30 MG: 30 INJECTION, SOLUTION INTRAMUSCULAR; INTRAVENOUS at 00:45

## 2023-04-18 ASSESSMENT — PAIN DESCRIPTION - LOCATION
LOCATION: ABDOMEN
LOCATION: OTHER (COMMENT)
LOCATION: ABDOMEN

## 2023-04-18 ASSESSMENT — PAIN SCALES - GENERAL
PAINLEVEL_OUTOF10: 3
PAINLEVEL_OUTOF10: 3
PAINLEVEL_OUTOF10: 2
PAINLEVEL_OUTOF10: 5
PAINLEVEL_OUTOF10: 2

## 2023-04-18 ASSESSMENT — PAIN DESCRIPTION - DESCRIPTORS
DESCRIPTORS: CRAMPING
DESCRIPTORS: CRAMPING

## 2023-04-18 ASSESSMENT — PAIN DESCRIPTION - ORIENTATION
ORIENTATION: LOWER
ORIENTATION: LOWER

## 2023-04-18 ASSESSMENT — PAIN DESCRIPTION - FREQUENCY: FREQUENCY: INTERMITTENT

## 2023-04-18 ASSESSMENT — PAIN DESCRIPTION - PAIN TYPE: TYPE: SURGICAL PAIN

## 2023-04-18 NOTE — PROGRESS NOTES
Shift assessment complete. Pt is ANOX4 on room air up with 1 assist. Shah in place draining. Lower abdomen/pannus incision is CDI with island dressing. Patient ambulated throughout the cool. No complaints at this time.      0600: pulled shah

## 2023-04-18 NOTE — PROGRESS NOTES
Patient is A&Ox4. Patient stated she had pain of 2. VS, assessment completed and required med's given per [facility]. Patient has been up ambulating in room. Island dressing is clean and intact. Patient denied any further needs and I continued to monitor. Call light is within reach and bed is in lowest position.  Electronically signed by Moriah Pascual RN on 4/18/2023 at 12:04 PM

## 2023-04-18 NOTE — PROGRESS NOTES
SUBJECTIVE:   40 y.o. [id number] here for post operative exam. Pt underwent JEN/bilateral salpingectomies . Pt denies any VB, pelvic pain or fever/chills. Pt states she is feeling well. Review of Systems:  General ROS: negative  Psychological ROS: negative  ENT ROS: negative  Endocrine ROS: negative  Respiratory ROS: no cough, shortness of breath, or wheezing  Cardiovascular ROS: no chest pain or dyspnea on exertion  Gastrointestinal ROS: no abdominal pain, change in bowel habits, or black or bloody stools  Genito-Urinary ROS: no dysuria, trouble voiding, or hematuria  Musculoskeletal ROS: negative  Neurological ROS: no TIA or stroke symptoms  Dermatological ROS: negative    OBJECTIVE:   Physical Exam:  GEN: She appears well, afebrile. HEENT: normal cephalic, atraumatic  CVS: regular rate and rhythm  ABDOMEN: benign, soft, nontender, no masses.  incision well healed  MUSCULOSKELETAL: normal gait, no masses  SKIN: normal texture and tone, no lesions  NEURO: normal tone, no hyperreflexia, 1+DTRs throughout    Pelvic Exam:   Buenrostro is out    ASSESSMENT:   POD#1  S/p TAHBS unilat oophorectomy    PLAN:   Continue current management

## 2023-04-18 NOTE — CARE COORDINATION
Case Management Assessment  Initial Evaluation    Date/Time of Evaluation: 4/18/2023 8:51 AM  Assessment Completed by: Mona Larson RN    If patient is discharged prior to next notation, then this note serves as note for discharge by case management. Patient Name: Hanna Colorado                   YOB: 1980  Diagnosis: Pelvic pain in female [R10.2]  Fibroids, intramural [D25.1]  S/P total abdominal hysterectomy [Z90.710]                   Date / Time: 4/17/2023  6:06 AM    Patient Admission Status: Inpatient   Readmission Risk (Low < 19, Mod (19-27), High > 27): Readmission Risk Score: 9.1    Current PCP: Jose A Clay  PCP verified by CM? Yes    Chart Reviewed: Yes      History Provided by: Patient  Patient Orientation: Alert and Oriented    Patient Cognition: Alert    Hospitalization in the last 30 days (Readmission):  No    If yes, Readmission Assessment in CM Navigator will be completed. Advance Directives:      Code Status: Prior   Patient's Primary Decision Maker is: Named in [address] [place] [address]      Discharge Planning:    Patient lives with: Children Type of Home: House  Primary Care Giver: Self  Patient Support Systems include: Children, Family Members, Friends/Neighbors   Current Financial resources: Other (Comment) (COMMERCIAL)  Current community resources: None  Current services prior to admission: None            Current DME:  NONE             Type of Home Care services:  None    ADLS  Prior functional level: Independent in ADLs/IADLs  Current functional level: Independent in ADLs/IADLs    PT AM-PAC:   /24  OT AM-PAC:   /24    Family can provide assistance at DC: Yes  Would you like Case Management to discuss the discharge plan with any other family members/significant others, and if so, who? No  Plans to Return to Present Housing: Yes  Other Identified Issues/Barriers to RETURNING to current housing: MEDICAL STABILITY. POD #1 TOTAL ABDOMINAL HYSTERECTOMY.    Potential

## 2023-04-19 VITALS
OXYGEN SATURATION: 99 % | DIASTOLIC BLOOD PRESSURE: 72 MMHG | HEART RATE: 71 BPM | TEMPERATURE: 99 F | SYSTOLIC BLOOD PRESSURE: 112 MMHG | RESPIRATION RATE: 18 BRPM

## 2023-04-19 PROCEDURE — 6370000000 HC RX 637 (ALT 250 FOR IP): Performed by: OBSTETRICS & GYNECOLOGY

## 2023-04-19 PROCEDURE — 2580000003 HC RX 258: Performed by: OBSTETRICS & GYNECOLOGY

## 2023-04-19 RX ADMIN — Medication 10 ML: at 08:05

## 2023-04-19 RX ADMIN — ONDANSETRON 4 MG: 4 TABLET, ORALLY DISINTEGRATING ORAL at 15:26

## 2023-04-19 RX ADMIN — LEVOTHYROXINE SODIUM 200 MCG: 0.1 TABLET ORAL at 06:10

## 2023-04-19 RX ADMIN — DOCUSATE SODIUM 100 MG: 100 CAPSULE, LIQUID FILLED ORAL at 07:43

## 2023-04-19 RX ADMIN — GABAPENTIN 200 MG: 100 CAPSULE ORAL at 07:43

## 2023-04-19 RX ADMIN — ESCITALOPRAM OXALATE 10 MG: 10 TABLET ORAL at 07:43

## 2023-04-19 RX ADMIN — PANTOPRAZOLE SODIUM 40 MG: 40 TABLET, DELAYED RELEASE ORAL at 06:11

## 2023-04-20 NOTE — DISCHARGE SUMMARY
SUBJECTIVE:  POD 2  DISCHARGE SUMMARY    OBJECTIVE: Feels well. She is passing flatus. She is not nauseated. Her pain is well controlled. SHE IS POD #2 s/p TAHBS unilat oophorectomy.  Will prepare for d/c with meds     VITALS:/72   Pulse 71   Temp 99 °F (37.2 °C) (Oral)   Resp 18   LMP 04/11/2023 (Exact Date)   SpO2 99%     Alert and oriented times 3  LUNGS cta no wheezes  HEART rrr w/o murmur  ABDOMEN:  normal bowel sounds, soft, non-distended  INCISION:healing well    DATA:    CBC:    Lab Results   Component Value Date    HGB 10.1 (L) 04/18/2023    HCT 31.5 (L) 04/18/2023         ASSESSMENT & PLAN:    Discharge home       Sonia murillo DO 4/20/2023 12:30 PM

## 2023-04-26 ENCOUNTER — OFFICE VISIT (OUTPATIENT)
Dept: OBGYN CLINIC | Age: 43
End: 2023-04-26

## 2023-04-26 VITALS
WEIGHT: 213 LBS | BODY MASS INDEX: 36.56 KG/M2 | SYSTOLIC BLOOD PRESSURE: 118 MMHG | DIASTOLIC BLOOD PRESSURE: 66 MMHG | HEART RATE: 66 BPM

## 2023-04-26 DIAGNOSIS — Z09 POSTOP CHECK: Primary | ICD-10-CM

## 2023-04-26 DIAGNOSIS — Z90.710 S/P TAH (TOTAL ABDOMINAL HYSTERECTOMY): ICD-10-CM

## 2023-04-26 PROCEDURE — 99024 POSTOP FOLLOW-UP VISIT: CPT | Performed by: OBSTETRICS & GYNECOLOGY

## 2023-04-26 NOTE — PROGRESS NOTES
SUBJECTIVE:   40 y.o. [id number] here for post operative exam. Pt underwent JEN/bilateral salpingectomies unilat oophorectomy . Pt denies any VB, pelvic pain or fever/chills. Pt states she is feeling well. Review of Systems:  General ROS: negative  Psychological ROS: negative  ENT ROS: negative  Endocrine ROS: negative  Respiratory ROS: no cough, shortness of breath, or wheezing  Cardiovascular ROS: no chest pain or dyspnea on exertion  Gastrointestinal ROS: no abdominal pain, change in bowel habits, or black or bloody stools  Genito-Urinary ROS: no dysuria, trouble voiding, or hematuria  Musculoskeletal ROS: negative  Neurological ROS: no TIA or stroke symptoms  Dermatological ROS: negative    OBJECTIVE:   Physical Exam:  GEN: She appears well, afebrile. HEENT: normal cephalic, atraumatic  CVS: regular rate and rhythm  ABDOMEN: benign, soft, nontender, no masses. incision well healed  MUSCULOSKELETAL: normal gait, no masses  SKIN: normal texture and tone, no lesions  NEURO: normal tone, no hyperreflexia, 1+DTRs throughout    Pelvic Exam:   deferred    ASSESSMENT:   Post operative wound check    PLAN:   Pathology reviewed - no malignancy noted  Post operative precautions reviewed. Pt to continue pelvic rest and lifting precautions. Follow up for repeat exam in 4wks.

## 2023-04-28 PROBLEM — F41.1 GENERALIZED ANXIETY DISORDER: Status: ACTIVE | Noted: 2022-11-30

## 2023-04-28 PROBLEM — R26.89 IMPAIRMENT OF BALANCE: Status: ACTIVE | Noted: 2023-04-28

## 2023-04-28 PROBLEM — M79.602: Status: ACTIVE | Noted: 2023-01-26

## 2023-04-28 PROBLEM — F32.A DEPRESSIVE DISORDER: Status: ACTIVE | Noted: 2023-04-28

## 2023-04-28 PROBLEM — F33.1 MAJOR DEPRESSIVE DISORDER, RECURRENT, MODERATE (HCC): Status: ACTIVE | Noted: 2022-11-30

## 2023-04-28 PROBLEM — H61.20 IMPACTED CERUMEN: Status: ACTIVE | Noted: 2023-04-28

## 2023-04-28 PROBLEM — F40.01 AGORAPHOBIA WITH PANIC DISORDER: Status: ACTIVE | Noted: 2022-12-22

## 2023-04-28 PROBLEM — F41.9 ANXIETY: Status: ACTIVE | Noted: 2023-01-30

## 2023-05-01 ENCOUNTER — OFFICE VISIT (OUTPATIENT)
Dept: ENDOCRINOLOGY | Age: 43
End: 2023-05-01
Payer: COMMERCIAL

## 2023-05-01 VITALS
DIASTOLIC BLOOD PRESSURE: 69 MMHG | OXYGEN SATURATION: 97 % | BODY MASS INDEX: 36.37 KG/M2 | SYSTOLIC BLOOD PRESSURE: 103 MMHG | HEART RATE: 61 BPM | WEIGHT: 213 LBS | HEIGHT: 64 IN

## 2023-05-01 DIAGNOSIS — E88.81 INSULIN RESISTANCE: ICD-10-CM

## 2023-05-01 DIAGNOSIS — E28.2 PCOS (POLYCYSTIC OVARIAN SYNDROME): ICD-10-CM

## 2023-05-01 DIAGNOSIS — E89.0 POSTOPERATIVE HYPOTHYROIDISM: Primary | ICD-10-CM

## 2023-05-01 PROCEDURE — G8427 DOCREV CUR MEDS BY ELIG CLIN: HCPCS | Performed by: INTERNAL MEDICINE

## 2023-05-01 PROCEDURE — 1036F TOBACCO NON-USER: CPT | Performed by: INTERNAL MEDICINE

## 2023-05-01 PROCEDURE — 99213 OFFICE O/P EST LOW 20 MIN: CPT | Performed by: INTERNAL MEDICINE

## 2023-05-01 PROCEDURE — 1111F DSCHRG MED/CURRENT MED MERGE: CPT | Performed by: INTERNAL MEDICINE

## 2023-05-01 PROCEDURE — G8417 CALC BMI ABV UP PARAM F/U: HCPCS | Performed by: INTERNAL MEDICINE

## 2023-05-01 RX ORDER — LEVOTHYROXINE SODIUM 0.03 MG/1
TABLET ORAL
Qty: 30 TABLET | Refills: 5 | Status: SHIPPED | OUTPATIENT
Start: 2023-05-01

## 2023-05-01 RX ORDER — LEVOTHYROXINE SODIUM 0.2 MG/1
TABLET ORAL
Qty: 30 TABLET | Refills: 3 | Status: SHIPPED | OUTPATIENT
Start: 2023-05-01

## 2023-05-01 NOTE — PROGRESS NOTES
5/1/2023    Assessment:       Diagnosis Orders   1. Postoperative hypothyroidism        2. Insulin resistance        3. PCOS (polycystic ovarian syndrome)              PLAN:     Orders Placed This Encounter   Procedures    Basic Metabolic Panel     Standing Status:   Future     Standing Expiration Date:   5/1/2024    TSH     Standing Status:   Future     Standing Expiration Date:   5/1/2024    T4, Free     Standing Status:   Future     Standing Expiration Date:   5/1/2024    Hemoglobin A1C     Standing Status:   Future     Standing Expiration Date:   5/1/2024     Orders Placed This Encounter   Medications    levothyroxine (SYNTHROID) 200 MCG tablet     Sig: Once a day total dose 225 mcg daily     Dispense:  30 tablet     Refill:  3    levothyroxine (SYNTHROID) 25 MCG tablet     Sig: TAKE 1 TABLET BY MOUTH DAILY. total dose of 225 mcg DAILY     Dispense:  30 tablet     Refill:  5    metFORMIN (GLUCOPHAGE) 500 MG tablet     Sig: Take 1 tablet by mouth 2 times daily (with meals)     Dispense:  180 tablet     Refill:  1     Continue current medication regimen for thyroid and metformin follow-up in 3 to 6 months        No orders of the defined types were placed in this encounter. No orders of the defined types were placed in this encounter. No follow-ups on file.   Subjective:     Chief Complaint   Patient presents with    Hypothyroidism     Postoperative    Other     PCOS  Insulin Resistance     Vitals:    05/01/23 0923   BP: 103/69   Site: Right Upper Arm   Position: Sitting   Cuff Size: Large Adult   Pulse: 61   SpO2: 97%   Weight: 213 lb (96.6 kg)   Height: 5' 4\" (1.626 m)     Wt Readings from Last 3 Encounters:   05/01/23 213 lb (96.6 kg)   04/26/23 213 lb (96.6 kg)   04/10/23 210 lb 6.4 oz (95.4 kg)     BP Readings from Last 3 Encounters:   05/01/23 103/69   04/26/23 118/66   04/19/23 112/72     Follow-up on type 2 diabetes obesity history of polycystic ovary syndrome patient on metformin and levothyroxine to

## 2023-05-24 ENCOUNTER — OFFICE VISIT (OUTPATIENT)
Dept: OBGYN CLINIC | Age: 43
End: 2023-05-24

## 2023-05-24 VITALS
BODY MASS INDEX: 36.7 KG/M2 | HEIGHT: 64 IN | WEIGHT: 215 LBS | DIASTOLIC BLOOD PRESSURE: 72 MMHG | SYSTOLIC BLOOD PRESSURE: 110 MMHG

## 2023-05-24 DIAGNOSIS — Z90.710 S/P TAH (TOTAL ABDOMINAL HYSTERECTOMY): ICD-10-CM

## 2023-05-24 DIAGNOSIS — Z09 POSTOPERATIVE EXAMINATION: Primary | ICD-10-CM

## 2023-05-24 NOTE — PROGRESS NOTES
SUBJECTIVE:   40 y.o. [id number] here for post operative exam. Pt underwent JEN/bilateral salpingectomies . Pt denies any VB, pelvic pain or fever/chills. Pt states she is feeling well. Review of Systems:  General ROS: negative  Psychological ROS: negative  ENT ROS: negative  Endocrine ROS: negative  Respiratory ROS: no cough, shortness of breath, or wheezing  Cardiovascular ROS: no chest pain or dyspnea on exertion  Gastrointestinal ROS: no abdominal pain, change in bowel habits, or black or bloody stools  Genito-Urinary ROS: no dysuria, trouble voiding, or hematuria  Musculoskeletal ROS: negative  Neurological ROS: no TIA or stroke symptoms  Dermatological ROS: negative    OBJECTIVE:   Physical Exam:  GEN: She appears well, afebrile. HEENT: normal cephalic, atraumatic  CVS: regular rate and rhythm  ABDOMEN: benign, soft, nontender, no masses. incisions well healed  MUSCULOSKELETAL: normal gait, no masses  SKIN: normal texture and tone, no lesions  NEURO: normal tone, no hyperreflexia, 1+DTRs throughout    Pelvic Exam:   EFG: normal external genitalia  URETHRA: normal appearing without diverticula or lesions  VULVA: normal appearing vulva with no masses, tenderness or lesions  VAGINA: normal rugae, no discharge, well healed cuff  ADNEXA: normal adnexa in size, nontender and no masses. PERINEUM: normal appearing without lesions or masses, well healed  ANUS: normal appearing without lesions or masses, no fissures or hemorrhoids    ASSESSMENT:   Post operative wound check    PLAN:   Pathology reviewed - no malignancy noted  Post operative precautions reviewed. Pt to continue pelvic rest and lifting precautions. Follow up for repeat exam in 4wks.

## 2023-05-31 ENCOUNTER — TELEPHONE (OUTPATIENT)
Dept: OBGYN CLINIC | Age: 43
End: 2023-05-31

## 2023-06-08 ENCOUNTER — TELEPHONE (OUTPATIENT)
Dept: OBGYN CLINIC | Age: 43
End: 2023-06-08

## 2023-08-03 RX ORDER — LEVOTHYROXINE SODIUM 0.2 MG/1
TABLET ORAL
Qty: 30 TABLET | Refills: 3 | Status: SHIPPED | OUTPATIENT
Start: 2023-08-03

## 2023-09-05 DIAGNOSIS — E89.0 POSTOPERATIVE HYPOTHYROIDISM: ICD-10-CM

## 2023-09-05 DIAGNOSIS — E28.2 PCOS (POLYCYSTIC OVARIAN SYNDROME): ICD-10-CM

## 2023-09-05 DIAGNOSIS — E88.819 INSULIN RESISTANCE: ICD-10-CM

## 2023-09-05 DIAGNOSIS — E89.0 POSTOPERATIVE HYPOTHYROIDISM: Primary | ICD-10-CM

## 2023-09-05 LAB
ANION GAP SERPL CALCULATED.3IONS-SCNC: 11 MEQ/L (ref 9–15)
BUN SERPL-MCNC: 8 MG/DL (ref 6–20)
CALCIUM SERPL-MCNC: 8.9 MG/DL (ref 8.5–9.9)
CHLORIDE SERPL-SCNC: 104 MEQ/L (ref 95–107)
CO2 SERPL-SCNC: 24 MEQ/L (ref 20–31)
CREAT SERPL-MCNC: 0.74 MG/DL (ref 0.5–0.9)
GLUCOSE SERPL-MCNC: 85 MG/DL (ref 70–99)
HBA1C MFR BLD: 5.6 % (ref 4.8–5.9)
POTASSIUM SERPL-SCNC: 3.9 MEQ/L (ref 3.4–4.9)
SODIUM SERPL-SCNC: 139 MEQ/L (ref 135–144)
T4 FREE SERPL-MCNC: 0.53 NG/DL (ref 0.84–1.68)
TSH SERPL-MCNC: 29.14 UIU/ML (ref 0.44–3.86)

## 2023-09-06 LAB — VITAMIN D 25-HYDROXY: 25.3 NG/ML

## 2023-09-07 ENCOUNTER — OFFICE VISIT (OUTPATIENT)
Dept: ENDOCRINOLOGY | Age: 43
End: 2023-09-07
Payer: COMMERCIAL

## 2023-09-07 VITALS
BODY MASS INDEX: 33.12 KG/M2 | SYSTOLIC BLOOD PRESSURE: 95 MMHG | HEIGHT: 64 IN | DIASTOLIC BLOOD PRESSURE: 69 MMHG | WEIGHT: 194 LBS | OXYGEN SATURATION: 97 % | HEART RATE: 63 BPM

## 2023-09-07 DIAGNOSIS — E28.2 PCOS (POLYCYSTIC OVARIAN SYNDROME): ICD-10-CM

## 2023-09-07 DIAGNOSIS — E89.0 POSTOPERATIVE HYPOTHYROIDISM: Primary | ICD-10-CM

## 2023-09-07 DIAGNOSIS — E88.81 INSULIN RESISTANCE: ICD-10-CM

## 2023-09-07 PROCEDURE — 99213 OFFICE O/P EST LOW 20 MIN: CPT | Performed by: INTERNAL MEDICINE

## 2023-09-07 PROCEDURE — 1036F TOBACCO NON-USER: CPT | Performed by: INTERNAL MEDICINE

## 2023-09-07 PROCEDURE — G8427 DOCREV CUR MEDS BY ELIG CLIN: HCPCS | Performed by: INTERNAL MEDICINE

## 2023-09-07 PROCEDURE — G8417 CALC BMI ABV UP PARAM F/U: HCPCS | Performed by: INTERNAL MEDICINE

## 2023-09-07 RX ORDER — SUCRALFATE ORAL 1 G/10ML
SUSPENSION ORAL
COMMUNITY
Start: 2023-08-10

## 2023-09-07 RX ORDER — ERGOCALCIFEROL 1.25 MG/1
CAPSULE ORAL
Qty: 5 CAPSULE | Refills: 3 | Status: SHIPPED | OUTPATIENT
Start: 2023-09-07

## 2023-09-07 RX ORDER — LEVOTHYROXINE SODIUM 0.05 MG/1
TABLET ORAL
Qty: 90 TABLET | Refills: 3 | Status: SHIPPED | OUTPATIENT
Start: 2023-09-07

## 2023-09-07 NOTE — PROGRESS NOTES
Housing in the Last Year: Not on file    Number of [address] in the Last Year: Not on file    Unstable Housing in the Last Year: No     Family History   Problem Relation Age of Onset    Colon Cancer Paternal Grandfather     Drug Abuse Father      No Known Allergies    Current Outpatient Medications:     sucralfate (CARAFATE) 1 GM/10ML suspension, TAKE 10 ML BY MOUTH FOUR TIMES DAILY on an empty stomach ONE HOUR BEFORE MEALS and AT BEDTIME, Disp: , Rfl:     levothyroxine (SYNTHROID) 200 MCG tablet, TAKE 1 TABLET BY MOUTH DAILY for a total dose OF 225mcg DAILY. , Disp: 30 tablet, Rfl: 3    levothyroxine (SYNTHROID) 25 MCG tablet, TAKE 1 TABLET BY MOUTH DAILY. total dose of 225 mcg DAILY, Disp: 30 tablet, Rfl: 5    ibuprofen (ADVIL;MOTRIN) 800 MG tablet, Take 1 tablet by mouth every 8 hours as needed for Pain, Disp: 90 tablet, Rfl: 1    docusate sodium (COLACE, DULCOLAX) 100 MG CAPS, Take 100 mg by mouth 2 times daily, Disp: 60 capsule, Rfl: 2    gabapentin (NEURONTIN) 100 MG capsule, Take 2 capsules by mouth twice daily for 90 days. , Disp: , Rfl:     scopolamine (TRANSDERM-SCOP) transdermal patch, APPLY ONE PATCH to the hairless area behind one EAR at least 4 HOURS before effect is required and reapply EVERY 3 DAYS AS NEEDED, Disp: , Rfl:     ondansetron (ZOFRAN-ODT) 8 MG TBDP disintegrating tablet, DISSOLVE 1 TABLET IN THE MOUTH EVERY 8 HOURS AS NEEDED FOR NAUSEA AND VOMITING., Disp: , Rfl:     hydrOXYzine HCl (ATARAX) 25 MG tablet, Take 1 tablet by mouth 3 times daily as needed, Disp: , Rfl:     escitalopram (LEXAPRO) 10 MG tablet, TAKE 1 TABLET BY MOUTH DAILY, Disp: , Rfl:     pantoprazole (PROTONIX) 40 MG tablet, Take 1 tablet by mouth 2 times daily (before meals), Disp: 180 tablet, Rfl: 1    meclizine (ANTIVERT) 25 MG tablet, TAKE 1 TABLET BY MOUTH THREE TIMES DAILY AS NEEDED, Disp: 90 tablet, Rfl: 2    vitamin D (ERGOCALCIFEROL) 1.25 MG (70855 UT) CAPS capsule, TAKE 1 CAPSULE BY MOUTH ONCE A WEEK FOR VITAMIN D

## 2023-09-15 ENCOUNTER — OFFICE VISIT (OUTPATIENT)
Dept: GASTROENTEROLOGY | Age: 43
End: 2023-09-15
Payer: COMMERCIAL

## 2023-09-15 VITALS
BODY MASS INDEX: 32.78 KG/M2 | SYSTOLIC BLOOD PRESSURE: 114 MMHG | WEIGHT: 192 LBS | DIASTOLIC BLOOD PRESSURE: 62 MMHG | HEART RATE: 63 BPM | OXYGEN SATURATION: 99 % | HEIGHT: 64 IN

## 2023-09-15 DIAGNOSIS — R11.0 NAUSEA: Primary | ICD-10-CM

## 2023-09-15 DIAGNOSIS — K59.00 CONSTIPATION, UNSPECIFIED CONSTIPATION TYPE: ICD-10-CM

## 2023-09-15 PROCEDURE — G8417 CALC BMI ABV UP PARAM F/U: HCPCS | Performed by: NURSE PRACTITIONER

## 2023-09-15 PROCEDURE — 1036F TOBACCO NON-USER: CPT | Performed by: NURSE PRACTITIONER

## 2023-09-15 PROCEDURE — G8427 DOCREV CUR MEDS BY ELIG CLIN: HCPCS | Performed by: NURSE PRACTITIONER

## 2023-09-15 PROCEDURE — 99213 OFFICE O/P EST LOW 20 MIN: CPT | Performed by: NURSE PRACTITIONER

## 2023-09-15 ASSESSMENT — ENCOUNTER SYMPTOMS
PHOTOPHOBIA: 0
NAUSEA: 1
ABDOMINAL DISTENTION: 0
SHORTNESS OF BREATH: 0
RECTAL PAIN: 0
TROUBLE SWALLOWING: 0
CONSTIPATION: 1
EYE PAIN: 0
CHEST TIGHTNESS: 0
DIARRHEA: 0
ABDOMINAL PAIN: 1
VOICE CHANGE: 0
WHEEZING: 0
EYE REDNESS: 0
COLOR CHANGE: 0
ANAL BLEEDING: 0
BLOOD IN STOOL: 0
VOMITING: 0

## 2023-09-15 NOTE — PROGRESS NOTES
stool.  -Start Miralax 17 g of powder dissolved in 8 oz of water once daily and titrate up or down (to a maximum of 34 g daily) to effect. 3. Associated medical conditions: Postoperative hypothyroidism, GERD, spondylosis, polycystic ovarian syndrome,? Acute transverse myelitis follows with neurology    Return in about 4 weeks (around 10/13/2023), or if symptoms worsen or fail to improve.       Pearl Whitman, APRN - CNP

## 2023-09-25 ENCOUNTER — OFFICE VISIT (OUTPATIENT)
Dept: FAMILY MEDICINE CLINIC | Age: 43
End: 2023-09-25
Payer: COMMERCIAL

## 2023-09-25 VITALS
TEMPERATURE: 97.2 F | SYSTOLIC BLOOD PRESSURE: 110 MMHG | HEART RATE: 74 BPM | HEIGHT: 64 IN | WEIGHT: 195 LBS | DIASTOLIC BLOOD PRESSURE: 72 MMHG | RESPIRATION RATE: 16 BRPM | BODY MASS INDEX: 33.29 KG/M2 | OXYGEN SATURATION: 100 %

## 2023-09-25 DIAGNOSIS — E28.2 PCOS (POLYCYSTIC OVARIAN SYNDROME): ICD-10-CM

## 2023-09-25 DIAGNOSIS — Z76.89 ENCOUNTER TO ESTABLISH CARE: ICD-10-CM

## 2023-09-25 DIAGNOSIS — F33.1 MAJOR DEPRESSIVE DISORDER, RECURRENT, MODERATE (HCC): ICD-10-CM

## 2023-09-25 DIAGNOSIS — G37.3 TRANSVERSE MYELITIS (HCC): ICD-10-CM

## 2023-09-25 DIAGNOSIS — E89.0 POSTOPERATIVE HYPOTHYROIDISM: ICD-10-CM

## 2023-09-25 DIAGNOSIS — Z00.00 WELL ADULT EXAM: Primary | ICD-10-CM

## 2023-09-25 PROCEDURE — 99396 PREV VISIT EST AGE 40-64: CPT | Performed by: PHYSICIAN ASSISTANT

## 2023-09-25 ASSESSMENT — PATIENT HEALTH QUESTIONNAIRE - PHQ9
6. FEELING BAD ABOUT YOURSELF - OR THAT YOU ARE A FAILURE OR HAVE LET YOURSELF OR YOUR FAMILY DOWN: 1
SUM OF ALL RESPONSES TO PHQ QUESTIONS 1-9: 7
2. FEELING DOWN, DEPRESSED OR HOPELESS: 1
3. TROUBLE FALLING OR STAYING ASLEEP: 1
SUM OF ALL RESPONSES TO PHQ QUESTIONS 1-9: 7
9. THOUGHTS THAT YOU WOULD BE BETTER OFF DEAD, OR OF HURTING YOURSELF: 0
7. TROUBLE CONCENTRATING ON THINGS, SUCH AS READING THE NEWSPAPER OR WATCHING TELEVISION: 1
SUM OF ALL RESPONSES TO PHQ QUESTIONS 1-9: 7
SUM OF ALL RESPONSES TO PHQ9 QUESTIONS 1 & 2: 2
1. LITTLE INTEREST OR PLEASURE IN DOING THINGS: 1
SUM OF ALL RESPONSES TO PHQ QUESTIONS 1-9: 7
10. IF YOU CHECKED OFF ANY PROBLEMS, HOW DIFFICULT HAVE THESE PROBLEMS MADE IT FOR YOU TO DO YOUR WORK, TAKE CARE OF THINGS AT HOME, OR GET ALONG WITH OTHER PEOPLE: 1
5. POOR APPETITE OR OVEREATING: 1
8. MOVING OR SPEAKING SO SLOWLY THAT OTHER PEOPLE COULD HAVE NOTICED. OR THE OPPOSITE, BEING SO FIGETY OR RESTLESS THAT YOU HAVE BEEN MOVING AROUND A LOT MORE THAN USUAL: 0
4. FEELING TIRED OR HAVING LITTLE ENERGY: 1

## 2023-09-25 NOTE — PROGRESS NOTES
Annia Grossman Dates 1980 is a 44 y.o. female who presents today with:  Chief Complaint   Patient presents with    [address] seen PCP at [facility] and dentistry; per pt has concerns but they are geared towards speciality        HPI  Hypothyroidism  - Patient Followed by Dr. Ariella Rahman thyroidectomy 11 years. - Currently on 250 mcg of levothyroxine.   - Last TSH on 29.140. Compliant with levothyroxine which is taken correctly. No diarrhea, constipation, palpitations, dry skin, depression, difficulty sleeping or fatigue. No weight gain or loss. Depression/Anxiety  - patient divulges that she is not regularly taking lexapro. She would take the medication for a short period of time and stops it. She does admit that she previously grew up with parent's with a drug addiction problem and the patient believes this could be a potentially reasoning why she is reluctant to be on long term medications. She understands that many of the medications she is on is not habit forming. She has not tried formal CBT or seen psychology in the past. This is something she is willing to try. - Patient's anxiety generally worsening her other chronic conditions. Patient notes that she would obesses over things to the point she does not feel well. She was told by her neurologist that she could be making herself sicker than she was by her anxiety. Patient does have insight into this being potentially the cause of her other more vague chronic conditions. - on Lexapro 5 mg. Has not been consistently taking the medication. PCOS/Hysterectomy  - patient has not been on OCPs. She had hysterectomy due to fibroids and excessive menstrual bleeding.   - Patient followed by gynecology, Dr. Brenda Yang. - last a1c 5.6  - Lipids not completed. Transverse Myelitis  - patient seeing Dr. Yang Ramsey.   - She is currently on gabapentin. No further recommendation advised for transverse myelitis.    - was worked

## 2023-09-26 ASSESSMENT — ENCOUNTER SYMPTOMS
NAUSEA: 1
COUGH: 0
SHORTNESS OF BREATH: 0
SINUS PAIN: 0
SINUS PRESSURE: 0
VOMITING: 0
CHEST TIGHTNESS: 0
DIARRHEA: 0
BACK PAIN: 0
ABDOMINAL PAIN: 1
CONSTIPATION: 1
SORE THROAT: 0

## 2023-09-26 ASSESSMENT — VISUAL ACUITY: OU: 1

## 2023-10-11 ENCOUNTER — OFFICE VISIT (OUTPATIENT)
Dept: FAMILY MEDICINE CLINIC | Age: 43
End: 2023-10-11
Payer: COMMERCIAL

## 2023-10-11 VITALS
OXYGEN SATURATION: 100 % | DIASTOLIC BLOOD PRESSURE: 82 MMHG | SYSTOLIC BLOOD PRESSURE: 132 MMHG | BODY MASS INDEX: 32.78 KG/M2 | HEIGHT: 64 IN | WEIGHT: 192 LBS | TEMPERATURE: 96.8 F | HEART RATE: 63 BPM

## 2023-10-11 DIAGNOSIS — J01.10 ACUTE NON-RECURRENT FRONTAL SINUSITIS: ICD-10-CM

## 2023-10-11 DIAGNOSIS — R42 DIZZINESS: ICD-10-CM

## 2023-10-11 DIAGNOSIS — F33.1 MAJOR DEPRESSIVE DISORDER, RECURRENT, MODERATE (HCC): ICD-10-CM

## 2023-10-11 DIAGNOSIS — E89.0 POSTOPERATIVE HYPOTHYROIDISM: Primary | ICD-10-CM

## 2023-10-11 DIAGNOSIS — E89.0 POSTOPERATIVE HYPOTHYROIDISM: ICD-10-CM

## 2023-10-11 LAB
ALBUMIN SERPL-MCNC: 4.4 G/DL (ref 3.5–4.6)
ALP SERPL-CCNC: 72 U/L (ref 40–130)
ALT SERPL-CCNC: 15 U/L (ref 0–33)
ANION GAP SERPL CALCULATED.3IONS-SCNC: 12 MEQ/L (ref 9–15)
AST SERPL-CCNC: 15 U/L (ref 0–35)
BILIRUB SERPL-MCNC: 0.4 MG/DL (ref 0.2–0.7)
BUN SERPL-MCNC: 9 MG/DL (ref 6–20)
CALCIUM SERPL-MCNC: 9.5 MG/DL (ref 8.5–9.9)
CHLORIDE SERPL-SCNC: 104 MEQ/L (ref 95–107)
CO2 SERPL-SCNC: 25 MEQ/L (ref 20–31)
CREAT SERPL-MCNC: 0.7 MG/DL (ref 0.5–0.9)
GLOBULIN SER CALC-MCNC: 3.3 G/DL (ref 2.3–3.5)
GLUCOSE SERPL-MCNC: 79 MG/DL (ref 70–99)
POTASSIUM SERPL-SCNC: 3.9 MEQ/L (ref 3.4–4.9)
PROT SERPL-MCNC: 7.7 G/DL (ref 6.3–8)
SODIUM SERPL-SCNC: 141 MEQ/L (ref 135–144)
T4 FREE SERPL-MCNC: 1.52 NG/DL (ref 0.84–1.68)
TSH SERPL-MCNC: 0.48 UIU/ML (ref 0.44–3.86)

## 2023-10-11 PROCEDURE — G8484 FLU IMMUNIZE NO ADMIN: HCPCS | Performed by: PHYSICIAN ASSISTANT

## 2023-10-11 PROCEDURE — 99214 OFFICE O/P EST MOD 30 MIN: CPT | Performed by: PHYSICIAN ASSISTANT

## 2023-10-11 PROCEDURE — G8427 DOCREV CUR MEDS BY ELIG CLIN: HCPCS | Performed by: PHYSICIAN ASSISTANT

## 2023-10-11 PROCEDURE — 1036F TOBACCO NON-USER: CPT | Performed by: PHYSICIAN ASSISTANT

## 2023-10-11 PROCEDURE — G8417 CALC BMI ABV UP PARAM F/U: HCPCS | Performed by: PHYSICIAN ASSISTANT

## 2023-10-11 RX ORDER — MECLIZINE HYDROCHLORIDE 25 MG/1
TABLET ORAL
Qty: 90 TABLET | Refills: 0 | Status: SHIPPED | OUTPATIENT
Start: 2023-10-11

## 2023-10-11 RX ORDER — METHYLPREDNISOLONE 4 MG/1
TABLET ORAL
Qty: 1 KIT | Refills: 0 | Status: SHIPPED | OUTPATIENT
Start: 2023-10-11 | End: 2023-10-17

## 2023-10-11 RX ORDER — VENLAFAXINE HYDROCHLORIDE 37.5 MG/1
37.5 CAPSULE, EXTENDED RELEASE ORAL DAILY
Qty: 30 CAPSULE | Refills: 3 | Status: SHIPPED | OUTPATIENT
Start: 2023-10-11

## 2023-10-11 ASSESSMENT — ENCOUNTER SYMPTOMS
VOMITING: 0
SWOLLEN GLANDS: 0
VISUAL CHANGE: 0
COUGH: 0
SORE THROAT: 0
CHANGE IN BOWEL HABIT: 0
NAUSEA: 0
ABDOMINAL PAIN: 0

## 2023-10-11 NOTE — PROGRESS NOTES
daily     Dispense:  30 capsule     Refill:  3     Medications Discontinued During This Encounter   Medication Reason    meclizine (ANTIVERT) 25 MG tablet REORDER    escitalopram (LEXAPRO) 10 MG tablet LIST CLEANUP     Return in about 6 weeks (around 11/22/2023) for Follow Up mood. Reviewed with the patient: current clinical status, medications, activities and diet. Side effects, adverse effects of the medication prescribed today, as well as treatment plan/ rationale and result expectations have been discussed with the patient who expresses understanding and desires to proceed. Close follow up to evaluate treatment results and for coordination of care. I have reviewed the patient's medical history in detail and updated the computerized patient record.     Nir Bahena, [address] [place] [address]

## 2023-10-12 ASSESSMENT — VISUAL ACUITY: OU: 1

## 2023-10-16 ENCOUNTER — PATIENT MESSAGE (OUTPATIENT)
Dept: FAMILY MEDICINE CLINIC | Age: 43
End: 2023-10-16

## 2023-10-16 ENCOUNTER — OFFICE VISIT (OUTPATIENT)
Dept: FAMILY MEDICINE CLINIC | Age: 43
End: 2023-10-16
Payer: COMMERCIAL

## 2023-10-16 VITALS
TEMPERATURE: 97.8 F | DIASTOLIC BLOOD PRESSURE: 100 MMHG | OXYGEN SATURATION: 100 % | HEART RATE: 70 BPM | HEIGHT: 64 IN | WEIGHT: 192 LBS | BODY MASS INDEX: 32.78 KG/M2 | SYSTOLIC BLOOD PRESSURE: 144 MMHG | RESPIRATION RATE: 18 BRPM

## 2023-10-16 DIAGNOSIS — T50.905A ADVERSE EFFECT OF DRUG, INITIAL ENCOUNTER: ICD-10-CM

## 2023-10-16 DIAGNOSIS — F33.1 MAJOR DEPRESSIVE DISORDER, RECURRENT, MODERATE (HCC): Primary | ICD-10-CM

## 2023-10-16 PROCEDURE — G8417 CALC BMI ABV UP PARAM F/U: HCPCS | Performed by: PHYSICIAN ASSISTANT

## 2023-10-16 PROCEDURE — G8427 DOCREV CUR MEDS BY ELIG CLIN: HCPCS | Performed by: PHYSICIAN ASSISTANT

## 2023-10-16 PROCEDURE — 1036F TOBACCO NON-USER: CPT | Performed by: PHYSICIAN ASSISTANT

## 2023-10-16 PROCEDURE — 99214 OFFICE O/P EST MOD 30 MIN: CPT | Performed by: PHYSICIAN ASSISTANT

## 2023-10-16 PROCEDURE — G8484 FLU IMMUNIZE NO ADMIN: HCPCS | Performed by: PHYSICIAN ASSISTANT

## 2023-10-16 RX ORDER — BUSPIRONE HYDROCHLORIDE 7.5 MG/1
7.5 TABLET ORAL 3 TIMES DAILY
Qty: 90 TABLET | Refills: 0 | Status: SHIPPED | OUTPATIENT
Start: 2023-10-16 | End: 2023-11-15

## 2023-10-16 ASSESSMENT — PATIENT HEALTH QUESTIONNAIRE - PHQ9
1. LITTLE INTEREST OR PLEASURE IN DOING THINGS: 3
2. FEELING DOWN, DEPRESSED OR HOPELESS: 3
7. TROUBLE CONCENTRATING ON THINGS, SUCH AS READING THE NEWSPAPER OR WATCHING TELEVISION: 3
3. TROUBLE FALLING OR STAYING ASLEEP: 3
4. FEELING TIRED OR HAVING LITTLE ENERGY: 3
8. MOVING OR SPEAKING SO SLOWLY THAT OTHER PEOPLE COULD HAVE NOTICED. OR THE OPPOSITE, BEING SO FIGETY OR RESTLESS THAT YOU HAVE BEEN MOVING AROUND A LOT MORE THAN USUAL: 0
10. IF YOU CHECKED OFF ANY PROBLEMS, HOW DIFFICULT HAVE THESE PROBLEMS MADE IT FOR YOU TO DO YOUR WORK, TAKE CARE OF THINGS AT HOME, OR GET ALONG WITH OTHER PEOPLE: 2
SUM OF ALL RESPONSES TO PHQ9 QUESTIONS 1 & 2: 6
SUM OF ALL RESPONSES TO PHQ QUESTIONS 1-9: 21
5. POOR APPETITE OR OVEREATING: 3
6. FEELING BAD ABOUT YOURSELF - OR THAT YOU ARE A FAILURE OR HAVE LET YOURSELF OR YOUR FAMILY DOWN: 3
SUM OF ALL RESPONSES TO PHQ QUESTIONS 1-9: 21
9. THOUGHTS THAT YOU WOULD BE BETTER OFF DEAD, OR OF HURTING YOURSELF: 0

## 2023-10-16 ASSESSMENT — ANXIETY QUESTIONNAIRES
GAD7 TOTAL SCORE: 21
7. FEELING AFRAID AS IF SOMETHING AWFUL MIGHT HAPPEN: 3
2. NOT BEING ABLE TO STOP OR CONTROL WORRYING: 3
IF YOU CHECKED OFF ANY PROBLEMS ON THIS QUESTIONNAIRE, HOW DIFFICULT HAVE THESE PROBLEMS MADE IT FOR YOU TO DO YOUR WORK, TAKE CARE OF THINGS AT HOME, OR GET ALONG WITH OTHER PEOPLE: VERY DIFFICULT
5. BEING SO RESTLESS THAT IT IS HARD TO SIT STILL: 3
1. FEELING NERVOUS, ANXIOUS, OR ON EDGE: 3
4. TROUBLE RELAXING: 3
3. WORRYING TOO MUCH ABOUT DIFFERENT THINGS: 3
6. BECOMING EASILY ANNOYED OR IRRITABLE: 3

## 2023-10-16 ASSESSMENT — ENCOUNTER SYMPTOMS
SHORTNESS OF BREATH: 0
DIARRHEA: 0
VOMITING: 0
SORE THROAT: 0
ABDOMINAL PAIN: 0
NAUSEA: 0
BACK PAIN: 0
CHEST TIGHTNESS: 0
SINUS PRESSURE: 0
SINUS PAIN: 0
COUGH: 0

## 2023-10-16 ASSESSMENT — COLUMBIA-SUICIDE SEVERITY RATING SCALE - C-SSRS
2. HAVE YOU ACTUALLY HAD ANY THOUGHTS OF KILLING YOURSELF?: NO
1. WITHIN THE PAST MONTH, HAVE YOU WISHED YOU WERE DEAD OR WISHED YOU COULD GO TO SLEEP AND NOT WAKE UP?: NO
6. HAVE YOU EVER DONE ANYTHING, STARTED TO DO ANYTHING, OR PREPARED TO DO ANYTHING TO END YOUR LIFE?: NO

## 2023-10-16 ASSESSMENT — VISUAL ACUITY: OU: 1

## 2023-10-16 NOTE — TELEPHONE ENCOUNTER
From: Lore Mann  To: Tanesha Bryan  Sent: 10/16/2023 6:36 AM EDT  Subject: Effexor    This medication is giving ne alot of side effects. I can't sleep, nausea is really bad. 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 I also feel quite a bit more anxious since taking it. I know my body has to adjust to the medication. Can I have a couple weeks off work to get it in my system because I don't want to get fired trying to make myself feel better?

## 2023-10-16 NOTE — PROGRESS NOTES
less than 2 seconds. Neurological:      Mental Status: She is alert and oriented to person, place, and time. Gait: Gait is intact. Psychiatric:         Attention and Perception: Attention normal.         Mood and Affect: Mood normal. Affect is tearful. Speech: Speech normal.         Behavior: Behavior normal. Behavior is cooperative. Thought Content: Thought content normal.         Judgment: Judgment normal.       Assessment & Plan   1. Major depressive disorder, recurrent, moderate ([address])  -     Ambulatory referral to [address] [place] [address]  -     busPIRone (BUSPAR) 7.5 MG tablet; Take 1 tablet by mouth 3 times daily, Disp-90 tablet, R-0Normal  2. Adverse effect of drug, initial encounter     Discussed [facility]. - patient having adverse drug reaction. Will stop medication. Start buspar. Continue as prescribed. Will fill short term disability. Will have next appointment with psychiatry on 10/26/23  Orders Placed This Encounter   Procedures    Ambulatory referral to [address] [place] [address]     Referral Priority:   Routine     Referral Type:   Eval and Treat     Referral Reason:   Specialty Services Required     Referred to Provider:   OLIVIA Amato - CNP     Requested Specialty:   Psychiatry     Number of Visits Requested:   1     Orders Placed This Encounter   Medications    busPIRone (BUSPAR) 7.5 MG tablet     Sig: Take 1 tablet by mouth 3 times daily     Dispense:  90 tablet     Refill:  0     Medications Discontinued During This Encounter   Medication Reason    venlafaxine (EFFEXOR XR) 37.5 MG extended release capsule LIST CLEANUP     No follow-ups on file. Reviewed with the patient: current clinical status, medications, activities and diet. Side effects, adverse effects of the medication prescribed today, as well as treatment plan/ rationale and result expectations have been discussed with the patient who expresses understanding and desires to proceed.     Close follow up to

## 2023-10-19 ENCOUNTER — DOCUMENTATION (OUTPATIENT)
Dept: BEHAVIORAL HEALTH | Facility: CLINIC | Age: 43
End: 2023-10-19
Payer: COMMERCIAL

## 2023-10-19 ENCOUNTER — APPOINTMENT (OUTPATIENT)
Dept: BEHAVIORAL HEALTH | Facility: CLINIC | Age: 43
End: 2023-10-19
Payer: COMMERCIAL

## 2023-10-22 PROBLEM — K62.5 BLOOD PER RECTUM: Status: ACTIVE | Noted: 2022-12-30

## 2023-10-22 PROBLEM — E28.2 PCOS (POLYCYSTIC OVARIAN SYNDROME): Status: ACTIVE | Noted: 2020-03-07

## 2023-10-22 PROBLEM — K63.5 POLYP OF COLON: Status: ACTIVE | Noted: 2022-12-30

## 2023-10-22 PROBLEM — G43.109 MIGRAINE WITH AURA AND WITHOUT STATUS MIGRAINOSUS, NOT INTRACTABLE: Status: ACTIVE | Noted: 2021-11-01

## 2023-10-22 PROBLEM — R26.89 IMPAIRMENT OF BALANCE: Status: ACTIVE | Noted: 2023-04-28

## 2023-10-22 PROBLEM — Z90.710 S/P TOTAL ABDOMINAL HYSTERECTOMY: Status: ACTIVE | Noted: 2023-04-17

## 2023-10-22 PROBLEM — R94.31 ABNORMAL ELECTROCARDIOGRAPHY: Status: ACTIVE | Noted: 2022-01-04

## 2023-10-22 PROBLEM — F33.1 MAJOR DEPRESSIVE DISORDER, RECURRENT, MODERATE (MULTI): Status: ACTIVE | Noted: 2022-11-30

## 2023-10-22 PROBLEM — Z20.822 CONTACT WITH AND (SUSPECTED) EXPOSURE TO COVID-19: Status: ACTIVE | Noted: 2021-03-01

## 2023-10-22 PROBLEM — R26.0 ATAXIC GAIT: Status: ACTIVE | Noted: 2021-11-01

## 2023-10-22 PROBLEM — G37.3 TRANSVERSE MYELITIS (MULTI): Status: ACTIVE | Noted: 2022-02-15

## 2023-10-22 PROBLEM — L02.01 CUTANEOUS ABSCESS OF FACE: Status: ACTIVE | Noted: 2018-09-05

## 2023-10-22 PROBLEM — R42 LIGHTHEADED: Status: ACTIVE | Noted: 2021-11-01

## 2023-10-22 PROBLEM — R79.89 LOW VITAMIN D LEVEL: Status: ACTIVE | Noted: 2020-03-07

## 2023-10-22 PROBLEM — Q76.2 CONGENITAL SPONDYLOLYSIS, LUMBOSACRAL REGION: Status: ACTIVE | Noted: 2017-09-14

## 2023-10-22 PROBLEM — I49.1 PREMATURE ATRIAL CONTRACTION: Status: ACTIVE | Noted: 2022-01-04

## 2023-10-22 PROBLEM — E66.3 OVERWEIGHT: Status: ACTIVE | Noted: 2022-01-04

## 2023-10-22 PROBLEM — M54.10 RADICULAR SYNDROME OF RIGHT LEG: Status: ACTIVE | Noted: 2017-09-14

## 2023-10-22 PROBLEM — D25.1 FIBROIDS, INTRAMURAL: Status: ACTIVE | Noted: 2023-03-09

## 2023-10-22 PROBLEM — F40.01 AGORAPHOBIA WITH PANIC DISORDER: Status: ACTIVE | Noted: 2022-12-22

## 2023-10-22 PROBLEM — M48.00 MULTILEVEL NEURAL FORAMINAL STENOSIS: Status: ACTIVE | Noted: 2017-10-30

## 2023-10-22 PROBLEM — E66.01 MORBID OBESITY DUE TO EXCESS CALORIES (MULTI): Status: ACTIVE | Noted: 2017-10-10

## 2023-10-22 PROBLEM — L83 ACANTHOSIS NIGRICANS: Status: ACTIVE | Noted: 2020-03-07

## 2023-10-22 PROBLEM — G47.30 SLEEP APNEA: Status: ACTIVE | Noted: 2023-10-22

## 2023-10-22 PROBLEM — F32.A DEPRESSIVE DISORDER: Status: ACTIVE | Noted: 2023-04-28

## 2023-10-22 PROBLEM — F41.1 GENERALIZED ANXIETY DISORDER: Status: ACTIVE | Noted: 2022-11-30

## 2023-10-22 PROBLEM — K29.70 GASTRITIS WITHOUT BLEEDING: Status: ACTIVE | Noted: 2022-12-30

## 2023-10-22 PROBLEM — M79.602: Status: ACTIVE | Noted: 2023-01-26

## 2023-10-22 PROBLEM — H61.20 IMPACTED CERUMEN: Status: ACTIVE | Noted: 2023-04-28

## 2023-10-22 PROBLEM — R06.83 SNORING: Status: ACTIVE | Noted: 2022-01-04

## 2023-10-22 PROBLEM — R93.89 ABNORMAL MRI: Status: ACTIVE | Noted: 2022-06-14

## 2023-10-22 PROBLEM — F41.9 ANXIETY: Status: ACTIVE | Noted: 2023-01-30

## 2023-10-22 PROBLEM — R00.2 PALPITATIONS: Status: ACTIVE | Noted: 2022-01-04

## 2023-10-22 PROBLEM — E03.9 HYPOTHYROIDISM: Status: ACTIVE | Noted: 2017-09-13

## 2023-10-22 RX ORDER — METFORMIN HYDROCHLORIDE 500 MG/1
1 TABLET ORAL
COMMUNITY
Start: 2021-08-18

## 2023-10-22 RX ORDER — METHYLPREDNISOLONE 4 MG/1
TABLET ORAL
COMMUNITY
Start: 2023-10-11

## 2023-10-22 RX ORDER — BUSPIRONE HYDROCHLORIDE 5 MG/1
1 TABLET ORAL 3 TIMES DAILY PRN
COMMUNITY
Start: 2021-08-13 | End: 2023-11-28 | Stop reason: WASHOUT

## 2023-10-22 RX ORDER — DOCUSATE SODIUM 100 MG/1
100 CAPSULE, LIQUID FILLED ORAL 2 TIMES DAILY
COMMUNITY
Start: 2023-04-18

## 2023-10-22 RX ORDER — FLUOXETINE HYDROCHLORIDE 20 MG/1
20 CAPSULE ORAL DAILY
COMMUNITY
Start: 2022-12-09 | End: 2023-10-24 | Stop reason: WASHOUT

## 2023-10-22 RX ORDER — ONDANSETRON 4 MG/1
4 TABLET, ORALLY DISINTEGRATING ORAL AS NEEDED
COMMUNITY
Start: 2023-10-09 | End: 2023-10-24 | Stop reason: WASHOUT

## 2023-10-22 RX ORDER — AMOXICILLIN AND CLAVULANATE POTASSIUM 875; 125 MG/1; MG/1
1 TABLET, FILM COATED ORAL EVERY 12 HOURS
COMMUNITY
Start: 2022-11-23

## 2023-10-22 RX ORDER — CYANOCOBALAMIN 1000 UG/ML
1000 INJECTION, SOLUTION INTRAMUSCULAR; SUBCUTANEOUS
COMMUNITY
Start: 2023-05-05

## 2023-10-22 RX ORDER — ONDANSETRON HYDROCHLORIDE 8 MG/1
8 TABLET, FILM COATED ORAL EVERY 8 HOURS PRN
COMMUNITY
Start: 2020-12-18

## 2023-10-22 RX ORDER — LEVOTHYROXINE SODIUM 50 UG/1
50 TABLET ORAL DAILY
COMMUNITY
Start: 2023-09-07

## 2023-10-22 RX ORDER — METFORMIN HYDROCHLORIDE 500 MG/1
500 TABLET ORAL
COMMUNITY
Start: 2021-08-18

## 2023-10-22 RX ORDER — SUCRALFATE 1 G/10ML
1 SUSPENSION ORAL 4 TIMES DAILY
COMMUNITY
Start: 2023-08-10

## 2023-10-22 RX ORDER — PANTOPRAZOLE SODIUM 40 MG/1
40 TABLET, DELAYED RELEASE ORAL 2 TIMES DAILY
COMMUNITY
Start: 2022-12-30

## 2023-10-22 RX ORDER — MECLIZINE HYDROCHLORIDE 25 MG/1
1 TABLET ORAL 3 TIMES DAILY PRN
COMMUNITY
Start: 2022-02-15

## 2023-10-22 RX ORDER — GABAPENTIN 100 MG/1
200 CAPSULE ORAL 2 TIMES DAILY
COMMUNITY
Start: 2023-03-23

## 2023-10-22 RX ORDER — IBUPROFEN 800 MG/1
800 TABLET ORAL EVERY 8 HOURS PRN
COMMUNITY
Start: 2023-09-13

## 2023-10-22 RX ORDER — LEVOTHYROXINE SODIUM 200 UG/1
200 TABLET ORAL DAILY
COMMUNITY
Start: 2021-08-13

## 2023-10-22 RX ORDER — SCOLOPAMINE TRANSDERMAL SYSTEM 1 MG/1
1 PATCH, EXTENDED RELEASE TRANSDERMAL
COMMUNITY
Start: 2023-03-21

## 2023-10-22 RX ORDER — CLINDAMYCIN PHOSPHATE 10 UG/ML
LOTION TOPICAL
COMMUNITY

## 2023-10-22 RX ORDER — ESCITALOPRAM OXALATE 10 MG/1
10 TABLET ORAL DAILY
COMMUNITY
Start: 2023-02-22 | End: 2023-10-24 | Stop reason: WASHOUT

## 2023-10-22 RX ORDER — OSELTAMIVIR PHOSPHATE 75 MG/1
75 CAPSULE ORAL 2 TIMES DAILY
COMMUNITY
Start: 2022-11-10

## 2023-10-22 RX ORDER — ARIPIPRAZOLE 5 MG/1
1 TABLET ORAL DAILY
COMMUNITY
Start: 2021-09-17 | End: 2023-10-24 | Stop reason: WASHOUT

## 2023-10-22 RX ORDER — MELOXICAM 15 MG/1
15 TABLET ORAL
COMMUNITY
Start: 2020-05-14

## 2023-10-22 RX ORDER — CEPHALEXIN 500 MG/1
500 CAPSULE ORAL EVERY 12 HOURS
COMMUNITY
Start: 2023-05-05 | End: 2023-10-24 | Stop reason: WASHOUT

## 2023-10-22 RX ORDER — VENLAFAXINE HYDROCHLORIDE 37.5 MG/1
37.5 CAPSULE, EXTENDED RELEASE ORAL DAILY
COMMUNITY
Start: 2023-10-11 | End: 2023-10-24 | Stop reason: WASHOUT

## 2023-10-22 RX ORDER — TRETINOIN AND BENZOYL PEROXIDE 30; 1 MG/G; MG/G
CREAM TOPICAL NIGHTLY
COMMUNITY

## 2023-10-22 RX ORDER — BUSPIRONE HYDROCHLORIDE 7.5 MG/1
1 TABLET ORAL 3 TIMES DAILY
COMMUNITY
Start: 2023-10-16 | End: 2023-11-28 | Stop reason: WASHOUT

## 2023-10-22 RX ORDER — SPIRONOLACTONE 50 MG/1
1 TABLET, FILM COATED ORAL 2 TIMES DAILY
COMMUNITY
Start: 2021-08-18

## 2023-10-22 RX ORDER — LEVOTHYROXINE SODIUM 25 UG/1
TABLET ORAL
COMMUNITY
Start: 2021-08-13 | End: 2023-10-24 | Stop reason: WASHOUT

## 2023-10-22 RX ORDER — HYDROXYZINE HYDROCHLORIDE 25 MG/1
1 TABLET, FILM COATED ORAL 3 TIMES DAILY PRN
COMMUNITY
Start: 2022-06-14

## 2023-10-22 RX ORDER — ONDANSETRON 8 MG/1
8 TABLET, ORALLY DISINTEGRATING ORAL EVERY 8 HOURS PRN
COMMUNITY
Start: 2023-03-28

## 2023-10-22 RX ORDER — TRAZODONE HYDROCHLORIDE 50 MG/1
25 TABLET ORAL NIGHTLY
COMMUNITY
Start: 2021-08-13

## 2023-10-22 RX ORDER — IBUPROFEN 400 MG/1
1 TABLET ORAL 3 TIMES DAILY PRN
COMMUNITY
Start: 2021-09-01

## 2023-10-22 RX ORDER — ERGOCALCIFEROL 1.25 MG/1
50000 CAPSULE ORAL
COMMUNITY
Start: 2021-07-23

## 2023-10-22 RX ORDER — MUPIROCIN 20 MG/G
OINTMENT TOPICAL 2 TIMES DAILY
COMMUNITY
Start: 2023-05-05

## 2023-10-22 RX ORDER — PANTOPRAZOLE SODIUM 20 MG/1
20 TABLET, DELAYED RELEASE ORAL 2 TIMES DAILY
COMMUNITY
Start: 2021-08-13 | End: 2023-10-24 | Stop reason: WASHOUT

## 2023-10-22 NOTE — PROGRESS NOTES
Mood Disorders Intensive Outpatient Program   Intake Assessment     Time: 3:00 pm.             End: 4:00 pm.               Name: Allison Sky        : 1980 (43)     DEMOGRAPHICS  Gender: Female  Pronouns: She, her        Sexual Orientation: Heterosexual  Race: -American  Ethnicity: Non-            Gnosticism/Spiritual Orientation: Yazdanism  Primary Language: English    CHIEF COMPLAINT    CHIEF COMPLAINT SUMMARY:  Allison Sky (Tasha) is a 43-year-old female (pronouns: she/her/hers) currently residing in the [place] area with her boyfriend and three sons. Pt. was self-referred for depression and anxiety with panic. Pt. reported present stressors relating to work, family/home life, and difficulty managing and coping with symptoms. Pt. reported present depressive symptoms including: decreased interest in pleasure, appetite and sleep disturbance, feelings of hopelessness, and anxiety occurring more days than not. Pt. reported significant loss over the past five years. Pt. was discharged from [facility] in , but reported not following up with recommendations for medication management or therapy post discharge. Pt. reported having a hysterectomy in , which she believes also to have negatively impacted her mood and mental health recovery. Pt. reported recently getting back on all medications, which was recommended by her PCP. Pt. presently works full time at [employer], but is taking a medical leave while enrolled in [facility]. Pt. reported significant trauma hx, which was assessed during her first [facility] intake assessment. Pt. identified goals for IOP including: managing and coping with sx, increasing illness/symptom insight, increasing self-esteem, and gaining social support. Pt. denied SI/HI and substance use. Pt. will meet with Keaton Mckay on Tuesday, 10/24/23, and tentatively begin IOP on Wednesday, 10/25/23.     Accompanied to appointment by:     Alone     Pt. has given written permission  to speak to the following regarding treatment in the IOP program:  Name: Maritza   Relationship: Aunt   Phone #: [phone]    Information in this assessment was obtained from the patient only: Yes     Current Providers:    Psychiatrist:  Pt. denied   Therapist:  Pt. denied   How long have you been with these providers?    Past Psych Hospitalizations (where, when and why):  [facility] - 2011 - was there for 5 days - went to the ER for anxiety and migraines     Past IOP/PHP Programs:  Pt. denied     Suicidal ideation:  Pt. denied     Homicidal Ideation:  Pt. denied     Current self-harming behavior:   Pt. denied     Psychosocial Histories:    PAST MEDICAL HISTORY:  Name of PCP:  [facility]     Falls Risk Assessment: None; pt. denied.     Medical Conditions:    Degenerative discs  Thyroid Removal   Acute Transverse Mialitis     Hx of Surgeries?  Thyroid removal    Adaptive equipment used:   Pt. denied     Allergies:    Pt. denied      Current Medications:   Zofran 8 mg  Levothyroxine 250mcg  Pantoprazole 40mg  Vitamin D 50,000 IUs  Meclizine 25 mg  Gabapentin 300 mg    Present Living Situation: Pt. presently lives in a house with her boyfriend and three sons.      Do you feel safe at home? Yes    Relationships  Any current partnerships or relationships? I've been with my boyfriend for 9 years.   Partner's name: Dustin  Partner's occupation: [employer]      Tell me about your history of personal relationships. Any supportive groups, organizations or communities that you are a part of?   Denied      WORK HISTORY  Pt. presently works at [employer] as an . Pt. has worked at [employer] full-time for 8 years. Pt. is currently on a medical leave from her job.     FMLA status:   Are you currently on FMLA? X   Are you planning on being on FMLA? Yes   When did your FMLA begin: 10/19/2023  Who is the provider who submitted FMLA: PCP      RISK BEHAVIOR HISTORY  Past & Present Substance Hx:  Caffeine: Pt.  denied   Nicotine: Pt. denied   Alcohol (type): Pt. denied   Marijuana: Pt. denied   Marlene/Ecstacy: Pt. denied   Heroin: Pt. denied   Opiates/Pain pills: Pt. denied   Hallucinogens (LSD, mushrooms, synthetic): Pt. denied   Stimulants/Cocaine: Pt. denied   Over-the-counter or prescription meds (benzos): Pt. denied      Have you had any treatment for chemical dependency?  Denied    GUNS/FIREARMS  Do you own guns or have access to firearms? Denied   What are your safety practices with your firearm(s)? Denied     LEGAL HISTORY: *TAKEN FROM PT.'S FIRST IOP INTAKE ASSESSMENT*    Have you been arrested or convicted of a crime: One time  If yes please explain: 20 years ago, contempt of court, disturbing the peace because my music was too loud in my apartment.   Has your 's license ever been suspended?   One time for not having insurance  If yes, how many times? Once in 2008    SCORES AND SCALES:  RACHELE-21: 43  NATY: 39  BDI: 42  PHQ-9: 17  MAST-DAST: 0/0  MDQ: 6/positive     How hard are you willing to work to get better from 0 to 10: 10    What barriers do you see interfering with your ability to attend IOP:   Pt. denied     Goals patient wants to work on while attending IOP:  See chief complaint summary      SYMPTOMS    Depressive symptoms reported:  Five (or more) of the following symptoms have been present during the same 2-week period:  Depressed for most of the day/ nearly every day   Depression lasts how long?    Decreased interest in pleasure or interest in all, or almost all, activities most of the day, nearly everyday   Appetite Disturbance (Weight Loss or Weight Gain)  Sleep Disturbance  Trouble falling asleep  Trouble staying asleep  Sleeping a lot during the day  Psychomotor retardation (feel like you're moving slow)   Psychomotor agitation (have to be moving all the time)  Fatigue or loss of energy nearly every day  Feelings of worthlessness  Feelings of hopelessness    Feelings of guilt      Diminished  ability to think or concentrate   Difficulty making decisions   Decreased self-esteem  Indecisiveness  Pessimistic & negative attitude  Crying spells  Withdrawn or Social Isolating behavior     Anxiety Symptoms:  Anxiety  Anxiety occurring more days than not  Difficulty to control worry  Feeling restless or on edge   Easily fatigued  Difficulty concentrating or mind going blank  Irritability  Muscle tension  Sleep disturbance (difficulty falling asleep/staying asleep, restless sleep)     MSE  General appearance & grooming: Appropriate      Motor activity:  Appropriate       Behavior: Cooperative       Adaptive Equipment:   Speech: Appropriate       Mood: Depressed/Flat       Affect: Mood Congruent    Thought process:  Appropriate       Thought content: Appropriate     Cognition: No impairment, Orientation: Alert & Oriented x 3  Insight:  Aware of problem       Eye contact: Average       Judgment: Judgment intact       Interview behavior: Appropriate       Hallucinations: None       Delusions: Absent     Grandeur     Reference     Influence     Persecution     Other        PLAN:  Pt. presents with signs and symptoms of bipolar disorder, depressed episode and anxiety.  Pt. was educated about the IOP program and enrollment was recommended. Pt. is willing to attend IOP. Pt. denies SI/HI at this time, not judged to be a risk to self or others. Pt. insurance information has been verified. Pt. will meet with Keaton CONTRERAS on 10/24/23 and tentatively begin IOP on 10/25/23.     Erin Al, AGUSTIN, ATR

## 2023-10-24 ENCOUNTER — TELEMEDICINE (OUTPATIENT)
Dept: BEHAVIORAL HEALTH | Facility: CLINIC | Age: 43
End: 2023-10-24
Payer: COMMERCIAL

## 2023-10-24 DIAGNOSIS — F41.1 GAD (GENERALIZED ANXIETY DISORDER): Primary | ICD-10-CM

## 2023-10-24 DIAGNOSIS — G37.3 TRANSVERSE MYELITIS (MULTI): ICD-10-CM

## 2023-10-24 DIAGNOSIS — F42.4 EXCORIATION (SKIN-PICKING) DISORDER: ICD-10-CM

## 2023-10-24 DIAGNOSIS — F33.1 MODERATE EPISODE OF RECURRENT MAJOR DEPRESSIVE DISORDER (MULTI): ICD-10-CM

## 2023-10-24 PROCEDURE — 99214 OFFICE O/P EST MOD 30 MIN: CPT | Performed by: NURSE PRACTITIONER

## 2023-10-24 ASSESSMENT — ENCOUNTER SYMPTOMS: UNEXPECTED WEIGHT CHANGE: 1
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
## 2023-10-25 ENCOUNTER — OFFICE VISIT (OUTPATIENT)
Dept: GASTROENTEROLOGY | Age: 43
End: 2023-10-25
Payer: COMMERCIAL

## 2023-10-25 VITALS
HEART RATE: 65 BPM | BODY MASS INDEX: 33.3 KG/M2 | WEIGHT: 194 LBS | OXYGEN SATURATION: 97 % | SYSTOLIC BLOOD PRESSURE: 112 MMHG | DIASTOLIC BLOOD PRESSURE: 70 MMHG

## 2023-10-25 DIAGNOSIS — K58.1 IRRITABLE BOWEL SYNDROME WITH CONSTIPATION: Primary | ICD-10-CM

## 2023-10-25 PROCEDURE — G8417 CALC BMI ABV UP PARAM F/U: HCPCS | Performed by: NURSE PRACTITIONER

## 2023-10-25 PROCEDURE — 99213 OFFICE O/P EST LOW 20 MIN: CPT | Performed by: NURSE PRACTITIONER

## 2023-10-25 PROCEDURE — G8427 DOCREV CUR MEDS BY ELIG CLIN: HCPCS | Performed by: NURSE PRACTITIONER

## 2023-10-25 PROCEDURE — G8484 FLU IMMUNIZE NO ADMIN: HCPCS | Performed by: NURSE PRACTITIONER

## 2023-10-25 PROCEDURE — 1036F TOBACCO NON-USER: CPT | Performed by: NURSE PRACTITIONER

## 2023-10-25 ASSESSMENT — ENCOUNTER SYMPTOMS
DIARRHEA: 0
VOICE CHANGE: 0
ABDOMINAL DISTENTION: 1
CONSTIPATION: 1
NAUSEA: 0
VOMITING: 0
WHEEZING: 0
ABDOMINAL PAIN: 0
TROUBLE SWALLOWING: 0
BLOOD IN STOOL: 0
CHEST TIGHTNESS: 0
EYE REDNESS: 0
PHOTOPHOBIA: 0
SHORTNESS OF BREATH: 0
EYE PAIN: 0
ANAL BLEEDING: 0
COLOR CHANGE: 0
RECTAL PAIN: 0

## 2023-10-30 ENCOUNTER — CLINICAL SUPPORT (OUTPATIENT)
Dept: BEHAVIORAL HEALTH | Facility: CLINIC | Age: 43
End: 2023-10-30
Payer: COMMERCIAL

## 2023-10-30 DIAGNOSIS — F41.1 GAD (GENERALIZED ANXIETY DISORDER): ICD-10-CM

## 2023-10-30 DIAGNOSIS — F33.1 MODERATE EPISODE OF RECURRENT MAJOR DEPRESSIVE DISORDER (MULTI): ICD-10-CM

## 2023-10-30 PROCEDURE — 90853 GROUP PSYCHOTHERAPY: CPT | Mod: U2,95

## 2023-10-30 NOTE — GROUP NOTE
Group Topic: Goals   Group Date: 10/30/2023  Start Time:  9:00 AM  End Time: 10:00 AM  Facilitators: AGUSTIN Garza; AGUSTIN Hoover   [facility]    Number of Participants: 8   Group Focus: activities of daily living skills, check in, coping skills, and daily focus  Treatment Modality: Behavior Modification Therapy and Cognitive Behavioral Therapy  Interventions utilized were assignment, leisure development, and problem solving  Purpose: coping skills, insight or knowledge, and self-care    Name: Allison Sky YOB: 1980   MR: [id number]      This was a video IOP group on a HIPAA compliant platform. All patients were provided with informed consent.   Date: 10/30/23 Time: 9-10a, Number of Patients: 8 , User Name: [email or web address], Number of Staff: 2    Group topic(s): SMART goal weekend review    Group members introduced themselves to a new member and participated in an icebreaker activity. Group members then took turns sharing progress and challenges they faced this weekend related to their identified SMART goals.     Grisel was present on camera and appeared attentive. Today is her first day in IOP. She was attentive as others shared their weekend SMART goals. She shared she loved going to parking lots and doing donuts in her car in the snow. She wanted to do this with her teenage sons but they have little interest in doing this with her.       Kenya Spring, AGUSTIN-S, [facility]  Secondary Facilitator Michele Yoder, [place]  Supervisor Armida Roy, WILMARC-S, ATR

## 2023-10-31 ENCOUNTER — CLINICAL SUPPORT (OUTPATIENT)
Dept: BEHAVIORAL HEALTH | Facility: CLINIC | Age: 43
End: 2023-10-31
Payer: COMMERCIAL

## 2023-10-31 DIAGNOSIS — F41.1 GAD (GENERALIZED ANXIETY DISORDER): ICD-10-CM

## 2023-10-31 DIAGNOSIS — F33.1 MODERATE EPISODE OF RECURRENT MAJOR DEPRESSIVE DISORDER (MULTI): ICD-10-CM

## 2023-10-31 PROBLEM — K58.1 IRRITABLE BOWEL SYNDROME WITH CONSTIPATION: Status: ACTIVE | Noted: 2023-01-24

## 2023-10-31 PROCEDURE — 90853 GROUP PSYCHOTHERAPY: CPT | Mod: U2,95

## 2023-10-31 NOTE — GROUP NOTE
Group Topic: Social Skills   Group Date: 10/30/2023  Start Time: 11:00 AM  End Time: 12:00 PM  Facilitators: AGUSTIN Hoover; AGUSTIN Garza   Department: [facility]        Name: Allison Sky YOB: 1980   MR: [id number]      This was a video IOP group on a HIPAA compliant platform. All patients were provided with informed consent.   Date: 10/30/2023, Time: 11am-12pm, Number of Patients: 7, User Name: CHELSEA Linton ATR, Number of Staff: 2  Group topic(s): Today's topic was sleep hygiene. During the second hour of the group, the group continues to discuss sleep hygiene, using a document of possible disturbances as our guide. The group then used a DBT chain analysis worksheet to work through a main problem that each member identified with their sleep. After completing the work sheet, a few members shared their chain analysis with the group. Grisel shared her chain analysis during this hour of group, that stated her main obstacle to sleep as her phone. She stated that her phone was a source of distraction and safety. Similar to other members' shares during this hour, Grisel mentioned that she is worried about falling asleep too early, resulting in her waking up in the middle of the night. She feels isolated and scared knowing her supports are asleep at this time, resulting in her staying up later to avoid this feeling. She was able to identify alternatives to the links preceding this behavior, as well as received helpful feedback from the group.     Primary Facilitator: LETICIA Mcgregor  Supervised by CHELSEA Piper, ATR      Secondary Facilitator: AGUSTIN Ordoñez

## 2023-10-31 NOTE — GROUP NOTE
Group Topic: Social Skills   Group Date: 10/30/2023  Start Time: 10:00 AM  End Time: 11:00 AM  Facilitators: AGUSTIN Hoover; AGUSTIN Garza   Department: [facility]        Name: Allison Sky YOB: 1980   MR: [id number]      This was a video IOP group on a HIPAA compliant platform. All patients were provided with informed consent.   Date: 10/30/2023, Time: 10-11am, Number of Patients: 8, User Name: CHELSEA Linton, ATR, Number of Staff: 2  Group topic(s): Today's topic was sleep hygiene. During the beginning portion of the group, we discussed the topic of sleep hygiene and bedtime routines. As the hour progressed, the group identified and share specific concerns related to their own sleep. Towards the end of the 10am hour, we discussed a handout on sleep hygiene that listed various activities and behaviors that may interfere with sleep. Today was Grisel's first day in IOP. Grisel shared briefly during the 10am hour that she often engages in “clock watching,” where she gets preoccupied with how much time she is wasting by staying awake. She also shared that she will have the TV on late into the night, which she mentioned may not be helpful for her sleep.    Primary Facilitator: LETICIA Mcgregor  Supervised by LOUIS PiperS, ATR      Secondary Facilitator: AGUSTIN Ordoñez

## 2023-10-31 NOTE — GROUP NOTE
Group Topic: Medication Management   Group Date: 10/31/2023  Start Time:  9:00 AM  End Time: 10:00 AM  Facilitators: AGUSTIN Garza; Betsy Thibodeaux PsyD   [facility]    Number of Participants: 9   Group Focus: psychiatric education  Treatment Modality: Patient-Centered Therapy and Psychoeducation  Interventions utilized were clarification and patient education  Purpose: insight or knowledge, self-care, and relapse prevention strategies    Name: Allison Sky YOB: 1980   MR: [id number]    This was a video IOP group on a HIPAA compliant platform. All patients were provided with informed consent.     Date:10/31/23, Time: 9:00am to 10:00am, Number of Patients: 9, User Name:[email or web address],  Number of Staff: 2  Group topic(s): Psycho education of medication management.   Group members learned about the benefits and setbacks of utilizing medications as part of their mental health treatment. Group members shared personal experiences with trying various medications, discussed side effects, short-term vs. long-term medication use and the importance of asking questions and advocating for their needs with medication prescribers.   Grisel was present on camera and appeared attentive as other group members shared. She shared having an intense fear of becoming addicted to her medications despite rationally understanding her medications are not habit forming. She share there are periods of time in her life when she does take her medications consistently and starts to feel better. She has discontinued medications for a variety of reasons such as feeling better and thinking she no longer needs them, other loved ones telling her she does not need the medication and she should pray instead, fear of becoming addicted. She agrees part of her mental health journey includes learning how to manage her intense fear and taking her medications consistently.    Provider Signature:  Kenya Spring, [facility], [facility]  Secondary facilitator(s): Michele Yoder, CT  Supervisor Armida Roy, [facility], ATR

## 2023-10-31 NOTE — GROUP NOTE
Group Topic: Coping Skills   Group Date: 10/31/2023  Start Time: 11:00 AM  End Time: 12:00 PM  Facilitators: Betsy Thibodeaux PsyD   Department:  [facility]    Number of Participants: 9   Group Focus: coping skills  Treatment Modality: Psychoeducation  Interventions utilized were exploration and patient education  Purpose: coping skills    This was a video IOP group on a HIPAA compliant platform. All patients were provided with informed consent.  User Name: [email or web address]  Number of Staff: 2  Group topic: Barriers to recovery   Participants identified barriers that get in the way of their recovery. They helped each other identify ways to overcome or cope with these barriers.   Betsy Thibodeaux Psy.D.  Secondary facilitator: LETICIA Mcgregor  Supervisor Armida Roy [facility]-S, ATR      Name: Allison Sky YOB: 1980   MR: [id number]      Pt was present and actively engaged in discussion. She stated that hypersensitivity to changes in her body creates anxiety and is a barrier to her recovery. She reported that she is using distraction to help with this.

## 2023-10-31 NOTE — GROUP NOTE
Group Topic: Coping Skills   Group Date: 10/31/2023  Start Time: 10:00 AM  End Time: 11:00 AM  Facilitators: Betsy Thibodeaux PsyD   Department:  [facility]    Number of Participants: 9   Group Focus: coping skills  Treatment Modality: Psychoeducation  Interventions utilized were exploration  Purpose: coping skills and relapse prevention strategies    This was a video IOP group on a HIPAA compliant platform. All patients were provided with informed consent.  User Name: [email or web address]  Number of Staff: 2  Group topic: Barriers to Recovery  Group members reviewed grounding activities learned in a previous group. Participants received psychoeducation on the definition of recovery. They explored guiding principles of recovery and created working definition of their own recovery.   Betsy Thibodeaux Psy.D.  Secondary facilitator: LETICIA Mcgregor  Supervisor Armida Roy [facility]-S, ATR      Name: Allison Sky YOB: 1980   MR: [id number]      Pt was present and actively engaged in discussion. She explored how recovery relates to work.

## 2023-11-01 ENCOUNTER — CLINICAL SUPPORT (OUTPATIENT)
Dept: BEHAVIORAL HEALTH | Facility: CLINIC | Age: 43
End: 2023-11-01
Payer: COMMERCIAL

## 2023-11-01 DIAGNOSIS — F41.1 GAD (GENERALIZED ANXIETY DISORDER): ICD-10-CM

## 2023-11-01 DIAGNOSIS — F33.1 MODERATE EPISODE OF RECURRENT MAJOR DEPRESSIVE DISORDER (MULTI): ICD-10-CM

## 2023-11-01 PROCEDURE — 90853 GROUP PSYCHOTHERAPY: CPT | Mod: U2,95

## 2023-11-01 NOTE — GROUP NOTE
Group Topic: Coping Skills   Group Date: 11/1/2023  Start Time:  9:00 AM  End Time: 10:00 AM  Facilitators: AGUSTIN Hoover; AGUSTIN Garza   [facility]    This was a video IOP group on a HIPAA compliant program. All patients were provided with informed consent.     Date: 11/1/2023, Time: 9-10a, Number of Patients: 8, Username: [email or web address], Number of Staff: 2  Group Topic(s): self-care. The group checked in to share a high and a low from the week so far. The group then moved into setting weekly self-care goals based on the four categories of self care including physical, social, emotional, and spiritual self-care. Group discussion and reflection followed.     Name: Allison Sky YOB: 1980   MR: [id number]      Grisel was attentive and on topic. Patient shared appropriately and endorsed this week has been challenging as her kids have been struggling and her nephew was in a car accident. Patient participated fully in exercise and identified intention for self-care including drinking water and setting up a sleep schedule.   AGUSTIN Ordoñez  Secondary facilitator: LETICIA Mcgregor  CT supervised by AGUSTIN Piper-S, ARTEM

## 2023-11-02 ENCOUNTER — CLINICAL SUPPORT (OUTPATIENT)
Dept: BEHAVIORAL HEALTH | Facility: CLINIC | Age: 43
End: 2023-11-02
Payer: COMMERCIAL

## 2023-11-02 DIAGNOSIS — F33.1 MODERATE EPISODE OF RECURRENT MAJOR DEPRESSIVE DISORDER (MULTI): ICD-10-CM

## 2023-11-02 DIAGNOSIS — F41.1 GAD (GENERALIZED ANXIETY DISORDER): ICD-10-CM

## 2023-11-02 PROCEDURE — 90853 GROUP PSYCHOTHERAPY: CPT | Mod: U2,95

## 2023-11-02 NOTE — GROUP NOTE
Group Topic: Feeling Awareness/Expression   Group Date: 11/1/2023  Start Time: 11:00 AM  End Time: 12:00 PM  Facilitators: AGUSTIN Garza; AGUSTIN Hoover   [facility]    Number of Participants: 7   Group Focus: acceptance, affirmation, feeling awareness/expression, and self-esteem  Treatment Modality: Behavior Modification Therapy and Patient-Centered Therapy  Interventions utilized were exploration and group exercise  Purpose: feelings, insight or knowledge, and self-worth    Name: Allison Sky YOB: 1980   MR: [id number]        This was a video IOP group on a HIPAA compliant platform. All patients were provided with informed consent.     Date:11/1/2023, Time:11:00a-12:00pm , Number of Patients: 7 , User Name: [email or web address],  Number of Staff: 2  Mental health diagnosis processing, separation from self and self-esteem building.  Group members continued with the exercise by writing a second letter to themselves. They were given the prompt to write to themselves in kind, gentle way and to focus on their strengths, resilience and capabilities. Group members then processed their letters and supported one another.   Grisel was present on camera and observed engaged in the writing activity. She shared her letter out loud and signed it 'Optimistic T'. She admitted she does not normally speak very kindly to herself which has an impact on her self-esteem and confidence. She was encouraged to practice more self-compassion and kindness and re-read her letter to self in times of self-doubt and criticism. She was tearful and seemed to appreciate the support from the group.    Provider Signature: AGUSTIN Aldridge-S, [facility]  Secondary facilitator(s): Michele Yoder, [place]  Supervisor Armida Roy, AGUSTIN-S, ATR

## 2023-11-02 NOTE — GROUP NOTE
Group Topic: Goals   Group Date: 11/2/2023  Start Time:  9:00 AM  End Time: 10:00 AM  Facilitators: AGUSTIN Garza; AGUSTIN Hoover   Department: [facility]    Number of Participants: 8   Group Focus: activities of daily living skills, coping skills, daily focus, and goals  Treatment Modality: Behavior Modification Therapy, Cognitive Behavioral Therapy, and Patient-Centered Therapy  Interventions utilized were assignment, exploration, and group exercise  Purpose: coping skills and self-care    Name: Allison Sky YOB: 1980   MR: [id number]    This was a video IOP group on a HIPAA compliant platform. All patients were provided with informed consent.   Date: 11/02/2023, Time: 9-10a, Number of Patients: 8, User Name: CHELSEA Linton [facility], Number of Staff: 2  Group topic(s): SMART goal setting group today. Group members identified achievement goals, enjoyment goals, and social connection goals - as well as anticipated obstacles and coping skills. Prior to writing and discussing goals, the group participated in a check-in.   Grisel set goals this week to clean her room, start reading a book, (potentially) go to a party, and take time to herself. She anticipates feeling of anxiety, needing to attend to others, and feeling frustrated as obstacles to achieving her goals. She plans to work on boundaries, read, and go to lunch by herself as coping mechanism. Grisel was visibly upset when talking about the obligation she feels to attend to people other than herself. Grisel's grandparents are elderly and much of the responsibility has fallen on her with other family members relinquishing care. WE discussed possible boundaries, coping mechanism, and avenues for help (specifically the [facility]).    Primary Facilitator: LETICIA Mcgregor  Supervised by CHELSEA Aldridge, WALDEMAR  Secondary Facilitator: CHELSEA Aldridge, [facility]

## 2023-11-02 NOTE — GROUP NOTE
Group Topic: Feeling Awareness/Expression   Group Date: 11/2/2023  Start Time: 10:00 AM  End Time: 11:00 AM  Facilitators: AGUSTIN Hoover; AGUSTIN Garza   [facility]    This was a video IOP group on a HIPAA compliant platform. All patients were provided with informed consent.     Date: 11/2/2023, Time: 10-11a, Number of Patients: 8, User Name: [email or web address], Number of Staff: 2  Group topic(s): core beliefs. The group began by exploring psychoeducation on core beliefs. The definition of core beliefs as well as how they develop and the impact of negative core beliefs were discussed. Group discussion followed.     Name: Allison Sky YOB: 1980   MR: [id number]      Grisel was on topic and present. Patient followed along appropriately despite connectivity issues that interfered with attentiveness.   AGUSTIN Ordoñez  Secondary facilitator: LETICIA Mcgregor  CT supervised by AGUSTIN Piper-S, ATR

## 2023-11-02 NOTE — GROUP NOTE
Group Topic: Feeling Awareness/Expression   Group Date: 11/2/2023  Start Time: 11:00 AM  End Time: 12:00 PM  Facilitators: AGUSTIN Hoover; AGUSTIN Garza   [facility]    This was a video IOP group on a HIPAA compliant platform. All patients were provided with informed consent.     Date: 11/2/2023, Time: 11a-12p, Number of Patients: 8, User Name: [email or web address], Number of Staff: 2  Group topic(s): core beliefs. . The group continued conversation on core beliefs and use the analogy of a tree to explore the way our prior experiences and events in our life create the roots for our core beliefs and how our behaviors, emotions, and thoughts become the branches/leaves of our tree. Group discussion followed.      Name: Allison Sky YOB: 1980   MR: [id number]    Grisel was on topic and present. Patient continued to have some connectivity issues yet was engaged when in session. Patient followed along appropriately.   AGUSTIN Ordoñez  Secondary facilitator: LETICIA Mcgregor  CT supervised by AGUSTIN Piper-S, ATR

## 2023-11-02 NOTE — GROUP NOTE
Group Topic: Feeling Awareness/Expression   Group Date: 11/1/2023  Start Time: 10:00 AM  End Time: 11:00 AM  Facilitators: AGUSTIN Garza; AGUSTIN Hoover   [facility]    Number of Participants: 7   Group Focus: clarity of thought, diagnosis education, feeling awareness/expression, and self-awareness  Treatment Modality: Cognitive Behavioral Therapy, Patient-Centered Therapy, and Psychoeducation  Interventions utilized were group exercise and patient education  Purpose: feelings, insight or knowledge, and self-worth    Name: Allison Sky YOB: 1980   MR: [id number]      This was a video IOP group on a HIPAA compliant platform. All patients were provided with informed consent.     Date:11/1/2023, Time:10:00a-11:00a, Number of Patients: 7 , User Name: [email or web address],  Number of Staff: 2  Group topic(s):  Expression of thoughts and feelings about mental health conditions and symptoms.   Group members took time to share how they feel about having mental health issues. Group members were prompted to write a letter to their mental health condition or symptoms of choice. Group members then took time to read the letters and process content.   Grisel was present on camera. She actively participated in the group discussion and exercise.      Provider Signature: AGUSTIN Aldridge-S, [facility]  Secondary facilitator(s): LETICIA Mcgregor  Supervisor AGUSTIN Piper-S, ATR

## 2023-11-03 ENCOUNTER — DOCUMENTATION (OUTPATIENT)
Dept: BEHAVIORAL HEALTH | Facility: CLINIC | Age: 43
End: 2023-11-03
Payer: COMMERCIAL

## 2023-11-03 NOTE — PROGRESS NOTES
INTENSIVE OUTPATIENT PROGRAM MULTIDISCIPLINARY  TREATMENT PLAN & PROGRESS NOTE     Patient: Cassia Sky  : 1980  Age: 43     Student: No  Treatment Team Date: 10/30/23 MRN#: [id number]    Attending Physician: Dr. MARY Pittman MD  Date of IOP Admission: 10/30/23   Ref by: Self   Week: 1                       Admission Scores:   DASS21: 43   NATY: 39 BDI: 42 PHQ-9: 17  MAST: 0 DAST: 0 MDQ: Positive    Current Risk Assessment:  Suicidal Risk:  None: X     Ideation:       Plan:      Intent:      SI Plan with plan contracts for safety:  Hx of harming self:        Homicidal Risk:  None:   X     Ideation:       Plan:      Intent:      HI Plan with means:  Hx of harming others:          Global Improvement  Clinical Global Impressions Rating Scale Of Illness:  6  1-No Illness Present   2-Minimal   3-Mild   4-Moderate   5-Marked   6-Severe X    7-Very Severe    Diagnoses & ICD-10 Codes  Primary Diagnosis & Code: Major Depressive Disorder, recurrent, moderate; F33.1  Secondary Diagnosis & Code: Generalized Anxiety Disorder; F41.1     Psychosocial & Environmental Stressors:   1. Work life   2. Family/home life   3. Ongoing mental health concerns     Patient's Treatment Goals:  Date Initiated:           Progress Update:               1.  Attend and actively participate in IOP _4_ days/week for 3 hours per day  10/30/2023   Good    Fair   Poor  Unclear X   2.  Attend weekly medication management sessions and maintain compliance of medications  10/30/2023     Good    Fair   Poor  Unclear X                                                   3.  Identify symptoms of depression and anxiety while developing coping plans  10/30/2023   Good    Fair   Poor  Unclear X                               4.  Increase self-esteem and gaining greater social support   10/30/2023  Good    Fair   Poor  Unclear X      Current medications:  See EMR chart       Progress note:  _X_New to the IOP program, attending as planned  __Compliant,  Progressing & Improving - Needs more time in IOP therapy program   __Compliant, Progressing & Improving Planning Discharge   __Compliant, Not Progressing or Improving: Reason  __Non-Compliant, not progressing or improving: Plan  __Other:    Insurance & Benefits:  [facility] [employer], IN NETWORK, BENEFITS ARE BASED ON MEDICAL NECESSITY ONLY: Unlimited visits, covers 100% of the contracted rate after deductible has been met. 1 in 1 day.     Co-Pay per day: $0    DEDUCTIBLE        Single: / Family: /  Accumulation:  Single: /     Family:  /  CO- INSURANCE  Single:  Family:   Accumulation:  Single:      Family:   OUT OF POCKET  Single: 6600 Family: [id number]  Accumulation:  Single: 602.49 Family: [id number]     Total IOP Sessions completed & authorized to date:  4    Discharge plans have been discussed with patient & medication management provider on:   Reason for discharge:    ___Successfully completed IOP treatment:   ___Pt. decided to seek treatment elsewhere:   ___Dropped out or no show more than three times:  ___Benefits exhausted:   ___Other:    Discharge date:                     Discharge Prognosis: Excellent      Good     Fair     Poor    Follow up appointment with: Physician:   Follow up appointment with: Therapist:    Follow up Aftercare group?  Yes   Patient provided with Crisis Hotline phone number for suicide prevention? Yes     Discharge Scores: Not Completed    Emergency Treatment Plan Completed by patient: Yes__  No__          DASS21:   NATY:   BDI:   Treatment plan electronically signed by Treatment Team:   MARY KAY Spring, LPCC-S, LICDC, LEANDRO Abbott, LPCC, MARY KAY Thibodeaux, PsyD, MARY KAY Mckay, APRN-CNP

## 2023-11-06 ENCOUNTER — CLINICAL SUPPORT (OUTPATIENT)
Dept: BEHAVIORAL HEALTH | Facility: CLINIC | Age: 43
End: 2023-11-06
Payer: COMMERCIAL

## 2023-11-06 DIAGNOSIS — F33.1 MODERATE EPISODE OF RECURRENT MAJOR DEPRESSIVE DISORDER (MULTI): ICD-10-CM

## 2023-11-06 DIAGNOSIS — F41.1 GAD (GENERALIZED ANXIETY DISORDER): ICD-10-CM

## 2023-11-06 PROCEDURE — 90853 GROUP PSYCHOTHERAPY: CPT | Mod: U2,95

## 2023-11-06 NOTE — GROUP NOTE
Group Topic: Goals   Group Date: 11/6/2023  Start Time:  9:00 AM  End Time: 10:00 AM  Facilitators: Kenya Spring [facility]; Edda Abbott [facility]   Department: [facility]    Number of Participants: 9   Group Focus: activities of daily living skills, check in, daily focus, and goals  Treatment Modality: Behavior Modification Therapy, Cognitive Behavioral Therapy, Patient-Centered Therapy, and Solution-Focused Therapy  Interventions utilized were assignment, exploration, problem solving, and support  Purpose: coping skills, insight or knowledge, and trigger / craving management    Name: Allison Sky YOB: 1980   MR: [id number]    This was a video IOP group on a HIPAA compliant platform. All patients were provided with informed consent.   Date: 11/6/2023  Time: 9-10a, Number of Patients: 9, User Name: Edie, Number of Staff: 2    Group topic(s): SMART goal weekend review    Group members introduced themselves to a new member and provided helpful suggestions to get the most out of their time in the program. Group members then took turns sharing progress and challenges they faced this weekend related to their identified SMART goals.     Grisel was present on camera and appeared attentive. She explained having significant dental issues and feeling confused about the two very different recommendations she recently received from 2 different dentists. She was reminded she can get a 3rd opinion, ask questions and share the contradicting suggestions with the providers before taking any actions with her dental needs. Grisel shared since she was prescribed antibiotics she stopped taking her anti-anxiety medication the last couple of days. She was encouraged to take her medication today and continue to do so even with the antibiotics. 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     Primary Facilitator CHELSEA Aldridge, BETZYWV  Secondary Facilitator LETICIA Mcgregor  Supervisor CHELSEA Aldridge, LICWV

## 2023-11-07 ENCOUNTER — TELEMEDICINE (OUTPATIENT)
Dept: BEHAVIORAL HEALTH | Facility: CLINIC | Age: 43
End: 2023-11-07
Payer: COMMERCIAL

## 2023-11-07 ENCOUNTER — CLINICAL SUPPORT (OUTPATIENT)
Dept: BEHAVIORAL HEALTH | Facility: CLINIC | Age: 43
End: 2023-11-07
Payer: COMMERCIAL

## 2023-11-07 DIAGNOSIS — F33.1 MODERATE EPISODE OF RECURRENT MAJOR DEPRESSIVE DISORDER (MULTI): ICD-10-CM

## 2023-11-07 DIAGNOSIS — F41.1 GAD (GENERALIZED ANXIETY DISORDER): Primary | ICD-10-CM

## 2023-11-07 DIAGNOSIS — F41.1 GAD (GENERALIZED ANXIETY DISORDER): ICD-10-CM

## 2023-11-07 DIAGNOSIS — F42.4 EXCORIATION (SKIN-PICKING) DISORDER: ICD-10-CM

## 2023-11-07 DIAGNOSIS — E55.9 VITAMIN D DEFICIENCY: ICD-10-CM

## 2023-11-07 PROCEDURE — 99214 OFFICE O/P EST MOD 30 MIN: CPT | Performed by: NURSE PRACTITIONER

## 2023-11-07 PROCEDURE — 90853 GROUP PSYCHOTHERAPY: CPT | Mod: U2,95

## 2023-11-07 ASSESSMENT — ENCOUNTER SYMPTOMS: UNEXPECTED WEIGHT CHANGE: 1
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
## 2023-11-07 NOTE — GROUP NOTE
Group Topic: Coping Skills   Group Date: 11/6/2023  Start Time: 10:00 AM  End Time: 11:00 AM  Facilitators: AGUSTIN Hoover; AGUSTIN Garza   Department: [facility]        Name: Allison Sky YOB: 1980   MR: [id number]      This was a video IOP group on a HIPAA compliant platform. All patients were provided with informed consent.   Date: 11/06 /2023, Time: 10-11a, Number of Patients: 9, User Name: CHELSEA Linton, ATR, Number of Staff: 2  Group topic(s): Todays group topic was Gratitude. Group members first engaged with the topic in a general discussion, where both positive and negative experiences were shared. Group members then engaged in a conversation around a PowerPoint presentation that provided some further psychoeducation on gratitude. The PowerPoint included a scientific basis for the concept and the “glacier of gratitude” metaphor. Grisel was attentive during the 10am hour and left around 10:30 to attend an appointment. She opted not to share during the conversation around gratitude but was active in the chat supporting others.    Primary Facilitator: LETICIA Mcgregor  Supervised by CHELSEA Aldridge, [facility]     Secondary Facilitator: Edda VELEZ

## 2023-11-08 ENCOUNTER — CLINICAL SUPPORT (OUTPATIENT)
Dept: BEHAVIORAL HEALTH | Facility: CLINIC | Age: 43
End: 2023-11-08
Payer: COMMERCIAL

## 2023-11-08 DIAGNOSIS — F41.1 GAD (GENERALIZED ANXIETY DISORDER): ICD-10-CM

## 2023-11-08 DIAGNOSIS — F33.1 MODERATE EPISODE OF RECURRENT MAJOR DEPRESSIVE DISORDER (MULTI): ICD-10-CM

## 2023-11-08 PROCEDURE — 90853 GROUP PSYCHOTHERAPY: CPT | Mod: U2,95

## 2023-11-08 NOTE — GROUP NOTE
Group Topic: Feeling Awareness/Expression   Group Date: 11/8/2023  Start Time:  9:00 AM  End Time: 10:00 AM  Facilitators: AGUSTIN Hoover; AGUSTIN Garza   [facility]    This was a video IOP group on a HIPAA compliant program. All patients were provided with informed consent.   Date: 11/8/2023, Time: 9-10a, Number of Patients: 9, Username: [email or web address], Number of Staff: 2  Group Topic(s): mood charting. The group began by exploring the idea of mood charting and identifying emotions/feelings they experience. The group explored different stressors or events that can impact our mood.  The group then explored weekly and daily examples of mood charting and discussing the influence of sleep, food, negative thoughts, etc. Group discussion follows.    Name: Allison Sky YOB: 1980   MR: [id number]      Grisel was on topic and attentive. Patient shared appropriately and offered feedback in the chat. Patient endorsed an example of mood influence for her as chronic pain and physical illness.   AGUSTIN Ordoñez  Secondary facilitator: LETICIA Mcgregor  CT supervised by Armida Roy, AGUSTIN-S, ATR

## 2023-11-08 NOTE — GROUP NOTE
Group Topic: Social Skills   Group Date: 11/7/2023  Start Time:  9:00 AM  End Time: 10:00 AM  Facilitators: AGUSTIN Garza; AGUSTIN Hoover   [facility]    Number of Participants: 8   Group Focus: activities of daily living skills, check in, clarity of thought, communication, and social skills  Treatment Modality: Behavior Modification Therapy, Cognitive Behavioral Therapy, and Solution-Focused Therapy  Interventions utilized were exploration, problem solving, and support  Purpose: maladaptive thinking, feelings, irrational fears, and communication skills    Name: Allison Sky YOB: 1980   MR: [id number]    This was a video IOP group on a HIPAA compliant platform. All patients were provided with informed consent.     Date:11/7/2023, Time: 9:00am-10:00 am, Number of Patients: 8, User Name: [email or web address],  Number of Staff: 2    Group topic(s): Feelings check-in followed by a discussion about asking for help. Group members identified personal reasons they find asking for help challenging. The group then spent time identifying times in which they did ask for help and they received the help they were requesting. Group members reviewed the importance of considering who they are asking assistance from, how to ask and if they have realistic expectations of others.      Allison was present on camera. She shared feeling irritated. She shared there have been times when she has not asked for help because it has been thrown in her face at a later time. She was encouraged to think about who she is asking assistance from, what she needs and looking to others who have more understanding and empathy.    Provider Signature: AGUSTIN Aldridge-S, [facility]  Secondary facilitator(s): LETICIA Mcgregor  Supervisor LOUIS AldridgeS, [facility]

## 2023-11-09 ENCOUNTER — CLINICAL SUPPORT (OUTPATIENT)
Dept: BEHAVIORAL HEALTH | Facility: CLINIC | Age: 43
End: 2023-11-09
Payer: COMMERCIAL

## 2023-11-09 DIAGNOSIS — F41.1 GAD (GENERALIZED ANXIETY DISORDER): ICD-10-CM

## 2023-11-09 DIAGNOSIS — F33.1 MODERATE EPISODE OF RECURRENT MAJOR DEPRESSIVE DISORDER (MULTI): ICD-10-CM

## 2023-11-09 PROCEDURE — 90853 GROUP PSYCHOTHERAPY: CPT | Mod: U2,95

## 2023-11-09 NOTE — GROUP NOTE
Group Topic: Coping Skills   Group Date: 11/9/2023  Start Time: 11:00 AM  End Time: 12:00 PM  Facilitators: AGUSTIN Hoover; AGUSTIN Garza   [facility]    This was a video IOP group on a HIPAA compliant program. All patients were provided with informed consent.     Date: 11/9/2023, Time: 11a-12p, Number of Patients: 10, Username: [email or web address], Number of Staff: 2  Group Topic(s): cognitive distortions/thought records. The group engaged in completing discussion on cognitive distortions including emotional reasoning, disqualifying the positive, should statements, and all-or-nothing thinking. The group then began exploring a thought record and exploring components including creating alternative responses. Group discussion followed.     Name: Allison Sky YOB: 1980   MR: [id number]      Grisel was on topic and participative. Patient followed along and completed exercise appropriately. Patient shared via the chat her experiences.   AGUSTIN Ordoñez  Secondary facilitator: Michele Yoder, CT   CT supervised by Armida Roy, AGUSTIN-S, ATR

## 2023-11-09 NOTE — GROUP NOTE
Group Topic: Coping Skills   Group Date: 11/9/2023  Start Time: 10:00 AM  End Time: 11:00 AM  Facilitators: AGUSTIN Hoover; AGUSTIN Garza   [facility]    This was a video IOP group on a HIPAA compliant program. All patients were provided with informed consent.     Date: 11/9/2023, Time: 10-11a, Number of Patients: 10, Username: [email or web address], Number of Staff: 2  Group Topic(s): cognitive distortions. The group engaged in exploring the definition of cognitive distortion. The group then explored examples of cognitive distortions including magnification, minimization, catastrophizing, overgeneralizing, magical thinking, personalization, jumping to conclusions, mind reading, and fortune telling. Group discussion followed.      Name: Allison Syk YOB: 1980   MR: [id number]      Grisel was on topic and present. Patient shared appropriately and participative. Patient shared often engaging in catastrophizing.   AGUSTIN Ordoñez  Secondary facilitator: Michele Yoder, CT  CT supervised by Armida Roy, AGUSTIN-S, ATR

## 2023-11-10 ENCOUNTER — DOCUMENTATION (OUTPATIENT)
Dept: BEHAVIORAL HEALTH | Facility: CLINIC | Age: 43
End: 2023-11-10
Payer: COMMERCIAL

## 2023-11-10 NOTE — GROUP NOTE
Group Topic: Goals   Group Date: 11/9/2023  Start Time:  9:00 AM  End Time: 10:00 AM  Facilitators: AGUSTIN Garza; AGUSTIN Hoover   [facility]    Number of Participants: 9   Group Focus: activities of daily living skills, daily focus, goals, and problem solving  Treatment Modality: Behavior Modification Therapy, Cognitive Behavioral Therapy, Leisure Development, Patient-Centered Therapy, and Skills Training  Interventions utilized were assignment and problem solving  Purpose: coping skills, self-care, relapse prevention strategies, and trigger / craving management    Name: Allison Sky YOB: 1980   MR: [id number]    This was a video IOP group on a HIPAA compliant platform. All patients were provided with informed consent.   Date: 11/09/2023, Time: 9-10a, Number of Patients: 9, User Name: CHELSEA Linton ATR, Number of Staff: 2  Group topic(s): SMART goal setting group today. Group members identified achievement goals, enjoyment goals, and social connection goals - as well as anticipated obstacles and coping skills. Prior to goal setting, group etiquette was reviewed.   Grisel set goals this weekend to clean for an hour, go to a work meeting, help her son study for his license exam, see her grandparents, go to [employer], and go bowling. She anticipates her main obstacle is dealing with the intense conversations that usually happen between her and her grandparents. Grisel agreed that setting an expectation before going to her grandparents' house may help with the usual disappointment she feels. She also identified the “stop sign” visualization and deep breathing as helpful coping mechanisms.     Primary Facilitator: LETICIA Mcgregor  Supervised by CHELSEA Aldridge, [facility]  Secondary Facilitator: CHELSEA Aldridge, [facility]

## 2023-11-10 NOTE — GROUP NOTE
Group Topic: Feeling Awareness/Expression   Group Date: 11/8/2023  Start Time: 11:00 AM  End Time: 12:00 PM  Facilitators: AGUSTIN Garza; AGUSTIN Hoover   [facility]    Number of Participants: 9   Group Focus: clarity of thought, coping skills, and psychiatric education  Treatment Modality: Cognitive Behavioral Therapy  Interventions utilized were exploration and patient education  Purpose: coping skills, maladaptive thinking, and insight or knowledge    Name: Allison Sky YOB: 1980   MR: [id number]    This session was conducted via HIPAA compliant video. Patient was provided with informed consent.  Date: 11/8/23  Time: 11:00 am to 12:00 pm  Group Members: 9  Number of Staff: 2  [email or web address]    Group Topic: Psychoeducation of CBT continued; Group members learned about automatic thoughts and how they can be unhelpful when experiencing psychological distress. Group members completed a writing exercise to identify how experiences lead to automatic thoughts. They then practiced challenging and replacing the automatic thoughts with more accurate, neutral thoughts.     Patient was present on camera. She was attentive as we reviewed thought stopping techniques and ways to develop self-awareness of unhelpful thinking patterns. Grisel shared she plans to practice this and recognizes how she rarely challenges negative thoughts about herself.     Primary Facilitator CHELSEA Aldridge, WALDEMAR  Secondary Facilitator LETICIA Mcgregor  Supervisor CHELSEA Aldridge, [facility]

## 2023-11-10 NOTE — GROUP NOTE
Group Topic: Feeling Awareness/Expression   Group Date: 11/8/2023  Start Time: 10:00 AM  End Time: 11:00 AM  Facilitators: AGUSTIN Garza; AGUSTIN Hoover   Department: [facility]    Number of Participants: 9   Group Focus: psychiatric education, reality orientation, and self-awareness  Treatment Modality: Cognitive Behavioral Therapy  Interventions utilized were patient education  Purpose: maladaptive thinking and insight or knowledge    Name: Allison Sky YOB: 1980   MR: [id number]    This session was conducted via HIPAA compliant video. Patient was provided with informed consent.    Date: 11/8/23  Time: 10:00 am to 11:00 am  Group Members: 9   Number of Staff: 2  [email or web address]    Group Topic: Psychoeducational video Introducing CBT. CBT concepts of situations, thoughts, feelings and reactions were reviewed. Group members then spent time discussing how negative automatic thoughts impact their feelings and behaviors.     Patient was present and visible on camera. She was attentive during the educational video. She recognizes how her initial thoughts can be very unhelpful and reinforce negative beliefs about herself. She shared it was very helpful to recognize and understand she does not have to entertain all thoughts that pop into her head and she can choose to let them go.     Primary Facilitator CHELSEA Aldridge, WALDEMAR  Secondary Facilitator LETICIA Mcgregor  Supervisor CHELSEA Aldridge, [facility]

## 2023-11-10 NOTE — GROUP NOTE
"Group Topic: Anger Management   Group Date: 11/7/2023  Start Time: 10:00 AM  End Time: 11:00 AM  Facilitators: Betsy Thibodaeux PsyD   Department:  [facility]    Number of Participants: 8   Group Focus: anger management  Treatment Modality: Psychoeducation  Interventions utilized were patient education  Purpose: coping skills    This was a video IOP group on a HIPAA compliant platform. All patients were provided with informed consent.  User Name: [email or web address]  Number of Staff: 2  Group topic: Anger  Group members received psychoeducation on anger as an emotion including biological changes, emotional changes and causes. They reviewed the \"anger iceberg\" and what it means to experience anger as a secondary emotion.   Betsy Thibodeaux Psy.D.  Secondary facilitator: LETICIA Mcgregor  Supervisor Armida Roy [facility]-S, ATR      Name: Allison Sky YOB: 1980   MR: [id number]      Pt was present and actively engaged in discussion. She explored how her children often bring out anger within her due to lack of following direction.         "

## 2023-11-10 NOTE — GROUP NOTE
"Group Topic: Anger Management   Group Date: 11/7/2023  Start Time: 11:00 AM  End Time: 12:00 PM  Facilitators: Betsy Thibodeaux PsyD   Department:  [facility]    Number of Participants: 8   Group Focus: anger management  Treatment Modality: Psychoeducation  Interventions utilized were patient education  Purpose: feelings    This was a video IOP group on a HIPAA compliant platform. All patients were provided with informed consent.  User Name: [email or web address]  Number of Staff: 2  Group topic: Anger  Participants explored warning signs to anger and how these symptoms change as their anger intensifies. They received education on coping with anger.   Betsy Thibodeaux Psy.D.  Secondary facilitator: Michele Yoder, CT  Supervisor Armida Roy [facility], ATR    Name: Allison Sky YOB: 1980   MR: [id number]        Pt was present and actively engaged in discussion. She stated that her warning signs are shaking legs, sweating and heavy breathing. She noted that this often leads to \"exploding\" and often swearing.       "

## 2023-11-10 NOTE — PROGRESS NOTES
INTENSIVE OUTPATIENT PROGRAM MULTIDISCIPLINARY  TREATMENT PLAN & PROGRESS NOTE     Patient: Cassia Sky  : 1980  Age: 43     Student: No  Treatment Team Date: 23 MRN#: [id number]    Attending Physician: Dr. MARY Pittman MD  Date of IOP Admission: 10/30/23   Ref by: Self   Week: 2                      Admission Scores:   DASS21: 43   NATY: 39 BDI: 42 PHQ-9: 17  MAST: 0 DAST: 0 MDQ: Positive    Current Risk Assessment:  Suicidal Risk:  None: X     Ideation:       Plan:      Intent:      SI Plan with plan contracts for safety:  Hx of harming self:        Homicidal Risk:  None:   X     Ideation:       Plan:      Intent:      HI Plan with means:  Hx of harming others:          Global Improvement  Clinical Global Impressions Rating Scale Of Illness:  6  1-No Illness Present   2-Minimal   3-Mild   4-Moderate   5-Marked   6-Severe X    7-Very Severe    Diagnoses & ICD-10 Codes  Primary Diagnosis & Code: Major Depressive Disorder, recurrent, moderate; F33.1  Secondary Diagnosis & Code: Generalized Anxiety Disorder; F41.1     Psychosocial & Environmental Stressors:   1. Work life   2. Family/home life   3. Ongoing mental health concerns     Patient's Treatment Goals:  Date Initiated:           Progress Update:               1.  Attend and actively participate in IOP _4_ days/week for 3 hours per day  10/30/2023   Good X    Fair       Poor  Unclear    2.  Attend weekly medication management sessions and maintain compliance of medications  10/30/2023     Good X    Fair       Poor  Unclear                                                    3.  Identify symptoms of depression and anxiety while developing coping plans  10/30/2023   Good        Fair X   Poor  Unclear                                4.  Increase self-esteem and gaining greater social support   10/30/2023  Good         Fair X   Poor  Unclear       Current medications:  See EMR chart       Progress note:  __New to the IOP program, attending as  planned  _X_Compliant, Progressing & Improving - Needs more time in IOP therapy program   __Compliant, Progressing & Improving Planning Discharge   __Compliant, Not Progressing or Improving: Reason  __Non-Compliant, not progressing or improving: Plan  __Other:    Insurance & Benefits:  [facility] [place], IN [place]WORK, BENEFITS ARE BASED ON MEDICAL NECESSITY ONLY: Unlimited visits, covers 100% of the contracted rate after deductible has been met. 1 in 1 day.     Co-Pay per day: $0    DEDUCTIBLE        Single: / Family: /  Accumulation:  Single: /     Family:  /  CO- INSURANCE  Single:  Family:   Accumulation:  Single:      Family:   OUT OF POCKET  Single: 6600 Family: [id number]  Accumulation:  Single: 602.49 Family: [id number]     Total IOP Sessions completed & authorized to date:  8    Discharge plans have been discussed with patient & medication management provider on:   Reason for discharge:    ___Successfully completed IOP treatment:   ___Pt. decided to seek treatment elsewhere:   ___Dropped out or no show more than three times:  ___Benefits exhausted:   ___Other:    Discharge date:                     Discharge Prognosis: Excellent      Good     Fair     Poor    Follow up appointment with: Physician:   Follow up appointment with: Therapist:    Follow up Aftercare group?  Yes   Patient provided with Crisis Hotline phone number for suicide prevention? Yes     Discharge Scores: Not Completed    Emergency Treatment Plan Completed by patient: Yes__  No__          DASS21:   NATY:   BDI:   Treatment plan electronically signed by Treatment Team:   MARY KAY Spring, LPCC-S, LICROBERT, LEANDRO Abbott, AGUSTIN, MARY KAY Thibodeaux, PsDillon, MARY KAY Mckay, APRN-CNP

## 2023-11-13 ENCOUNTER — CLINICAL SUPPORT (OUTPATIENT)
Dept: BEHAVIORAL HEALTH | Facility: CLINIC | Age: 43
End: 2023-11-13
Payer: COMMERCIAL

## 2023-11-13 DIAGNOSIS — F33.1 MODERATE EPISODE OF RECURRENT MAJOR DEPRESSIVE DISORDER (MULTI): ICD-10-CM

## 2023-11-13 DIAGNOSIS — F41.1 GAD (GENERALIZED ANXIETY DISORDER): ICD-10-CM

## 2023-11-13 PROCEDURE — 90853 GROUP PSYCHOTHERAPY: CPT | Mod: U2,95

## 2023-11-13 NOTE — GROUP NOTE
Group Topic: Goals   Group Date: 11/13/2023  Start Time:  9:00 AM  End Time: 10:00 AM  Facilitators: AGUSTIN Garza; AGUSTIN Hoover   [facility]    Number of Participants: 8   Group Focus: check in  Treatment Modality: Behavior Modification Therapy, Cognitive Behavioral Therapy, Patient-Centered Therapy, and Skills Training  Interventions utilized were problem solving and support  Purpose: coping skills, insight or knowledge, self-care, and relapse prevention strategies    Name: Allison Sky YOB: 1980   MR: [id number]    his was a video IOP group on a HIPAA compliant platform. All patients were provided with informed consent.   Date: 11/13/2023  Time: 9-10a, Number of Patients: 8, User Name: [email or web address], Number of Staff: 1    Group topic(s): SMART goal weekend review    Group members started with a daily reading about becoming aware of the snowball effect of ones thinking.Group members then took turns sharing progress and challenges they faced this weekend related to their identified SMART goals.     Grisel was present on camera.  She was attentive as other group members shared their personal experiences. She shared struggling this weekend with a dentist appointment, crippling fear and anxiety about driving and managing her home as one of her children has COVID. Grisel shared the dentist was unable to perform the dental procedure because her blood pressure was too high. She noted when she left the appointment and drove home her anxiety decreased as she got closer and closer to her home. She admits she is fearful of getting COVID and how it would impact her health and various medical conditions. Grisel was encouraged to write down her distorted thoughts and then write down neutral more accurate ways of looking at her circumstances. She was also reminded to take her medication today.    Kenya Spring, AGUSTIN-S, [facility]

## 2023-11-14 ENCOUNTER — APPOINTMENT (OUTPATIENT)
Dept: BEHAVIORAL HEALTH | Facility: CLINIC | Age: 43
End: 2023-11-14
Payer: COMMERCIAL

## 2023-11-14 NOTE — GROUP NOTE
Group Topic: Feeling Awareness/Expression   Group Date: 11/13/2023  Start Time: 10:00 AM  End Time: 11:00 AM  Facilitators: AGUSTIN Hoover; AGUSTIN Garza   [facility]    This was a video IOP group on a HIPAA compliant program. All patients were provided with informed consent.     Date: 11/13/2023, Time: 10-11a, Number of Patients: 8, Username: [email or web address], Number of Staff: 1  Group Topic(s): communication styles. The group began by exploring the concept of communication and identifying negative examples of communication. Individuals shared their own experiences and discussed types of communication they have seen. The group continued conversation by exploring types of communicating including passive, aggressive, and passive aggressive. Group discussion followed.        Name: Allison Sky YOB: 1980   MR: [id number]      Allison was on topic and present. Patient engaged appropriately and followed along throughout discussion.  AGUSTIN Ordoñez

## 2023-11-14 NOTE — GROUP NOTE
Group Topic: Feeling Awareness/Expression   Group Date: 11/13/2023  Start Time: 11:00 AM  End Time: 12:00 PM  Facilitators: AGUSTIN Hoover; AGUSTIN Garza   [facility]    This was a video IOP group on a HIPAA compliant program. All patients were provided with informed consent.     Date: 11/13/2023, Time: 11a-12p, Number of Patients: 8, Username: [email or web address], Number of Staff: 1  Group Topic(s): communication styles. The group continued exploring the concept of communication and identified examples of positive of communication. The group continued prior conversation on assertive communication. Group discussion followed.        Name: Allison Sky YOB: 1980   MR: [id number]      Grisel was on topic and present. Patient shared minimally in group exercise but followed along appropriately.   AGUSTIN Ordoñez

## 2023-11-15 ENCOUNTER — CLINICAL SUPPORT (OUTPATIENT)
Dept: BEHAVIORAL HEALTH | Facility: CLINIC | Age: 43
End: 2023-11-15
Payer: COMMERCIAL

## 2023-11-15 DIAGNOSIS — F41.1 GAD (GENERALIZED ANXIETY DISORDER): ICD-10-CM

## 2023-11-15 DIAGNOSIS — F33.1 MODERATE EPISODE OF RECURRENT MAJOR DEPRESSIVE DISORDER (MULTI): ICD-10-CM

## 2023-11-15 PROCEDURE — 90853 GROUP PSYCHOTHERAPY: CPT | Mod: U2,95

## 2023-11-15 NOTE — GROUP NOTE
Group Topic: Feeling Awareness/Expression   Group Date: 11/15/2023  Start Time: 11:00 AM  End Time: 12:00 PM  Facilitators: AGUSTIN Garza; AGUSTIN Hoover   [facility]    Number of Participants: 7   Group Focus: coping skills, daily focus, feeling awareness/expression, and mindfulness  Treatment Modality: Behavior Modification Therapy, Cognitive Behavioral Therapy, Patient-Centered Therapy, and Psychoeducation  Interventions utilized were group exercise, patient education, and support  Purpose: feelings, insight or knowledge, and trigger / craving management    Name: Allison Sky YOB: 1980   MR: [id number]    This session was conducted via HIPAA compliant video. Patient was provided with informed consent.    Date: 11/15/23  Time: 11:00 am to 12:00 pm  Group Members: 7  Number of Staff: 2  [email or web address]    Group Topic: The group focused on developing self-awareness of physical responses to various emotions such as anger, fear, sadness and happiness. Group members identified and wrote down their personal physical reactions for each emotion and shared out loud.     Patient was present on camera and was observed writing during the exercise.  Grisel was quiet but shared when called upon. She explained it is difficult for her to identify what happiness feels like in the body and realized she pushes it away. She was encouraged to allow herself to feel happiness for short spurts, 10 seconds at a time, and to relax her body in hopes that she will come to trust that it will be okay not to live in anxiety and fear constantly.    Primary Facilitator - CHELSEA Aldridge, [facility]  Secondary Facilitator - Michele Yoder, CT  Supervisor CHELSEA Aldridge, [facility]

## 2023-11-15 NOTE — GROUP NOTE
Group Topic: Feeling Awareness/Expression   Group Date: 11/15/2023  Start Time: 10:00 AM  End Time: 11:00 AM  Facilitators: AGUSTIN Garza; AGUSTIN Hoover   Department: [facility]    Number of Participants: 8   Group Focus: feeling awareness/expression, mindfulness, psychiatric education, and self-awareness  Treatment Modality: Behavior Modification Therapy, Cognitive Behavioral Therapy, Patient-Centered Therapy, and Skills Training  Interventions utilized were group exercise, patient education, and support  Purpose: feelings, insight or knowledge, and trigger / craving management    Name: Allison Sky YOB: 1980   MR: [id number]    This session was conducted via HIPAA compliant video. Patient was provided with informed consent.  Date: 11/15/23  Time: 10:00 am to 11:00 pm  Group Members: 8  Number of Staff: 2  [email or web address]    Group Topic: Emotions as tied to CBT model. Physiological responses to emotions. The group briefly reviewed the cognitive behavioral model followed by a viewing of an educational TedTalk about staying present as we feel the bodily sensations attached to unpleasant emotions. The group then processed takeaways from the educational video and how the various concepts are applicable to their well-being.     Patient was present on camera. She appeared attentive during the educational video. She was attentive but quiet as others shared their takeaways from the video.     Primary Facilitator CHELSEA Aldridge [facility]  Secondary Facilitator LETICIA Mcgregor  Supervisor CHELSEA Aldridge [facility]

## 2023-11-15 NOTE — GROUP NOTE
Group Topic: Coping Skills   Group Date: 11/15/2023  Start Time:  9:00 AM  End Time: 10:00 AM  Facilitators: AGUSTIN Hoover; AGUSTIN Garza   [facility]    This was a video IOP group on a HIPAA compliant program. All patients were provided with informed consent.     Date: 11/15/2023, Time: 9-10a, Number of Patients: 7, Username: [email or web address], Number of Staff: 2  Group Topic(s): thought records/automatic thoughts. The group engaged in reviewing the topic of the cognitive model and the connection of one's thoughts, emotions and behaviors. The exercise allowed patients to identify the situation, automatic thoughts, emotions and alternate responses. Alternate responses were challenged by asking evidence for and against, worst/best/most realistic outcomes, effect of the automatic thoughts and allowing for alternate perspectives. Group discussion and practice of the exercise followed.     Name: Allison Sky YOB: 1980   MR: [id number]      Grisel arrived 15 minutes late yet once in group was on topic and attentive. Patient appeared quiet and reserved. Patient maintained engagement throughout group exercise.   AGUSTIN Ordoñez  Secondary facilitator: Michele Yoder, CT  CT supervised by AGUSTIN Piper-S, ATR

## 2023-11-16 ENCOUNTER — CLINICAL SUPPORT (OUTPATIENT)
Dept: BEHAVIORAL HEALTH | Facility: CLINIC | Age: 43
End: 2023-11-16
Payer: COMMERCIAL

## 2023-11-16 DIAGNOSIS — F41.1 GAD (GENERALIZED ANXIETY DISORDER): ICD-10-CM

## 2023-11-16 DIAGNOSIS — F33.1 MODERATE EPISODE OF RECURRENT MAJOR DEPRESSIVE DISORDER (MULTI): ICD-10-CM

## 2023-11-16 PROBLEM — Z79.899 MEDICATION MANAGEMENT: Status: ACTIVE | Noted: 2023-11-16

## 2023-11-16 PROCEDURE — 90853 GROUP PSYCHOTHERAPY: CPT | Mod: U2,95

## 2023-11-16 NOTE — GROUP NOTE
Group Topic: Goals   Group Date: 11/16/2023  Start Time:  9:00 AM  End Time: 10:00 AM  Facilitators: AGUSTIN Hoover; AGUSTIN Garza   Department: [facility]        Name: Allison Sky YOB: 1980   MR: [id number]      This was a video IOP group on a HIPAA compliant platform. All patients were provided with informed consent.   Date: 11/16/2023, Time: 9-10a, Number of Patients: 10, User Name: AGUSTIN Linton-S, [place], Number of Staff: 2  Group topic(s): SMART goal setting group today. Group members identified achievement goals, enjoyment goals, and social connection goals - as well as anticipated obstacles and coping skills. Prior to writing and discussing goals, the group participated in a check-in using the “feelings wheel.” Grisel identified feeling tired. She set goals to clean her garage for one hour, have a movie night with her kids, spend some 1-on-1 time with each kid, and go to [place]. She believes that self-sabotaging behavior will be her main obstacle this weekend. She plans to employ coping mechanisms of the STOP visualization technique, thin-slicing, and the 5-4-3-2-1 technique.     Primary Facilitator: LETICIA Mcgregor  Supervised by AGUSTIN Aldridge-S, [facility]     Secondary Facilitator: Edda VELEZ

## 2023-11-16 NOTE — GROUP NOTE
Group Topic: Social Skills   Group Date: 11/16/2023  Start Time: 10:00 AM  End Time: 11:00 AM  Facilitators: AGUSTIN Hoover; AGUSTIN Garza   [facility]    This was a video IOP group on a HIPAA compliant program. All patients were provided with informed consent.     Date: 11/16/2023, Time: 10-11a, Number of Patients: 11, Username: [email or web address], Number of Staff: 2  Group Topic(s): interpersonal conflict; types of conflict. The group began by exploring the idea of conflict and how we often navigate interpersonal difficulty. As a whole, patients explored different types of conflict including fact based conflict, value based conflict, ego-based conflict, policy conflict and psuedoconflict. Group discussion followed. As a whole, patients explored different types of styles of conflict management including collaborating, competing, avoiding, accommodating and compromising styles. Group discussion followed.      Name: Allison Sky YOB: 1980   MR: [id number]      Grisel was attentive and on topic. Patient followed along and maintained nonverbal engagement throughout.   AGUSTIN Ordoñez    Secondary facilitator: LETICIA Mcgregor   CT supervised by AGUSTIN Piper-S, ATR

## 2023-11-16 NOTE — GROUP NOTE
Group Topic: Social Skills   Group Date: 11/16/2023  Start Time: 11:00 AM  End Time: 12:00 PM  Facilitators: AGUSTIN Hoover; AGUSTIN Garza   [facility]    This was a video IOP group on a HIPAA compliant program. All patients were provided with informed consent.     Date: 11/16/2023, Time: 11a-12p, Number of Patients: 11, Username: [email or web address], Number of Staff: 2  Group Topic(s): interpersonal conflict; assertiveness. The group continued exploring types of styles of conflict management including collaborating, competing, avoiding, accommodating and compromising styles by discussing in small groups. Group then continued to explore tips for communicating and better listening skills. Group discussion followed.      Name: Allison Sky YOB: 1980   MR: [id number]      Grisel was present and attentive. Although quiet, patient participated fully in small group exercise. Patient shared being a more avoidant/passive communicator and asked appropriate questions regarding learning to manage conflict.   AGUSTIN Ordoñez    Secondary facilitator: Michele Yoder, CT   CT supervised by AGUSTIN Piper- S

## 2023-11-17 ENCOUNTER — DOCUMENTATION (OUTPATIENT)
Dept: BEHAVIORAL HEALTH | Facility: CLINIC | Age: 43
End: 2023-11-17
Payer: COMMERCIAL

## 2023-11-17 NOTE — PROGRESS NOTES
INTENSIVE OUTPATIENT PROGRAM MULTIDISCIPLINARY  TREATMENT PLAN & PROGRESS NOTE     Patient: Cassia Sky  : 1980  Age: 43     Student: No  Treatment Team Date: 23 MRN#: [id number]    Attending Physician: Dr. MARY Pittman MD  Date of IOP Admission: 10/30/23   Ref by: Self   Week: 3                      Admission Scores:   DASS21: 43   NATY: 39 BDI: 42 PHQ-9: 17  MAST: 0 DAST: 0 MDQ: Positive    Current Risk Assessment:  Suicidal Risk:  None: X     Ideation:       Plan:      Intent:      SI Plan with plan contracts for safety:  Hx of harming self:        Homicidal Risk:  None:   X     Ideation:       Plan:      Intent:      HI Plan with means:  Hx of harming others:          Global Improvement  Clinical Global Impressions Rating Scale Of Illness:  6  1-No Illness Present   2-Minimal   3-Mild   4-Moderate   5-Marked   6-Severe X    7-Very Severe    Diagnoses & ICD-10 Codes  Primary Diagnosis & Code: Major Depressive Disorder, recurrent, moderate; F33.1  Secondary Diagnosis & Code: Generalized Anxiety Disorder; F41.1     Psychosocial & Environmental Stressors:   1. Work life   2. Family/home life   3. Ongoing mental health concerns     Patient's Treatment Goals:  Date Initiated:           Progress Update:               1.  Attend and actively participate in IOP _4_ days/week for 3 hours per day  10/30/2023   Good         Fair X    Poor  Unclear    2.  Attend weekly medication management sessions and maintain compliance of medications  10/30/2023     Good X      Fair       Poor  Unclear                                                    3.  Identify symptoms of depression and anxiety while developing coping plans  10/30/2023   Good         Fair X   Poor  Unclear                                4.  Increase self-esteem and gaining greater social support   10/30/2023  Good         Fair X   Poor  Unclear       Current medications:  See EMR chart       Progress note:  __New to the IOP program, attending as  planned  _X_Compliant, Progressing & Improving - Needs more time in IOP therapy program   __Compliant, Progressing & Improving Planning Discharge   __Compliant, Not Progressing or Improving: Reason  __Non-Compliant, not progressing or improving: Plan  __Other:    Insurance & Benefits:  [facility] [place], [place] NETWORK, BENEFITS ARE BASED ON MEDICAL NECESSITY ONLY: Unlimited visits, covers 100% of the contracted rate after deductible has been met. 1 in 1 day.     Co-Pay per day: $0    DEDUCTIBLE        Single: / Family: /  Accumulation:  Single: /     Family:  /  CO- INSURANCE  Single:  Family:   Accumulation:  Single:      Family:   OUT OF POCKET  Single: 6600 Family: [id number]  Accumulation:  Single: 602.49 Family: [id number]     Total IOP Sessions completed & authorized to date:  11    Discharge plans have been discussed with patient & medication management provider on:   Reason for discharge:    ___Successfully completed IOP treatment:   ___Pt. decided to seek treatment elsewhere:   ___Dropped out or no show more than three times:  ___Benefits exhausted:   ___Other:    Discharge date:                     Discharge Prognosis: Excellent      Good     Fair     Poor    Follow up appointment with: Physician:   Follow up appointment with: Therapist:    Follow up Aftercare group?  Yes   Patient provided with Crisis Hotline phone number for suicide prevention? Yes     Discharge Scores: Not Completed    Emergency Treatment Plan Completed by patient: Yes__  No__          DASS21:   NATY:   BDI:   Treatment plan electronically signed by Treatment Team:   MARY KAY Spring, LPCC-S, LICROBERT, LEANDRO Abbott, AGUSTIN, MARY KAY Thibodeaux, PsDillon, MRAY KAY Mckay, APRN-CNP

## 2023-11-20 ENCOUNTER — CLINICAL SUPPORT (OUTPATIENT)
Dept: BEHAVIORAL HEALTH | Facility: CLINIC | Age: 43
End: 2023-11-20
Payer: COMMERCIAL

## 2023-11-20 DIAGNOSIS — F33.1 MODERATE EPISODE OF RECURRENT MAJOR DEPRESSIVE DISORDER (MULTI): ICD-10-CM

## 2023-11-20 DIAGNOSIS — F41.1 GAD (GENERALIZED ANXIETY DISORDER): ICD-10-CM

## 2023-11-20 PROCEDURE — 90853 GROUP PSYCHOTHERAPY: CPT | Mod: U2,95

## 2023-11-20 NOTE — GROUP NOTE
Group Topic: Goals   Group Date: 11/20/2023  Start Time:  9:00 AM  End Time: 10:00 AM  Facilitators: AGUSTIN Garza; AGUSTIN Hoover   [facility]    Number of Participants: 10   Group Focus: activities of daily living skills, check in, coping skills, daily focus, and goals  Treatment Modality: Behavior Modification Therapy, Cognitive Behavioral Therapy, and Patient-Centered Therapy  Interventions utilized were problem solving and support  Purpose: coping skills, self-care, relapse prevention strategies, and trigger / craving management    Name: Allison Sky YOB: 1980   MR: [id number]    This was a video IOP group on a HIPAA compliant platform. All patients were provided with informed consent.     Date: 11/20/2023  Time: 9-10a, Number of Patients: 10, User Name: [email or web address], Number of Staff: 2    Group topic(s): Welcome and introduction to new group members followed by SMART goal weekend review.    Group members introduced themselves to a new member and provided helpful suggestions to get the most out of their time in the program. Group members then took turns sharing progress and challenges they faced this weekend related to their identified SMART goals.     Allison was present on camera and appeared attentive. She welcomed new group members. She did not have an opportunity to share about her weekend SMART goals during this hour.     Primary Facilitator CHELSEA Aldridge, [facility]  Secondary Facilitator Michele Yoder, CT  Supervisor CHELSEA Aldridge, [facility]

## 2023-11-21 ENCOUNTER — DOCUMENTATION (OUTPATIENT)
Dept: BEHAVIORAL HEALTH | Facility: CLINIC | Age: 43
End: 2023-11-21
Payer: COMMERCIAL

## 2023-11-21 ENCOUNTER — CLINICAL SUPPORT (OUTPATIENT)
Dept: BEHAVIORAL HEALTH | Facility: CLINIC | Age: 43
End: 2023-11-21
Payer: COMMERCIAL

## 2023-11-21 DIAGNOSIS — F33.1 MODERATE EPISODE OF RECURRENT MAJOR DEPRESSIVE DISORDER (MULTI): ICD-10-CM

## 2023-11-21 DIAGNOSIS — F41.1 GAD (GENERALIZED ANXIETY DISORDER): ICD-10-CM

## 2023-11-21 PROCEDURE — 90853 GROUP PSYCHOTHERAPY: CPT | Mod: U2,95
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
## 2023-11-21 NOTE — GROUP NOTE
Group Topic: Feeling Awareness/Expression   Group Date: 11/21/2023  Start Time:  9:00 AM  End Time: 10:00 AM  Facilitators: AGUSTIN Garza; Betsy Thibodeaux PsyD   [facility]    Number of Participants: 9   Group Focus: clarity of thought, coping skills, and mindfulness  Treatment Modality: Behavior Modification Therapy, Cognitive Behavioral Therapy, Patient-Centered Therapy, and Psychoeducation  Interventions utilized were exploration, group exercise, mental fitness, and patient education  Purpose: coping skills, maladaptive thinking, and insight or knowledge    Name: Allison Sky YOB: 1980   MR: [id number]      Date: 11/21/23  Time: 9:00 am to 10:00 am  Group Members: 9  Number of Staff: 2  [email or web address]    Group Topic: Psychoeducation of mindfulness.  Patients read a handout together about the origin, history and benefits of mindfulness followed by a 10 minute TedTalk. The group discussed takeaways from the educational video and then participated in a 5 minute mindfulness activity.     Patient was present on camera. She was attentive during the reading and video. She was observed writing during the exercise. She also shared her description of the object she focused on during the mindfulness exercise and how this helped her come back to the present moment. She was encouraged to practice techniques like this on a more regular basis.     Primary Facilitator - CHELSEA Aldridge LICDC  Secondary Facilitator - Michele Yoder, CT  Supervisor CHELSEA Aldridge [facility]

## 2023-11-22 ENCOUNTER — DOCUMENTATION (OUTPATIENT)
Dept: BEHAVIORAL HEALTH | Facility: CLINIC | Age: 43
End: 2023-11-22
Payer: COMMERCIAL

## 2023-11-22 ENCOUNTER — CLINICAL SUPPORT (OUTPATIENT)
Dept: BEHAVIORAL HEALTH | Facility: CLINIC | Age: 43
End: 2023-11-22
Payer: COMMERCIAL

## 2023-11-22 DIAGNOSIS — F33.1 MODERATE EPISODE OF RECURRENT MAJOR DEPRESSIVE DISORDER (MULTI): Primary | ICD-10-CM

## 2023-11-22 DIAGNOSIS — F41.1 GAD (GENERALIZED ANXIETY DISORDER): ICD-10-CM

## 2023-11-22 DIAGNOSIS — F42.4 EXCORIATION (SKIN-PICKING) DISORDER: ICD-10-CM

## 2023-11-22 PROCEDURE — 90853 GROUP PSYCHOTHERAPY: CPT | Mod: U2,95

## 2023-11-22 NOTE — PROGRESS NOTES
INTENSIVE OUTPATIENT PROGRAM MULTIDISCIPLINARY  TREATMENT PLAN & PROGRESS NOTE     Patient: Cassia Sky  : 1980  Age: 43     Student: No  Treatment Team Date: 23 MRN#: [id number]    Attending Physician: Dr. MARY Pittman MD  Date of IOP Admission: 10/30/23   Ref by: Self   Week: 4                      Admission Scores:   DASS21: 43   NATY: 39 BDI: 42 PHQ-9: 17  MAST: 0 DAST: 0 MDQ: Positive    Current Risk Assessment:  Suicidal Risk:  None: X     Ideation:       Plan:      Intent:      SI Plan with plan contracts for safety:  Hx of harming self:        Homicidal Risk:  None:   X     Ideation:       Plan:      Intent:      HI Plan with means:  Hx of harming others:          Global Improvement  Clinical Global Impressions Rating Scale Of Illness:  6  1-No Illness Present   2-Minimal   3-Mild   4-Moderate   5-Marked   6-Severe X    7-Very Severe    Diagnoses & ICD-10 Codes  Primary Diagnosis & Code: Major Depressive Disorder, recurrent, moderate; F33.1  Secondary Diagnosis & Code: Generalized Anxiety Disorder; F41.1     Psychosocial & Environmental Stressors:   1. Work life   2. Family/home life   3. Ongoing mental health concerns     Patient's Treatment Goals:  Date Initiated:           Progress Update:               1.  Attend and actively participate in IOP _4_ days/week for 3 hours per day  10/30/2023   Good X      Fair      Poor  Unclear    2.  Attend weekly medication management sessions and maintain compliance of medications  10/30/2023     Good X      Fair      Poor  Unclear                                                    3.  Identify symptoms of depression and anxiety while developing coping plans  10/30/2023   Good         Fair X   Poor  Unclear                                4.  Increase self-esteem and gaining greater social support   10/30/2023  Good         Fair X   Poor  Unclear       Current medications:  See EMR chart       Progress note  __New to the IOP program, attending as  planned  _X_Compliant, Progressing & Improving - Needs more time in IOP therapy program   __Compliant, Progressing & Improving Planning Discharge   __Compliant, Not Progressing or Improving: Reason  __Non-Compliant, not progressing or improving: Plan  __Other:    Insurance & Benefits:  [facility] [place], IN NETWORK, BENEFITS ARE BASED ON MEDICAL NECESSITY ONLY: Unlimited visits, covers 100% of the contracted rate after deductible has been met. 1 in 1 day.     Co-Pay per day: $0    DEDUCTIBLE        Single: / Family: /  Accumulation:  Single: /     Family:  /  CO- INSURANCE  Single:  Family:   Accumulation:  Single:      Family:   OUT OF POCKET  Single: 6600 Family: [id number]  Accumulation:  Single: 602.49 Family: [id number]     Total IOP Sessions completed & authorized to date:  14    Discharge plans have been discussed with patient & medication management provider on:   Reason for discharge:    ___Successfully completed IOP treatment:   ___Pt. decided to seek treatment elsewhere:   ___Dropped out or no show more than three times:  ___Benefits exhausted:   ___Other:    Discharge date:                     Discharge Prognosis: Excellent      Good     Fair     Poor    Follow up appointment with: Physician:   Follow up appointment with: Therapist:    Follow up Aftercare group?  Yes   Patient provided with Crisis Hotline phone number for suicide prevention? Yes     Discharge Scores: Not Completed    Emergency Treatment Plan Completed by patient: Yes__  No__          DASS21:   NATY:   BDI:   Treatment plan electronically signed by Treatment Team:   MARY KAY Spring, LPCC-S, LICROBERT, LEANDRO Abbott, AGUSTIN, MARY KAY Thibodeaux, PsDillon, MARY KAY Mckay, APRN-CNP

## 2023-11-22 NOTE — GROUP NOTE
Group Topic: Self Esteem   Group Date: 11/21/2023  Start Time: 11:00 AM  End Time: 12:00 PM  Facilitators: Betsy Thibodeaux PsyD   Department:  [facility]    Number of Participants: 10   Group Focus: self-esteem  Treatment Modality: Psychoeducation  Interventions utilized were exploration and patient education  Purpose: self-worth    This was a video IOP group on a HIPAA compliant platform. All patients were provided with informed consent.  User Name: [email or web address]  Number of Staff: 2  Group topic: Self-compassion  Participants received psycho-education on the components of self-compassion (I.e., self-kindness, common humanity and mindfulness).   Betsy Thibodeaux Psy.D.  Secondary facilitator: LETICIA Mcgregor  Supervisor Armida Roy [facility], ATR      Name: Allison Sky YOB: 1980   MR: [id number]      Grisel was present and actively listened to discussion.

## 2023-11-22 NOTE — GROUP NOTE
Group Topic: Social Skills   Group Date: 11/20/2023  Start Time: 11:00 AM  End Time: 12:00 PM  Facilitators: AGUSTIN Hoover; AGUSTIN Garza   Department: [facility]        Name: Allison Sky YOB: 1980   MR: [id number]      This was a video IOP group on a HIPAA compliant platform. All patients were provided with informed consent.   Date: 11/20/2023, Time: 11a-12p, Number of Patients: 11, User Name: CHELSEA Linton, ATR, Number of Staff: 2  Group topic(s): During the second hour of group, we continued the conversation on Stages of Change. The group discussed their largest boundaries to change, as well as any replacement behaviors they have found to be useful. Lastly, the group then filled out a behavior modification worksheet that focuses in on one behavior they would like to change, that they later shared aloud. Grisel offered multiple group members support during the hour. She did not specifically touch on a behavior she would like to change but was able to relate to many of the other group members' struggles and concerns.    Primary Facilitator: LETICIA Mcgregor  Supervised by CHELSEA Aldridge, [facility]     Secondary Facilitator: AGUSTIN Ordoñez

## 2023-11-22 NOTE — GROUP NOTE
Group Topic: Social Skills   Group Date: 11/20/2023  Start Time: 10:00 AM  End Time: 11:00 AM  Facilitators: AGUSTIN Hoover; AGUSTIN Garza   Department: [facility]        Name: Allison Sky YOB: 1980   MR: [id number]      This was a video IOP group on a HIPAA compliant platform. All patients were provided with informed consent.   Date: 11/20/2023, Time: 10-11a, Number of Patients: 11, User Name: CHELSEA Linton, ATR, Number of Staff: 2  Group topic(s): Today's group topic was stages of change. Prior to getting into the topic, a few group members shared about their weekends and how their SMART goals went. During the first hour of group, we discussed the various stages of change. During each stage, the group was asked about their own experiences with being in these stages. Grisel did not share explicitly about her thoughts on stages of change in the first hour. Grisel did review her weekend SMART goals, which were not met due to other obligations. She described feeling stressed about taking care of her elderly grandparents, who are often disrespectful towards her.     Primary Facilitator: Michele Yoder CT  Supervised by CHELSEA Aldridge, [facility]     Secondary Facilitator: AGUSTIN Ordoñez

## 2023-11-22 NOTE — GROUP NOTE
Group Topic: Self Esteem   Group Date: 11/21/2023  Start Time: 10:00 AM  End Time: 11:00 AM  Facilitators: Betsy Thibodeaux PsyD   Department:  [facility]    Number of Participants: 10   Group Focus: self-esteem  Treatment Modality: Psychoeducation  Interventions utilized were exploration and patient education  Purpose: self-worth    This was a video IOP group on a HIPAA compliant platform. All patients were provided with informed consent.  User Name: [email or web address]  Number of Staff: 2  Group topic: Self-compassion   Group members engaged in loving kindness meditation. They explored how it is related to self-compassion. They identified what self-compassion means to them.   Betsy Thibodeaux Psy.D.  Secondary facilitator: LETICIA Mcgregor  Supervisor Armida Roy [facility], ATR      Name: Allison Sky YOB: 1980   MR: [id number]      Grisel was present and actively engaged in discussion. She stated that she did not like the meditation due to distraction and difficulty engaging.

## 2023-11-24 NOTE — GROUP NOTE
Group Topic: Safety   Group Date: 11/22/2023  Start Time: 10:00 AM  End Time: 11:00 AM  Facilitators: AGUSTIN Garza; AGUSTIN Hoover   [facility]    Number of Participants: 7   Group Focus: safety plan  Treatment Modality: Behavior Modification Therapy, Cognitive Behavioral Therapy, and Patient-Centered Therapy  Interventions utilized were assignment, problem solving, and support  Purpose: coping skills, relapse prevention strategies, and trigger / craving management    Name: Allison Sky YOB: 1980   MR: [id number]    This session was conducted via HIPAA compliant video. Patient was provided with informed consent.    Date:11/22/23  Time: 10:00 am to 11:00 am  Group Members: 7  Number of Staff:2  [email or web address]    Group Topic: Group members participated in Safety Planning for the long weekend and holiday. Group members identified warning signs, stressors and situations that pose a threat to their safety. Group members then identified specific coping strategies to engage in, ways to keep their environment safe and people and places they can utilize for support. Group members were provided crisis hotlines and websites to contact if needed as well.     Grisel was present on camera. She was observed writing during the exercise. She shared others tell her her face gets scrunched up, she becomes easily distracted and snippy when she is starting to not be in a good place mentally and emotionally.     Primary Facilitator - CHELSEA Aldridge, [facility]  Secondary Facilitator - Michele Yoder, CT  Supervisor CHELSEA Aldridge, [facility]

## 2023-11-24 NOTE — GROUP NOTE
Group Topic: Safety   Group Date: 11/22/2023  Start Time: 11:00 AM  End Time: 12:00 PM  Facilitators: AGUSTIN Garza; AGUSTIN Hoover   [facility]    Number of Participants: 7   Group Focus: safety plan and suicide prevention skills  Treatment Modality: Behavior Modification Therapy, Cognitive Behavioral Therapy, and Patient-Centered Therapy  Interventions utilized were group exercise, problem solving, and support  Purpose: coping skills, relapse prevention strategies, and trigger / craving management    Name: Cassia Sky YOB: 1980   MR: [id number]    This session was conducted via HIPAA compliant video. Patient was provided with informed consent.    Date:11/22/23  Time: 11:00 am to 12:00 am  Group Members: 7  Number of Staff:2  [email or web address]    Group Topic: Safety Planning Continued. Group members continued to focus on safety planning worksheet and shared ways to cope over the long holiday weekend. The group finished the hour by identifying one thing in their lives to which they have gratitude.    Cassia was present on camera. She was observed continuing to write during this hour. She identified struggled to identify her strengths and grew tearful when group members shared how they have seen her kindness, love and ability to care for elderly family members as strengths in their eyes.       Primary Facilitator - CHELSEA Aldridge, [facility]  Secondary Facilitator - LETICIA Mcgregor  Supervisor CHELSEA Aldridge, [facility]

## 2023-11-27 ENCOUNTER — CLINICAL SUPPORT (OUTPATIENT)
Dept: BEHAVIORAL HEALTH | Facility: CLINIC | Age: 43
End: 2023-11-27
Payer: COMMERCIAL

## 2023-11-27 DIAGNOSIS — F33.1 MODERATE EPISODE OF RECURRENT MAJOR DEPRESSIVE DISORDER (MULTI): ICD-10-CM

## 2023-11-27 DIAGNOSIS — F41.1 GAD (GENERALIZED ANXIETY DISORDER): ICD-10-CM

## 2023-11-27 PROCEDURE — 90853 GROUP PSYCHOTHERAPY: CPT | Mod: U2,95

## 2023-11-27 NOTE — GROUP NOTE
Group Topic: Goals   Group Date: 11/27/2023  Start Time:  9:00 AM  End Time: 10:00 AM  Facilitators: AGUSTIN Garza; AGUSTIN Hoover   [facility]    Number of Participants: 11   Group Focus: activities of daily living skills, check in, coping skills, daily focus, and goals  Treatment Modality: Behavior Modification Therapy, Cognitive Behavioral Therapy, Patient-Centered Therapy, Skills Training, and Solution-Focused Therapy  Interventions utilized were group exercise and problem solving  Purpose: coping skills, self-care, relapse prevention strategies, and trigger / craving management    Name: Allison Sky YOB: 1980   MR: [id number]    This was a video IOP group on a HIPAA compliant platform. All patients were provided with informed consent.     Date: 11/27/2023 Time: 9-10a, Number of Patients: 11, User Name: [email or web address], Number of Staff: 2    Group topic(s): SMART goal weekend review    Group members introduced themselves to a new member and provided helpful suggestions to get the most out of their time in the program. Group members then took turns sharing progress and challenges they faced this weekend related to their identified SMART goals.     Grsiel was present on camera and appeared attentive. She welcomed new group members and appeared to be actively listening as others shared about their weekend experiences. Grisel shared cooking Thanksgiving dinner. She opened up to the group about her families long hx of addiction and it's impact on her and her relationships. She discussed having strong anxiety that prevented her from spending time with certain loved ones but also feeling determined at one point to prove a point and was able to drive to a family gathering to take a photo and she was received with love and genuine happy surprise from her other loved ones when she showed up.    Primary Facilitator Kenya Spring, AGUSTIN-S, [facility]  Secondary  Facilitator Michele Yoder, CT  Supervisor Kenya Spring, [facility], [facility]

## 2023-11-28 ENCOUNTER — CLINICAL SUPPORT (OUTPATIENT)
Dept: BEHAVIORAL HEALTH | Facility: CLINIC | Age: 43
End: 2023-11-28
Payer: COMMERCIAL

## 2023-11-28 ENCOUNTER — TELEMEDICINE (OUTPATIENT)
Dept: BEHAVIORAL HEALTH | Facility: CLINIC | Age: 43
End: 2023-11-28
Payer: COMMERCIAL

## 2023-11-28 DIAGNOSIS — F42.4 EXCORIATION (SKIN-PICKING) DISORDER: ICD-10-CM

## 2023-11-28 DIAGNOSIS — E55.9 VITAMIN D DEFICIENCY: ICD-10-CM

## 2023-11-28 DIAGNOSIS — F41.1 GAD (GENERALIZED ANXIETY DISORDER): ICD-10-CM

## 2023-11-28 DIAGNOSIS — F33.1 MODERATE EPISODE OF RECURRENT MAJOR DEPRESSIVE DISORDER (MULTI): ICD-10-CM

## 2023-11-28 DIAGNOSIS — Z79.899 MEDICATION MANAGEMENT: ICD-10-CM

## 2023-11-28 PROCEDURE — 90853 GROUP PSYCHOTHERAPY: CPT | Mod: U2,95

## 2023-11-28 PROCEDURE — 99214 OFFICE O/P EST MOD 30 MIN: CPT | Performed by: NURSE PRACTITIONER

## 2023-11-28 RX ORDER — SERTRALINE HYDROCHLORIDE 50 MG/1
TABLET, FILM COATED ORAL
Qty: 60 TABLET | Refills: 0 | Status: SHIPPED | OUTPATIENT
Start: 2023-11-28 | End: 2024-01-02 | Stop reason: SDUPTHER

## 2023-11-28 RX ORDER — BUSPIRONE HYDROCHLORIDE 15 MG/1
15 TABLET ORAL 2 TIMES DAILY
Qty: 60 TABLET | Refills: 11 | Status: SHIPPED | OUTPATIENT
Start: 2023-11-28 | End: 2024-01-02 | Stop reason: WASHOUT

## 2023-11-28 ASSESSMENT — ENCOUNTER SYMPTOMS: UNEXPECTED WEIGHT CHANGE: 1

## 2023-11-28 NOTE — GROUP NOTE
Group Topic: Social Skills   Group Date: 11/27/2023  Start Time: 10:00 AM  End Time: 11:00 AM  Facilitators: AGUSTIN Hoover; AGUSTIN Garza   Department: [facility]        Name: Allison Sky YOB: 1980   MR: [id number]      This was a video IOP group on a HIPAA compliant platform. All patients were provided with informed consent.   Date: 11/27/2023, Time: 10-11a, Number of Patients: 12, User Name: CHELSEA Linton, ATR, Number of Staff: 2  Group topic(s): Today's group topic was personal strengths. Prior to getting into the topic, a few group members shared about their weekends and how their SMART goals went. During the first hour of group there was a psychoeducational portion, followed by a general conversation about personal strengths. Grisel did not contribute to the conversations during the 10am hour, but she was attentive and engaged. She often nodded along as other group members described struggles with realizing their personal strengths.      Primary Facilitator: LETICIA Mcgregor  Supervised by CHELSEA Aldridge, [facility]     Secondary Facilitator: AGUSTIN Ordoñez
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
## 2023-11-28 NOTE — GROUP NOTE
Group Topic: Social Skills   Group Date: 11/27/2023  Start Time: 11:00 AM  End Time: 12:00 PM  Facilitators: AGUSTIN Hoover; AGUSTIN Garza   Department: [facility]        Name: Allison Sky YOB: 1980   MR: [id number]      This was a video IOP group on a HIPAA compliant platform. All patients were provided with informed consent.   Date: 11/27/2023, Time: 11a-12p, Number of Patients: 12, User Name: CHELSEA Linton, ATR, Number of Staff: 2  Group topic(s): Today's group topic was personal strengths. During the hour, the group was asked to write down (and share) a person who they admire and a strength this person possesses. They were then asked to do the same but reflecting on their own personal strengths. Lastly, the group participated in an activity that helped group members find day-to-day activities that they might be able to incorporate their strengths into - which they later shared aloud. Grisel identified her strengths as empathy, generosity, and honesty. She plans to employ these strengths by going shopping with her kids, asking about her partner's life, and being honest about her own feelings.    Primary Facilitator: LETICIA Mcgregor  Supervised by CHELSEA Aldridge, [facility]     Secondary Facilitator: AGUSTIN Ordoñez

## 2023-11-29 ENCOUNTER — CLINICAL SUPPORT (OUTPATIENT)
Dept: BEHAVIORAL HEALTH | Facility: CLINIC | Age: 43
End: 2023-11-29
Payer: COMMERCIAL

## 2023-11-29 DIAGNOSIS — F41.1 GAD (GENERALIZED ANXIETY DISORDER): ICD-10-CM

## 2023-11-29 DIAGNOSIS — F33.1 MODERATE EPISODE OF RECURRENT MAJOR DEPRESSIVE DISORDER (MULTI): ICD-10-CM

## 2023-11-29 PROCEDURE — 90853 GROUP PSYCHOTHERAPY: CPT | Mod: U2,95

## 2023-11-29 NOTE — GROUP NOTE
Group Topic: Social Skills   Group Date: 11/29/2023  Start Time: 10:00 AM  End Time: 11:00 AM  Facilitators: AGUSTIN Garza; AGUSTIN Hoover   [facility]    Number of Participants: 12   Group Focus: communication, healthy friendships, self-awareness, and social skills  Treatment Modality: Behavior Modification Therapy, Cognitive Behavioral Therapy, Patient-Centered Therapy, and Skills Training  Interventions utilized were patient education, problem solving, and support  Purpose: coping skills, communication skills, insight or knowledge, and self-care    Name: Allison Sky YOB: 1980   MR: [id number]    This session was conducted via HIPAA compliant video. Patient was provided with informed consent.  Date: 11/29/2023  Time: 10:00 am to 11:00 am  Group Members: 12  Number of Staff: 2  [email or web address]    Group Topic:  Group started with a review of Shanda Alfaro's 41149 method and how they tried it in the last 24 hours. The group then focused on psychoeducation of healthy vs. unhealthy boundaries. Appropriate boundaries were defined and group members identified individuals in their personal lives they would like to create healthier boundaries with and why.     Allison was attentive and visible on camera. She appeared engaged and genuinely interested in the discussion about boundaries.     Primary Facilitator - CHELSEA Aldridge, [facility]  Secondary Facilitator - LETICIA Mcgregor  Supervised by CHELSEA Aldridge, [facility]

## 2023-11-30 ENCOUNTER — CLINICAL SUPPORT (OUTPATIENT)
Dept: BEHAVIORAL HEALTH | Facility: CLINIC | Age: 43
End: 2023-11-30
Payer: COMMERCIAL

## 2023-11-30 DIAGNOSIS — F41.1 GAD (GENERALIZED ANXIETY DISORDER): ICD-10-CM

## 2023-11-30 DIAGNOSIS — F33.1 MODERATE EPISODE OF RECURRENT MAJOR DEPRESSIVE DISORDER (MULTI): ICD-10-CM

## 2023-11-30 PROCEDURE — 90853 GROUP PSYCHOTHERAPY: CPT | Mod: U2,95

## 2023-11-30 NOTE — GROUP NOTE
Group Topic: Self Esteem   Group Date: 11/28/2023  Start Time: 11:00 AM  End Time: 12:00 PM  Facilitators: Betsy Thibodeaux PsyD   Department:  [facility]    Number of Participants: 11   Group Focus: self-esteem  Treatment Modality: Psychoeducation  Interventions utilized were exploration and patient education  Purpose: coping skills and self-worth    This was a video IOP group on a HIPAA compliant platform. All patients were provided with informed consent.  User Name: [email or web address]  Number of Staff: 2  Group topic: Self-care   Group members received psychoeducation on changing negative self-talk. They identified steps to change thoughts and how to use challenging questions to reframe thinking.   Betsy Thibodeaux Psy.D.  Secondary facilitator: LETICIA Mcgregor  Supervisor Armida Roy [facility], ATR      Name: Allison Sky YOB: 1980   MR: [id number]      Grisel was present and actively listened to discussion.

## 2023-11-30 NOTE — GROUP NOTE
Group Topic: Social Skills   Group Date: 11/30/2023  Start Time: 11:00 AM  End Time: 12:00 PM  Facilitators: AGUSTIN Hoover; AGUSTIN Garza   [facility]    This was a video IOP group on a HIPAA compliant platform. All patients were provided with informed consent.     Date: 11/30/2023, Time: 11a-12p, Number of Patients: 10, User Name: [email or web address],  Number of Staff: 2  Group topic(s): defense mechanisms. The group explored the concept of defense mechanisms including when we may use them and the ways they may help us and hurt us. As a whole discussion was had on different types of defense mechanisms including denial, displacement, projection, rationalization, reaction formation, regression, repression, sublimation, and intellectualization. Examples and personal situations regarding using defense mechanisms in their day to day lives.    Name: Allison Sky YOB: 1980   MR: [id number]      Grisle was attentive and on topic. Patient followed along appropriately and engaged fully.   AGUSTIN Ordoñez    Secondary facilitator: LETICIA Mcgregor   CT supervised by Kenya Spring, AGUSTIN-S, [facility]

## 2023-11-30 NOTE — GROUP NOTE
Group Topic: Feeling Awareness/Expression   Group Date: 11/29/2023  Start Time:  9:00 AM  End Time: 10:00 AM  Facilitators: AGUSTIN Hoover; AGUSTIN Garza   [facility]    This was a video IOP group on a HIPAA compliant platform. All patients were provided with informed consent.     Date: 11/29/2023, Time: 9-10a, Number of Patients: 11, User Name: [email or web address],  Number of Staff: 2  Group topic(s): юлия, thorn, bud reflection. The group engaged in exploring a reflection of their week thus far by identifying their юлия (a positive, a highlight or a success), thorn (a challenge or something you can use more support with) and a bud (new idea or something you're looking forward to knowing or understanding more). Group discussion followed.      Name: Allison Sky YOB: 1980   MR: [id number]      Grisel was on topic and present. Patient followed along and engaged appropriately in exercise.   AGUSTIN Ordoñez    Secondary facilitator: Michele Yoder, CT  CT supervised by Armida Roy, AGUSTIN-S, ATR

## 2023-11-30 NOTE — GROUP NOTE
Group Topic: Social Skills   Group Date: 11/29/2023  Start Time: 11:00 AM  End Time: 12:00 PM  Facilitators: AGUSTIN Garza; AGUSTIN Hoover   [facility]    Number of Participants: 11   Group Focus: communication, coping skills, healthy friendships, problem solving, and social skills  Treatment Modality: Behavior Modification Therapy, Cognitive Behavioral Therapy, Patient-Centered Therapy, and Skills Training  Interventions utilized were exploration, group exercise, and problem solving  Purpose: coping skills, communication skills, and self-care    Name: Allison Sky YOB: 1980   MR: [id number]    This session was conducted via HIPAA compliant video. Patient was provided with informed consent.  Date: 11/29/2023  Time: 11:00 am to 12:00 pm  Group Members: 11  Number of Staff: 2  [email or web address]    Group Topic:  Boundaries continued. The group continued to review what healthy boundaries entail and steps to take to develop them in their personal lives.     Grisel was attentive and visible on camera. She was engaged during the discussion and listened as others shared struggles and desires to make changes.  She shared how her boyfriend seems to be able to put boundaries in place for his needs but when she tries to do something similar and request time alone she's met with pushback and judgement.    Primary Facilitator - CHELSEA Aldridge, [facility]  Secondary Facilitator - LETICIA Mcgregor  Supervised by CHELSEA Aldridge, [facility]

## 2023-11-30 NOTE — GROUP NOTE
Group Topic: Social Skills   Group Date: 11/30/2023  Start Time: 10:00 AM  End Time: 11:00 AM  Facilitators: AGUSTIN Hoover; AGUSTIN Garza   [facility]    This was a video IOP group on a HIPAA compliant platform. All patients were provided with informed consent.     Date: 11/30/2023, Time: 10-11a, Number of Patients: 10, User Name: [email or web address],  Number of Staff: 2  Group topic(s): defense mechanisms. The group explored the concept of defense mechanisms including when we may use them and the ways they may help us and hurt us. As a whole discussion was had on different types of defense mechanisms including denial, displacement, projection, and rationalization. Examples and personal situations regarding using defense mechanisms in their day to day lives.    Name: Allison Sky YOB: 1980   MR: [id number]      Grisel was present and on topic. Patient maintained attentiveness by following along and engaging nonverbally.   AGUSTIN Ordoñez    Secondary facilitator: LETICIA Mcgregor   CT supervised by Kenya Spring, AGUSTIN-S, [facility]

## 2023-12-01 ENCOUNTER — DOCUMENTATION (OUTPATIENT)
Dept: BEHAVIORAL HEALTH | Facility: CLINIC | Age: 43
End: 2023-12-01
Payer: COMMERCIAL

## 2023-12-01 NOTE — PROGRESS NOTES
INTENSIVE OUTPATIENT PROGRAM MULTIDISCIPLINARY  TREATMENT PLAN & PROGRESS NOTE     Patient: Cassia Sky  : 1980  Age: 43     Student: No  Treatment Team Date: 23 MRN#: [id number]    Attending Physician: Dr. MARY Pittman MD  Date of IOP Admission: 10/30/23   Ref by: Self   Week: 5                      Admission Scores:   DASS21: 43   NATY: 39 BDI: 42 PHQ-9: 17  MAST: 0 DAST: 0 MDQ: Positive    Current Risk Assessment:  Suicidal Risk:  None: X     Ideation:       Plan:      Intent:      SI Plan with plan contracts for safety:  Hx of harming self:        Homicidal Risk:  None:   X     Ideation:       Plan:      Intent:      HI Plan with means:  Hx of harming others:          Global Improvement  Clinical Global Impressions Rating Scale Of Illness:  6  1-No Illness Present   2-Minimal   3-Mild   4-Moderate   5-Marked   6-Severe X    7-Very Severe    Diagnoses & ICD-10 Codes  Primary Diagnosis & Code: Major Depressive Disorder, recurrent, moderate; F33.1  Secondary Diagnosis & Code: Generalized Anxiety Disorder; F41.1     Psychosocial & Environmental Stressors:   1. Work life   2. Family/home life   3. Ongoing mental health concerns     Patient's Treatment Goals:  Date Initiated:           Progress Update:               1.  Attend and actively participate in IOP _4_ days/week for 3 hours per day  10/30/2023   Good X      Fair      Poor  Unclear    2.  Attend weekly medication management sessions and maintain compliance of medications  10/30/2023     Good X      Fair      Poor  Unclear                                                    3.  Identify symptoms of depression and anxiety while developing coping plans  10/30/2023   Good         Fair X   Poor  Unclear                                4.  Increase self-esteem and gaining greater social support   10/30/2023  Good         Fair X   Poor  Unclear       Current medications:  See EMR chart       Progress note  __New to the IOP program, attending as  planned  _X_Compliant, Progressing & Improving - Needs more time in IOP therapy program   __Compliant, Progressing & Improving Planning Discharge   __Compliant, Not Progressing or Improving: Reason  __Non-Compliant, not progressing or improving: Plan  __Other:    Insurance & Benefits:  [employer] [place], IN NETWORK, BENEFITS ARE BASED ON MEDICAL NECESSITY ONLY: Unlimited visits, covers 100% of the contracted rate after deductible has been met. 1 in 1 day.     Co-Pay per day: $0    DEDUCTIBLE        Single: / Family: /  Accumulation:  Single: /     Family:  /  CO- INSURANCE  Single:  Family:   Accumulation:  Single:      Family:   OUT OF POCKET  Single: 6600 Family: [id number]  Accumulation:  Single: 602.49 Family: [id number]     Total IOP Sessions completed & authorized to date:  18    Discharge plans have been discussed with patient & medication management provider on:   Reason for discharge:    ___Successfully completed IOP treatment:   ___Pt. decided to seek treatment elsewhere:   ___Dropped out or no show more than three times:  ___Benefits exhausted:   ___Other:    Discharge date:                     Discharge Prognosis: Excellent      Good     Fair     Poor    Follow up appointment with: Physician:   Follow up appointment with: Therapist:    Follow up Aftercare group?  Yes   Patient provided with Crisis Hotline phone number for suicide prevention? Yes     Discharge Scores: Not Completed    Emergency Treatment Plan Completed by patient: Yes__  No__          DASS21:   NATY:   BDI:   Treatment plan electronically signed by Treatment Team:   MARY KAY Spring, LPCC-S, LICROBERT, LEANDRO Abbott, AGUSTIN, MAR YKAY Thibodeaux, PsDillon, MARY KAY Mckay, APRN-CNP

## 2023-12-01 NOTE — GROUP NOTE
Group Topic: Goals   Group Date: 11/30/2023  Start Time:  9:00 AM  End Time: 10:00 AM  Facilitators: AGUSTIN Hoover; AGUSTIN Garza   Department: [facility]        Name: Allison Sky YOB: 1980   MR: [id number]      This was a video IOP group on a HIPAA compliant platform. All patients were provided with informed consent.   Date: 11/30/2023, Time: 9-10a, Number of Patients: 11, User Name: CHELSEA Linton, ATR, Number of Staff: 2  Group topic(s): SMART goal setting group today. Group members identified achievement goals, enjoyment goals, and social connection goals - as well as anticipated obstacles and coping skills. Prior to writing and discussing goals, the group revisited their strength-based goals set on Monday. Grisel set goals to start her new meds, get a [place] tree, call her friends, finish making her son's blanket, and take a hot bath. She anticipates obstacles of anxiety, family, and negative self-talk. She plans to use the 5-4-3-2-1 technique and deep breathing as coping mechanisms.     Primary Facilitator: Michele Yoder CT  Supervised by CHELSEA Aldridge, [facility]     Secondary Facilitator: CHELSEA Aldridge, [facility]

## 2023-12-04 ENCOUNTER — CLINICAL SUPPORT (OUTPATIENT)
Dept: BEHAVIORAL HEALTH | Facility: CLINIC | Age: 43
End: 2023-12-04
Payer: COMMERCIAL

## 2023-12-04 DIAGNOSIS — F41.1 GAD (GENERALIZED ANXIETY DISORDER): ICD-10-CM

## 2023-12-04 DIAGNOSIS — F33.1 MODERATE EPISODE OF RECURRENT MAJOR DEPRESSIVE DISORDER (MULTI): ICD-10-CM

## 2023-12-04 PROCEDURE — 90853 GROUP PSYCHOTHERAPY: CPT | Mod: U2,95
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
## 2023-12-05 ENCOUNTER — CLINICAL SUPPORT (OUTPATIENT)
Dept: BEHAVIORAL HEALTH | Facility: CLINIC | Age: 43
End: 2023-12-05
Payer: COMMERCIAL

## 2023-12-05 ENCOUNTER — SOCIAL WORK (OUTPATIENT)
Dept: BEHAVIORAL HEALTH | Facility: CLINIC | Age: 43
End: 2023-12-05
Payer: COMMERCIAL

## 2023-12-05 ENCOUNTER — DOCUMENTATION (OUTPATIENT)
Dept: BEHAVIORAL HEALTH | Facility: CLINIC | Age: 43
End: 2023-12-05

## 2023-12-05 DIAGNOSIS — F41.1 GAD (GENERALIZED ANXIETY DISORDER): ICD-10-CM

## 2023-12-05 DIAGNOSIS — F33.1 MODERATE EPISODE OF RECURRENT MAJOR DEPRESSIVE DISORDER (MULTI): ICD-10-CM

## 2023-12-05 PROCEDURE — 90791 PSYCH DIAGNOSTIC EVALUATION: CPT

## 2023-12-05 PROCEDURE — 90853 GROUP PSYCHOTHERAPY: CPT | Mod: U2,95

## 2023-12-06 ENCOUNTER — APPOINTMENT (OUTPATIENT)
Dept: BEHAVIORAL HEALTH | Facility: CLINIC | Age: 43
End: 2023-12-06
Payer: COMMERCIAL

## 2023-12-06 NOTE — PROGRESS NOTES
Virtual check-in with patient  12/5/2023  10:37 am  Grisel and I re-reviewed the Al-Anon meetings available in her area she can attend. She shared she had misplaced her notes on this and has not attended these meetings like we had previously discussed.   She shared her initial individual therapy appointment with Mackenzie FITZGERALD Is this afternoon at 1 pm. She provided me with verbal consent to speak with Mackenzie and give her some background on Grisel's mental health concerns and goals. She requested to continue her medication appointments with Keaton on an outpatient basis once she finishes IOP.   We discussed her discharge date and agreed on December 20th as her last day. She will be sending me return to work paperwork to complete by Friday and we will document her return to work date to be 12/22/23.     Kenya Spring, AGUSTIN-S, LICDC  Supervisor, [facility]

## 2023-12-06 NOTE — GROUP NOTE
Group Topic: Personal Responsibility   Group Date: 12/4/2023  Start Time: 10:00 AM  End Time: 11:00 AM  Facilitators: AGUSTIN Hoover; AGUSTIN Garza   Department: [facility]        Name: Allison Sky YOB: 1980   MR: [id number]      This was a video IOP group on a HIPAA compliant platform. All patients were provided with informed consent.   Date: 12/04/2023, Time: 10-11a, Number of Patients: 13, User Name: CHELSEA Linton, ATR, Number of Staff: 2  Group topic(s): Today's group topic was Personal Recovery. After a few group members shared about their SMART goals/weekends, we moved in a general conversation about what the term “recovery” means to you. During this discussion, group members shared about both the similarities and differences in others' recoveries. Grisel chose not to share about how her weekend or SMART goals went. She was attentive and engaged throughout the 10am hour.    Primary Facilitator: LETICIA Mcgregor  Supervised by CHELSEA Aldridge, [facility]     Secondary Facilitator: AGUSTIN Ordoñez

## 2023-12-06 NOTE — GROUP NOTE
Group Topic: Personal Responsibility   Group Date: 12/4/2023  Start Time: 11:00 AM  End Time: 12:00 PM  Facilitators: AGUSTIN Hoover; AGUSTIN Garza   [facility]        Name: Allison Sky YOB: 1980   MR: [id number]      This was a video IOP group on a HIPAA compliant platform. All patients were provided with informed consent.   Date: 12/04/2023, Time: 11a-12p, Number of Patients: 13, User Name: CHELSEA Linton, ATR, Number of Staff: 2  Group topic(s): Today's group topic was Personal Recovery. The group engaged in activity where they were asked to draw their recovery as a road and then place themselves on that road. Following the drawing, the group engaged in a variety of discussion questions that allowed for further processing of the topic. Grisel did not share her drawing but spoke about her recovery as being taking responsibility for her symptoms. She believes in the past she has not taken responsibility, and this has causes her and those around her to be further affected. By taking responsibility, she can gain some control over her feelings and emotions.     Primary Facilitator: LETICIA Mcgregor  Supervised by CHELSEA Aldridge, [facility]     Secondary Facilitator: AGUSTIN Ordoñez

## 2023-12-07 ENCOUNTER — CLINICAL SUPPORT (OUTPATIENT)
Dept: BEHAVIORAL HEALTH | Facility: CLINIC | Age: 43
End: 2023-12-07
Payer: COMMERCIAL

## 2023-12-07 DIAGNOSIS — F41.1 GAD (GENERALIZED ANXIETY DISORDER): ICD-10-CM

## 2023-12-07 DIAGNOSIS — F33.1 MODERATE EPISODE OF RECURRENT MAJOR DEPRESSIVE DISORDER (MULTI): ICD-10-CM

## 2023-12-07 PROCEDURE — 90853 GROUP PSYCHOTHERAPY: CPT | Mod: U2,95

## 2023-12-07 NOTE — GROUP NOTE
Group Topic: Goals   Group Date: 12/7/2023  Start Time:  9:00 AM  End Time: 10:00 AM  Facilitators: AGUSTIN Hoover   Department:  [facility]        Name: Allison Sky YOB: 1980   MR: [id number]      This was a video IOP group on a HIPAA compliant platform. All patients were provided with informed consent.   Date: 12/07/2023, Time: 9-10a, Number of Patients: 15, User Name: CHELSEA Linton, [facility], Number of Staff: 2  Group topic(s): SMART goal setting group today. Group members identified achievement goals, enjoyment goals, and social connection goals - as well as anticipated obstacles and coping skills. Prior to writing and discussing goals, the group spent some time identifying and sharing their emotions using a feeling wheel. Grisel set goals to watch TV in the living room, go to a party for an hour, see the [place] lights wither her boyfriend, and buy a cake for her son's birthday (today). She anticipates obstacles of anxiety, isolation, and negative self-talk. She plans to use the 5-4-3-2-1 method, putting her hand out the window, and praying.     Primary Facilitator: LETICIA Mcgregor  Supervised by CHELSEA Aldridge, [facility]     Secondary Facilitator: CHELSEA Aldridge, [facility]

## 2023-12-07 NOTE — GROUP NOTE
Group Topic: Coping Skills   Group Date: 12/7/2023  Start Time: 10:00 AM  End Time: 11:00 AM  Facilitators: AGUSTIN Hoover; AGUSTIN Garza   [facility]    This was a video IOP group on a HIPAA compliant program. All patients were provided with informed consent.     Date: 12/7/2023, Time: 10-11a, Number of Patients: 14, Username: [email or web address], Number of Staff: 2  Group Topic(s): coping styles. The group engaged in discussing the definition of coping skills and identified the difference between healthy coping and maladaptive coping. As a whole, patients brainstormed coping skills falling into five categories including self-soothing, distractions, opposite action, emotional awareness, and mindfulness. Examples of self-soothing were discussed.     Name: Allison Sky YOB: 1980   MR: [id number]      Grisel was on topic and attentive. Patient engaged fully and appropriately. Patient shared examples of self-soothing skills.   AGUSTIN Ordoñez    Secondary facilitator: Michele Yoder, CT  CT supervised by AGUSTIN Aldridge-S, [facility]
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
## 2023-12-07 NOTE — GROUP NOTE
Group Topic: Coping Skills   Group Date: 12/7/2023  Start Time: 11:00 AM  End Time: 12:00 PM  Facilitators: AGUSTIN Hoover; AGUSTIN Garza   [facility]    This was a video IOP group on a HIPAA compliant program. All patients were provided with informed consent.     Date: 12/7/2023, Time: 11a-12p, Number of Patients: [email or web address], Username: [email or web address], Number of Staff: 2  Group Topic(s): coping styles. The group continued discission on coping. As a whole, patients brainstormed coping skills falling into five categories including self-soothing, distractions, opposite action, emotional awareness, and mindfulness. Examples of distractions and opposite action were discussed.     Name: Allison Sky YOB: 1980   MR: [id number]      Grisel was on topic and attentive. Patient offered examples of coping via the chat and maintained nonverbal engagement throughout.   AGUSTIN Ordoñez    Secondary facilitator: LETICIA Mcgregor   CT supervised by Kenya Spring, AGUSTIN-S, [facility]

## 2023-12-08 ENCOUNTER — DOCUMENTATION (OUTPATIENT)
Dept: BEHAVIORAL HEALTH | Facility: CLINIC | Age: 43
End: 2023-12-08
Payer: COMMERCIAL

## 2023-12-08 NOTE — PROGRESS NOTES
INTENSIVE OUTPATIENT PROGRAM MULTIDISCIPLINARY  TREATMENT PLAN & PROGRESS NOTE     Patient: Cassia Sky  : 1980  Age: 43     Student: No  Treatment Team Date: 23 MRN#: [id number]    Attending Physician: Dr. MARY Pittman MD  Date of IOP Admission: 10/30/23   Ref by: Self   Week: 6                      Admission Scores:   DASS21: 43   NATY: 39 BDI: 42 PHQ-9: 17  MAST: 0 DAST: 0 MDQ: Positive    Current Risk Assessment:  Suicidal Risk:  None: X     Ideation:       Plan:      Intent:      SI Plan with plan contracts for safety:  Hx of harming self:        Homicidal Risk:  None:   X     Ideation:       Plan:      Intent:      HI Plan with means:  Hx of harming others:          Global Improvement  Clinical Global Impressions Rating Scale Of Illness:  5  1-No Illness Present   2-Minimal   3-Mild   4-Moderate   5-Marked X   6-Severe     7-Very Severe    Diagnoses & ICD-10 Codes  Primary Diagnosis & Code: Major Depressive Disorder, recurrent, moderate; F33.1  Secondary Diagnosis & Code: Generalized Anxiety Disorder; F41.1     Psychosocial & Environmental Stressors:   1. Work life   2. Family/home life   3. Ongoing mental health concerns     Patient's Treatment Goals:  Date Initiated:           Progress Update:               1.  Attend and actively participate in IOP _4_ days/week for 3 hours per day  10/30/2023   Good X      Fair      Poor  Unclear    2.  Attend weekly medication management sessions and maintain compliance of medications  10/30/2023     Good X      Fair      Poor  Unclear                                                    3.  Identify symptoms of depression and anxiety while developing coping plans  10/30/2023   Good X      Fair      Poor  Unclear                                4.  Increase self-esteem and gaining greater social support   10/30/2023  Good         Fair X   Poor  Unclear       Current medications:  See EMR chart       Progress note  __New to the IOP program, attending as  planned  __Compliant, Progressing & Improving - Needs more time in IOP therapy program   _X_Compliant, Progressing & Improving Planning Discharge   __Compliant, Not Progressing or Improving: Reason  __Non-Compliant, not progressing or improving: Plan  __Other:    Insurance & Benefits:  [facility] [place], [place] NETWORK, BENEFITS ARE BASED ON MEDICAL NECESSITY ONLY: Unlimited visits, covers 100% of the contracted rate after deductible has been met. 1 in 1 day.     Co-Pay per day: $0    DEDUCTIBLE        Single: / Family: /  Accumulation:  Single: /     Family:  /  CO- INSURANCE  Single:  Family:   Accumulation:  Single:      Family:   OUT OF POCKET  Single: 6600 Family: [id number]  Accumulation:  Single: 602.49 Family: [id number]     Total IOP Sessions completed & authorized to date:  21    Discharge plans have been discussed with patient & medication management provider on:   Reason for discharge:    ___Successfully completed IOP treatment:   ___Pt. decided to seek treatment elsewhere:   ___Dropped out or no show more than three times:  ___Benefits exhausted:   ___Other:    Discharge date:                     Discharge Prognosis: Excellent      Good     Fair     Poor    Follow up appointment with: Physician:   Follow up appointment with: Therapist:    Follow up Aftercare group?  Yes   Patient provided with Crisis Hotline phone number for suicide prevention? Yes     Discharge Scores: Not Completed    Emergency Treatment Plan Completed by patient: Yes__  No__          DASS21:   NATY:   BDI:   Treatment plan electronically signed by Treatment Team:   MARY KAY Spring, LPCC-S, LICROBERT, LEANDRO Abbott, AGUSTIN, MARY KAY Thibodeaux, PsDillon, MARY KAY Mckay, APRN-CNP

## 2023-12-11 ENCOUNTER — CLINICAL SUPPORT (OUTPATIENT)
Dept: BEHAVIORAL HEALTH | Facility: CLINIC | Age: 43
End: 2023-12-11
Payer: COMMERCIAL

## 2023-12-11 DIAGNOSIS — F41.1 GAD (GENERALIZED ANXIETY DISORDER): ICD-10-CM

## 2023-12-11 DIAGNOSIS — F33.1 MODERATE EPISODE OF RECURRENT MAJOR DEPRESSIVE DISORDER (MULTI): ICD-10-CM

## 2023-12-11 PROCEDURE — 90853 GROUP PSYCHOTHERAPY: CPT | Mod: U2,95

## 2023-12-11 NOTE — GROUP NOTE
Group Topic: Goals   Group Date: 12/11/2023  Start Time:  9:00 AM  End Time: 10:00 AM  Facilitators: AGUSTIN Garza; AGUSTIN Hoover   [facility]    Number of Participants: 13   Group Focus: activities of daily living skills, check in, daily focus, and goals  Treatment Modality: Behavior Modification Therapy, Cognitive Behavioral Therapy, and Patient-Centered Therapy  Interventions utilized were assignment, problem solving, and support  Purpose: coping skills, insight or knowledge, self-care, and relapse prevention strategies    Name: Allison Sky YOB: 1980   MR: [id number]    This was a video IOP group on a HIPAA compliant platform. All patients were provided with informed consent.   Date: 12/11/2023  Time: 9-10a, Number of Patients: 13, User Name: [email or web address], Number of Staff: 2    Group topic(s): New patient welcome and SMART goal weekend review    Group members introduced themselves to new group members. Group members then took turns sharing progress and challenges they faced this weekend related to their identified SMART goals. The group then finished by identifying a weekly intention.    Allison was present on camera and appeared attentive. She did not have an opportunity to share her weekend SMART goals during this hour.      Primary Facilitator AGUSTIN Aldridge-S, WALDEMAR  Secondary Facilitator LETICIA Mcgregor  Supervisor AGUSTIN Aldridge-S, [facility]

## 2023-12-11 NOTE — GROUP NOTE
Group Topic: Coping Skills   Group Date: 12/5/2023  Start Time: 11:00 AM  End Time: 12:00 PM  Facilitators: Betsy Thibodeaux PsyD   Department:  [facility]    Number of Participants: 11   Group Focus: coping skills  Treatment Modality: Cognitive Behavioral Therapy  Interventions utilized were exploration and patient education  Purpose: coping skills and other: values    This was a video IOP group on a HIPAA compliant platform. All patients were provided with informed consent.  User Name: [email or web address]  Number of Staff: 2  Group topic: Values  Group members identified their own personal values and completed questions related to values exploration. They discussed what is most important to them, why these things are important and how their values have changed over time.   Betsy Thibodeaux Psy.D.  Secondary facilitator: LETICIA Mcgregor  Supervisor [facility], ATR      Name: Allison Sky YOB: 1980   MR: [id number]      Pt was present and actively listened to discussion.

## 2023-12-11 NOTE — GROUP NOTE
Group Topic: Coping Skills   Group Date: 12/5/2023  Start Time: 10:00 AM  End Time: 11:00 AM  Facilitators: Betsy Thibodeaux PsyD   Department:  [facility]    Number of Participants: 13   Group Focus: coping skills  Treatment Modality: Cognitive Behavioral Therapy  Interventions utilized were exploration and patient education  Purpose: coping skills and other: Values    This was a video IOP group on a HIPAA compliant platform. All patients were provided with informed consent.  User Name: [email or web address]  Number of Staff: 2  Group topic: Values  Group members reviewed topic from last week related to self-talk. Participants then received education on values. They explored what values mean to them and defined the concept. They began exploration into categories of values and how these are present in their personal lives.   Betsy Thibodeaux Psy.D.  Secondary facilitator: LETICIA Mcgregor  Supervisor Armida Roy, [facility]-S, ATR    Name: Allison Sky YOB: 1980   MR: [id number]      Pt was present and actively listened to discussion.

## 2023-12-12 ENCOUNTER — APPOINTMENT (OUTPATIENT)
Dept: BEHAVIORAL HEALTH | Facility: CLINIC | Age: 43
End: 2023-12-12
Payer: COMMERCIAL

## 2023-12-12 ENCOUNTER — TELEMEDICINE (OUTPATIENT)
Dept: BEHAVIORAL HEALTH | Facility: CLINIC | Age: 43
End: 2023-12-12
Payer: COMMERCIAL

## 2023-12-12 DIAGNOSIS — F42.4 EXCORIATION (SKIN-PICKING) DISORDER: ICD-10-CM

## 2023-12-12 DIAGNOSIS — F33.1 MODERATE EPISODE OF RECURRENT MAJOR DEPRESSIVE DISORDER (MULTI): ICD-10-CM

## 2023-12-12 DIAGNOSIS — F41.1 GAD (GENERALIZED ANXIETY DISORDER): Primary | ICD-10-CM

## 2023-12-12 DIAGNOSIS — Z79.899 MEDICATION MANAGEMENT: ICD-10-CM

## 2023-12-12 DIAGNOSIS — E55.9 VITAMIN D DEFICIENCY: ICD-10-CM

## 2023-12-12 PROCEDURE — 99214 OFFICE O/P EST MOD 30 MIN: CPT | Performed by: NURSE PRACTITIONER

## 2023-12-12 ASSESSMENT — ENCOUNTER SYMPTOMS
CONSTITUTIONAL NEGATIVE: 1
NAUSEA: 1
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
## 2023-12-12 NOTE — GROUP NOTE
Group Topic: Feeling Awareness/Expression   Group Date: 12/11/2023  Start Time: 11:00 AM  End Time: 12:00 PM  Facilitators: AGUSTIN Hoover; AGUSTIN Garza   Department: [facility]        Name: Allison Sky YOB: 1980   MR: [id number]      This was a video IOP group on a HIPAA compliant platform. All patients were provided with informed consent.   Date: 12/10/2023, Time: 11a-12p, Number of Patients: 15, User Name: CHELSEA Linton, ATR, Number of Staff: 2  Group topic(s): Today's group topic was Breaking the Cycle. Group members engaged in conversations about how past generation's view(ed) mental health, the impacts these views had on the group, and the group's own views of mental health. Following these discussions, group member made a timeline of their family, labeled with the dominating thoughts on mental health of that time. The group was then asked to draw a line where they believe the change in views was made. Grisel was attentive and engaged during the 11am hour but did not participate.    Primary Facilitator: LETICIA Mcgregor  Supervised by CHELSEA Aldridge, [facility]     Secondary Facilitator: AGUSTIN Ordoñez

## 2023-12-13 ENCOUNTER — CLINICAL SUPPORT (OUTPATIENT)
Dept: BEHAVIORAL HEALTH | Facility: CLINIC | Age: 43
End: 2023-12-13
Payer: COMMERCIAL

## 2023-12-13 DIAGNOSIS — F42.4 EXCORIATION (SKIN-PICKING) DISORDER: ICD-10-CM

## 2023-12-13 DIAGNOSIS — F33.1 MODERATE EPISODE OF RECURRENT MAJOR DEPRESSIVE DISORDER (MULTI): Primary | ICD-10-CM

## 2023-12-13 DIAGNOSIS — F41.1 GAD (GENERALIZED ANXIETY DISORDER): ICD-10-CM

## 2023-12-13 PROCEDURE — 90853 GROUP PSYCHOTHERAPY: CPT | Mod: U2,95

## 2023-12-13 NOTE — GROUP NOTE
Group Topic: Leisure Skills   Group Date: 12/13/2023  Start Time:  9:00 AM  End Time: 10:00 AM  Facilitators: AGUSTIN Hoover; AGUSTIN Garza   Department: [facility]        Name: Allison Sky YOB: 1980   MR: [id number]      This was a video IOP group on a HIPAA compliant platform. All patients were provided with informed consent.     Date: 12/13/2023, Time: 9-10a, Number of Patients: 11, User Name: [email or web address], Number of Staff: 2  Group topic(s): check in. The group engaged in a general check in by sharing how their week is going and exploring the goos and the bad of the week thus far. Group discussion followed. Grisel was present and engaged. Patient shared really struggling with physical symptoms and being unsure where these symptoms are coming from. Patient shared some feelings of invalidation around doctors saying her symptoms are related to anxiety. Patient was offered support and feedback from her peers.   AGUSTIN Ordoñez    Secondary facilitator: Michele Yoder, CT  CT supervised by Kenya Spring, AGUSTIN-S, [facility]

## 2023-12-14 ENCOUNTER — CLINICAL SUPPORT (OUTPATIENT)
Dept: BEHAVIORAL HEALTH | Facility: CLINIC | Age: 43
End: 2023-12-14
Payer: COMMERCIAL

## 2023-12-14 ENCOUNTER — DOCUMENTATION (OUTPATIENT)
Dept: BEHAVIORAL HEALTH | Facility: CLINIC | Age: 43
End: 2023-12-14
Payer: COMMERCIAL

## 2023-12-14 DIAGNOSIS — F41.1 GAD (GENERALIZED ANXIETY DISORDER): ICD-10-CM

## 2023-12-14 DIAGNOSIS — F42.4 EXCORIATION (SKIN-PICKING) DISORDER: ICD-10-CM

## 2023-12-14 DIAGNOSIS — F33.1 MODERATE EPISODE OF RECURRENT MAJOR DEPRESSIVE DISORDER (MULTI): Primary | ICD-10-CM

## 2023-12-14 PROCEDURE — 90853 GROUP PSYCHOTHERAPY: CPT | Mod: U2,95

## 2023-12-14 NOTE — GROUP NOTE
Group Topic: Coping Skills   Group Date: 12/14/2023  Start Time: 11:00 AM  End Time: 12:00 PM  Facilitators: AGUSTIN Hoover; AGUSTIN Garza   [facility]    This was a video IOP group on a HIPAA compliant platform. All patients were provided with informed consent.     Date: 12/14/2023, Time: 11a-12p, Number of Patients: 14, User Name: [email or web address],  Number of Staff: 2  Group topic(s): coping plans & cards. The group continued conversation on coping and patients then engaged in creating coping cards which allowed them to identify stressors and/or symptoms they experience. On the other side of their card they identified a coping plan. Group discussion followed.      Name: Allison Sky YOB: 1980   MR: [id number]      Grisel returned to group and was present and attentive. Patient appeared to follow along to the best of her ability and made sure to let staff know when feeling challenged to focus.   AGUSTIN Ordoñez    Secondary facilitator: Michele Yoder, CT  CT supervised by Kenya Spring, AGUSTIN-S, [facility]

## 2023-12-14 NOTE — GROUP NOTE
Group Topic: Feeling Awareness/Expression   Group Date: 12/13/2023  Start Time: 11:00 AM  End Time: 12:00 PM  Facilitators: AGUSTIN Garza; AGUSTIN Hoover   [facility]    Number of Participants: 13   Group Focus: feeling awareness/expression  Treatment Modality: Cognitive Behavioral Therapy, Patient-Centered Therapy, and Other: Narrative  Interventions utilized were exploration, group exercise, and support  Purpose: feelings and self-worth    Name: Allison Sky YOB: 1980   MR: [id number]    This was a video IOP group on a HIPAA compliant platform. All patients were provided with informed consent.     Date:12/13/2023, Time:11:00a-12:00pm , Number of Patients: 13, User Name: [email or web address],  Number of Staff: 2    Group Topic: Mental health diagnosis processing, separation from self and self-esteem building.    Group members continued with the exercise by writing a second letter to themselves. They were given the prompt to write to themselves in a kind, gentle way and to focus on their strengths, resilience and capabilities. Group members then processed their letters and supported one another.     Grisel was present on camera and observed engaged in the writing activity. She did not volunteer to share her letter out loud to the group.    Provider Signature: CHELSEA Aldridge, WALDEMAR  Secondary facilitator(s): LETICIA Mcgregor  Supervisor CHELSEA Aldridge, WALDEMAR
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
## 2023-12-14 NOTE — GROUP NOTE
Group Topic: Feeling Awareness/Expression   Group Date: 12/13/2023  Start Time: 10:00 AM  End Time: 11:00 AM  Facilitators: AGUSTIN Garza; AGUSTIN Hoover   [facility]    Number of Participants: 13   Group Focus: feeling awareness/expression  Treatment Modality: Cognitive Behavioral Therapy and Patient-Centered Therapy  Interventions utilized were exploration and group exercise  Purpose: feelings and insight or knowledge    Name: Allison Sky YOB: 1980   MR: [id number]    This was a video IOP group on a HIPAA compliant platform. All patients were provided with informed consent.     Date:11/13/2023, Time:10:00a-11:00a, Number of Patients: 13 , User Name: [email or web address],  Number of Staff: 2    Group topic(s):  Expression of thoughts and feelings about mental health conditions and symptoms.   Group members took time to share how they feel about having mental health issues. Group members were prompted to write a letter to their mental health condition or symptoms of choice. Group members then took time to read the letters and process content.     Allison was present on camera. She was observed writing during the group exercise and listening as others shared their letters. She did not volunteer to read her letter to the group.     Provider Signature: CHELSEA Aldridge, WALDEMAR  Secondary facilitator(s): LETICIA Mcgregor  Supervisor CHELSEA Aldridge, [facility] Center[facility]

## 2023-12-14 NOTE — PROGRESS NOTES
INTENSIVE OUTPATIENT PROGRAM MULTIDISCIPLINARY  TREATMENT PLAN & PROGRESS NOTE     Patient: Cassia Sky  : 1980  Age: 43     Student: No  Treatment Team Date: 23 MRN#: [id number]    Attending Physician: Dr. MARY Pittman MD  Date of IOP Admission: 10/30/23   Ref by: Self   Week: 7                      Admission Scores:   DASS21: 43   NATY: 39 BDI: 42 PHQ-9: 17  MAST: 0 DAST: 0 MDQ: Positive    Current Risk Assessment:  Suicidal Risk:  None: X     Ideation:       Plan:      Intent:      SI Plan with plan contracts for safety:  Hx of harming self:        Homicidal Risk:  None:   X     Ideation:       Plan:      Intent:      HI Plan with means:  Hx of harming others:          Global Improvement  Clinical Global Impressions Rating Scale Of Illness:  5  1-No Illness Present   2-Minimal   3-Mild   4-Moderate   5-Marked X   6-Severe     7-Very Severe    Diagnoses & ICD-10 Codes  Primary Diagnosis & Code: Major Depressive Disorder, recurrent, moderate; F33.1  Secondary Diagnosis & Code: Generalized Anxiety Disorder; F41.1     Psychosocial & Environmental Stressors:   1. Work life   2. Family/home life   3. Ongoing mental health concerns     Patient's Treatment Goals:  Date Initiated:           Progress Update:               1.  Attend and actively participate in IOP _4_ days/week for 3 hours per day  10/30/2023   Good X      Fair      Poor  Unclear    2.  Attend weekly medication management sessions and maintain compliance of medications  10/30/2023     Good X      Fair      Poor  Unclear                                                    3.  Identify symptoms of depression and anxiety while developing coping plans  10/30/2023   Good X      Fair      Poor  Unclear                                4.  Increase self-esteem and gaining greater social support   10/30/2023  Good         Fair X   Poor  Unclear       Current medications:  See EMR chart       Progress note  __New to the IOP program, attending as  planned  __Compliant, Progressing & Improving - Needs more time in IOP therapy program   _X_Compliant, Progressing & Improving Planning Discharge   __Compliant, Not Progressing or Improving: Reason  __Non-Compliant, not progressing or improving: Plan  __Other:    Insurance & Benefits:  [employer] [place], IN NETWORK, BENEFITS ARE BASED ON MEDICAL NECESSITY ONLY: Unlimited visits, covers 100% of the contracted rate after deductible has been met. 1 in 1 day.     Co-Pay per day: $0    DEDUCTIBLE        Single: / Family: /  Accumulation:  Single: /     Family:  /  CO- INSURANCE  Single:  Family:   Accumulation:  Single:      Family:   OUT OF POCKET  Single: 6600 Family: [id number]  Accumulation:  Single: 602.49 Family: [id number]     Total IOP Sessions completed & authorized to date:  24    Discharge plans have been discussed with patient & medication management provider on:   Reason for discharge:    ___Successfully completed IOP treatment:   ___Pt. decided to seek treatment elsewhere:   ___Dropped out or no show more than three times:  ___Benefits exhausted:   ___Other:    Discharge date:                     Discharge Prognosis: Excellent      Good     Fair     Poor    Follow up appointment with: Physician:   Follow up appointment with: Therapist:    Follow up Aftercare group?  Yes   Patient provided with Crisis Hotline phone number for suicide prevention? Yes     Discharge Scores: Not Completed    Emergency Treatment Plan Completed by patient: Yes__  No__          DASS21:   NATY:   BDI:   Treatment plan electronically signed by Treatment Team:   MARY KAY Spring, LPCC-S, LICROBERT, LEANDRO Abbott, AGUSTIN, MARY KAY Thibodeaux, PsDillon, MARY KAY Mckay, APRN-CNP
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
## 2023-12-15 ENCOUNTER — SOCIAL WORK (OUTPATIENT)
Dept: BEHAVIORAL HEALTH | Facility: CLINIC | Age: 43
End: 2023-12-15
Payer: COMMERCIAL

## 2023-12-15 DIAGNOSIS — F41.1 GAD (GENERALIZED ANXIETY DISORDER): ICD-10-CM

## 2023-12-15 DIAGNOSIS — F33.1 MODERATE EPISODE OF RECURRENT MAJOR DEPRESSIVE DISORDER (MULTI): ICD-10-CM

## 2023-12-15 PROCEDURE — 90837 PSYTX W PT 60 MINUTES: CPT
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
## 2023-12-16 DIAGNOSIS — E88.819 INSULIN RESISTANCE: ICD-10-CM

## 2023-12-16 DIAGNOSIS — E89.0 POSTOPERATIVE HYPOTHYROIDISM: ICD-10-CM

## 2023-12-16 DIAGNOSIS — E28.2 PCOS (POLYCYSTIC OVARIAN SYNDROME): ICD-10-CM

## 2023-12-16 LAB
ANION GAP SERPL CALCULATED.3IONS-SCNC: 11 MEQ/L (ref 9–15)
BUN SERPL-MCNC: 9 MG/DL (ref 6–20)
CALCIUM SERPL-MCNC: 9.1 MG/DL (ref 8.5–9.9)
CHLORIDE SERPL-SCNC: 103 MEQ/L (ref 95–107)
CHOLEST SERPL-MCNC: 155 MG/DL (ref 0–199)
CO2 SERPL-SCNC: 24 MEQ/L (ref 20–31)
CREAT SERPL-MCNC: 0.68 MG/DL (ref 0.5–0.9)
GLUCOSE SERPL-MCNC: 98 MG/DL (ref 70–99)
HBA1C MFR BLD: 5.8 % (ref 4.8–5.9)
HDLC SERPL-MCNC: 66 MG/DL (ref 40–59)
LDLC SERPL CALC-MCNC: 75 MG/DL (ref 0–129)
POTASSIUM SERPL-SCNC: 3.8 MEQ/L (ref 3.4–4.9)
SODIUM SERPL-SCNC: 138 MEQ/L (ref 135–144)
T4 FREE SERPL-MCNC: 1.11 NG/DL (ref 0.84–1.68)
TRIGL SERPL-MCNC: 68 MG/DL (ref 0–150)
TSH REFLEX: 0.76 UIU/ML (ref 0.44–3.86)

## 2023-12-18 ENCOUNTER — CLINICAL SUPPORT (OUTPATIENT)
Dept: BEHAVIORAL HEALTH | Facility: CLINIC | Age: 43
End: 2023-12-18
Payer: COMMERCIAL

## 2023-12-18 DIAGNOSIS — F33.1 MODERATE EPISODE OF RECURRENT MAJOR DEPRESSIVE DISORDER (MULTI): ICD-10-CM

## 2023-12-18 DIAGNOSIS — F41.1 GAD (GENERALIZED ANXIETY DISORDER): ICD-10-CM

## 2023-12-18 PROCEDURE — 90853 GROUP PSYCHOTHERAPY: CPT | Mod: U2,95

## 2023-12-18 NOTE — GROUP NOTE
Group Topic: Goals   Group Date: 12/18/2023  Start Time:  9:00 AM  End Time: 10:00 AM  Facilitators: AGUSTIN Garza; AGUSTIN Hoover   [facility]    Number of Participants: 10   Group Focus: activities of daily living skills, check in, coping skills, and goals  Treatment Modality: Behavior Modification Therapy, Cognitive Behavioral Therapy, and Patient-Centered Therapy  Interventions utilized were exploration, problem solving, and support  Purpose: coping skills, self-care, relapse prevention strategies, and trigger / craving management    Name: Allison Sky YOB: 1980   MR: [id number]    This was a video IOP group on a HIPAA compliant platform. All patients were provided with informed consent.   Date: 12/18/2023  Time: 9-10a, Number of Patients: 10, User Name: [email or web address], Number of Staff: 2    Group topic(s): Weekend SMART goal review    Group members took turns sharing progress and challenges they faced this weekend related to their identified SMART goals.     Allison was present on camera and appeared attentive. She shared being highly active and able to drive on Saturday and believing she may have overdone it. By Sunday she felt paralyzed again and unable to drive. She shared wanting to go to an Al-An[employer] meeting, driving there but not having the courage to go inside. She noted taking her son for blood work for a surgery he is having this Friday. She also had blood work done for herself while she was there and signed them both up for a diabetes training. Allison was commended for her efforts and how much she was able to accomplish to address her sons' and her own ongoing medical needs.     Primary Facilitator AGUSTIN Aldridge-S, [facility]  Secondary Facilitator Michele Yoder, CT  Supervisor AGUSTIN Aldridge-S, [facility]

## 2023-12-19 ENCOUNTER — TELEMEDICINE (OUTPATIENT)
Dept: BEHAVIORAL HEALTH | Facility: CLINIC | Age: 43
End: 2023-12-19
Payer: COMMERCIAL

## 2023-12-19 ENCOUNTER — CLINICAL SUPPORT (OUTPATIENT)
Dept: BEHAVIORAL HEALTH | Facility: CLINIC | Age: 43
End: 2023-12-19
Payer: COMMERCIAL

## 2023-12-19 DIAGNOSIS — Z79.899 MEDICATION MANAGEMENT: ICD-10-CM

## 2023-12-19 DIAGNOSIS — E55.9 VITAMIN D DEFICIENCY: ICD-10-CM

## 2023-12-19 DIAGNOSIS — F33.1 MODERATE EPISODE OF RECURRENT MAJOR DEPRESSIVE DISORDER (MULTI): ICD-10-CM

## 2023-12-19 DIAGNOSIS — F42.4 EXCORIATION (SKIN-PICKING) DISORDER: ICD-10-CM

## 2023-12-19 DIAGNOSIS — F41.1 GAD (GENERALIZED ANXIETY DISORDER): Primary | ICD-10-CM

## 2023-12-19 ASSESSMENT — ENCOUNTER SYMPTOMS
NAUSEA: 1
CONSTITUTIONAL NEGATIVE: 1
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
## 2023-12-20 ENCOUNTER — CLINICAL SUPPORT (OUTPATIENT)
Dept: BEHAVIORAL HEALTH | Facility: CLINIC | Age: 43
End: 2023-12-20
Payer: COMMERCIAL

## 2023-12-20 ENCOUNTER — SOCIAL WORK (OUTPATIENT)
Dept: BEHAVIORAL HEALTH | Facility: CLINIC | Age: 43
End: 2023-12-20
Payer: COMMERCIAL

## 2023-12-20 DIAGNOSIS — F41.1 GAD (GENERALIZED ANXIETY DISORDER): ICD-10-CM

## 2023-12-20 DIAGNOSIS — F33.1 MODERATE EPISODE OF RECURRENT MAJOR DEPRESSIVE DISORDER (MULTI): ICD-10-CM

## 2023-12-20 PROCEDURE — 90853 GROUP PSYCHOTHERAPY: CPT | Mod: U2,95

## 2023-12-20 PROCEDURE — 90837 PSYTX W PT 60 MINUTES: CPT

## 2023-12-20 NOTE — PROGRESS NOTES
"Therapy Session  Progress Note    Type of Interaction: Virtual    Start Time: 2:00 pm  End Time: 2:55 pm    Appointment: Scheduled    Reason for Visit:  Anxiety, Depression    Interval History / Patient Symptoms: The patient said she still struggles with anxiety, and she had a panic attack when she went to the store. However, she had another time she went to the store to get things for her kids and she did ok. The patient identified her values and the importance of spending time with her kids and providing for her kids. She discussed ways she can take \"baby steps\" to try to cope with her anxiety. The patient discussed several coping strategies she has learned in IOP. She has difficulty using them when she is anxious but is willing to practice them at times she is calm.    Interventions Provided: Develop Coping Strategies, Homework F/U, and CBT    Progress Made: Minimum. The patient identified new coping strategies but has difficulty using them when she feels anxious.    Response to Intervention: The patient was receptive to the interventions provided.    Plan: The patient will work on practicing box breathing and the 5-4-3-2-1 exercise. She will also remind herself that \"I am doing the best I can\".    Follow Up / Next Appointment:  Follow up in one week.      NICK Orozco, MACO      "

## 2023-12-20 NOTE — GROUP NOTE
Group Topic: Leisure Skills   Group Date: 12/18/2023  Start Time: 11:00 AM  End Time: 12:00 PM  Facilitators: AGUSTIN Hoover; AGUSTIN Garza   Department: [facility]        Name: Allison Sky YOB: 1980   MR: [id number]      This was a video IOP group on a HIPAA compliant platform. All patients were provided with informed consent.   Date: 12/18/2023, Time: 11a-12p, Number of Patients: 12, User Name: CHELSEA Linton, ATR, Number of Staff: 2  Group topic(s): Today's group topic was Sleep Hygiene. During the second hour of group, we finished reviewing the common sleep hygiene behaviors PDF. The group then took some time to determine what their biggest obstacle to sleeping is, and any replacement behaviors might be. Lastly, the group completed a DBT chain analysis on their sleep problem, which they later shared. Grisel spoke about how when she feels overwhelmed, oftentimes she will take naps during the day. When she does this, her circadian rhythm is thrown-off and she often cannot fall asleep later that night. She is open to finding new coping mechanism other than sleeping.     Primary Facilitator: Michele Yoder CT  Supervised by CHELSEA Aldridge, [facility]     Secondary Facilitator: CHELSEA Aldridge, [facility]

## 2023-12-20 NOTE — GROUP NOTE
Group Topic: Feeling Awareness/Expression   Group Date: 12/20/2023  Start Time:  9:00 AM  End Time: 10:00 AM  Facilitators: AGUSTIN Hoover; AGUSTIN Garza   [facility]    This was a video IOP group on a HIPAA compliant platform. All patients were provided with informed consent.     Date: 12/20/2023, Time: 9-10a, Number of Patients: 10, User Name: [email or web address],  Number of Staff: 2  Group topic(s): юлия, thorn, bud reflection. The group engaged in exploring a reflection of their week thus far by identifying their юлия (a positive, a highlight or a success), thorn (a challenge or something you can use more support with) and a bud (new idea or something you're looking forward to knowing or understanding more). Group discussion followed.      Name: Allison Sky YOB: 1980   MR: [id number]      Allison was on topic and present. Patient appeared to be following along and engaging in written reflection. Patient did not verbally share yet maintained nonverbal engagement throughout.   AGUSTIN Ordoñez    Secondary facilitator: Michele Yoder, CT  CT supervised by AGUSTIN Aldridge-S, [facility]
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
## 2023-12-21 ENCOUNTER — CLINICAL SUPPORT (OUTPATIENT)
Dept: BEHAVIORAL HEALTH | Facility: CLINIC | Age: 43
End: 2023-12-21
Payer: COMMERCIAL

## 2023-12-21 DIAGNOSIS — F41.1 GAD (GENERALIZED ANXIETY DISORDER): ICD-10-CM

## 2023-12-21 DIAGNOSIS — F33.1 MODERATE EPISODE OF RECURRENT MAJOR DEPRESSIVE DISORDER (MULTI): ICD-10-CM

## 2023-12-21 PROCEDURE — 90853 GROUP PSYCHOTHERAPY: CPT | Mod: U2,95

## 2023-12-21 NOTE — GROUP NOTE
Group Topic: Feeling Awareness/Expression   Group Date: 12/21/2023  Start Time: 10:00 AM  End Time: 11:00 AM  Facilitators: AGUSTIN Hoover; AGUSTIN Garza   Department: [facility]    This was a video IOP group on a HIPAA compliant program. All patients were provided with informed consent.     Date: 12/21/2023, Time: 10-11a, Number of Patients: 12, Username: [email or web address], Number of Staff: 2  Group Topic(s): symptom identification. The group explored the definition of symptoms and how symptoms can present themselves physically, mentally, and emotionally. The group worked through brainstorming mild, moderate, and severe depressive symptoms they experienced. Anxiety and manic symptoms will be discussed in later group.      Name: Allison Sky YOB: 1980   MR: [id number]      Grisel was on topic and participative. Patient followed along appropriately and maintained nonverbal engagement throughout.   AGUSTIN Ordoñez    Secondary facilitator: LETICIA Mcgregor  CT supervised by AGUSTIN Aldridge-S, [facility]

## 2023-12-21 NOTE — GROUP NOTE
Group Topic: Goals   Group Date: 12/21/2023  Start Time:  9:00 AM  End Time: 10:00 AM  Facilitators: AGUSTIN Hoover; AGUSTIN Garza   Department: [facility]        Name: Allison Sky YOB: 1980   MR: [id number]      This was a video IOP group on a HIPAA compliant platform. All patients were provided with informed consent.   Date: 12/21/2023, Time: 9-10a, Number of Patients: 12, User Name: CHELSEA Linton, ATR, Number of Staff: 2  Group topic(s): SMART goal setting group today. Group members identified achievement goals, enjoyment goals, and social connection goals - as well as anticipated obstacles and coping skills. Prior to writing and discussing goals, the group spent some time identifying and sharing their emotions using a feeling wheel. Grisel set goals to do her hair, wear matching pajamas with her kids, watch movies, go to [employer], host Carson roberto, and watch football. She anticipates obstacles of anxiety and ruminating. She plans to use journaling and breathing as coping mechanisms.    Primary Facilitator: LETICIA Mcgregor  Supervised by CHELSEA Aldridge, [facility]     Secondary Facilitator: CHELSEA Aldridge, [facility]

## 2023-12-21 NOTE — GROUP NOTE
Group Topic: Coping Skills   Group Date: 12/20/2023  Start Time: 11:00 AM  End Time: 12:00 PM  Facilitators: AGUSTIN Garza; AGUSTIN Hoover   [facility]    Number of Participants: 10   Group Focus: coping skills  Treatment Modality: Cognitive Behavioral Therapy and Patient-Centered Therapy  Interventions utilized were exploration and group exercise  Purpose: coping skills and insight or knowledge    Name: Allison Sky YOB: 1980   MR: [id number]    This was a video IOP group on a HIPAA compliant platform. All patients were provided with informed consent.     Date:12/20/23, Time:11:00 am - 12:00 pm , Number of Patients: 10, User Name: [email or web address],  Number of Staff: 2  Group topic(s): Protective Factors Continued  Group members were prompted to draw a suit of armor and include their protective factors as part of their armor. Group members then shared their sketches, provided feedback to one another and processed the activity together.   Katrin was present on camera and was observed sketching as prompted. She did not share her sketch with the group but was attentive as others shared theirs.     Provider Signature: AGUSTIN Aldridge-S, [facility]  Secondary facilitator(s): LETICIA Mcgregor  Supervisor CHELSEA Aldridge, [facility]

## 2023-12-21 NOTE — GROUP NOTE
Group Topic: Coping Skills   Group Date: 12/20/2023  Start Time: 10:00 AM  End Time: 11:00 AM  Facilitators: AGUSTIN Garza; AGUSTIN Hoover   [facility]    Number of Participants: 10   Group Focus: coping skills  Treatment Modality: Cognitive Behavioral Therapy and Patient-Centered Therapy  Interventions utilized were exploration and group exercise  Purpose: coping skills and insight or knowledge    Name: Cassia Sky YOB: 1980   MR: [id number]    This was a video IOP group on a HIPAA compliant platform. All patients were provided with informed consent.     Date:12/20/23, Time:10:00 - 11:00 am , Number of Patients: 10, User Name: [email or web address],  Number of Staff: 2  Group topic(s): Protective Factors  Group members defined what protective factors are and then rated the level of protection they have in 6 categories; healthy thinking, support, self-esteem, physical health, coping skills and sense of purpose. The group explored areas that need to be strengthened and protective factors that have been beneficial for extended periods of time.   Cassia was present on camera and was observed participating in the activity. She was attentive as others shared factors they wished were stronger and identified others that are and have been beneficial for quite some time.     Provider Signature: CHELSEA Aldridge, [facility]  Secondary facilitator(s): LETICIA Mcgregor  Supervisor CHELSEA Aldridge, [facility]

## 2023-12-21 NOTE — GROUP NOTE
Group Topic: Feeling Awareness/Expression   Group Date: 12/21/2023  Start Time: 11:00 AM  End Time: 12:00 PM  Facilitators: AGUSTIN Hoover; AGUSTIN Garza   Department: [facility]    This was a video IOP group on a HIPAA compliant program. All patients were provided with informed consent.     Date: 12/21/23, Time: 11a-12p, Number of Patients: 12, Username: [email or web address], Number of Staff: 2  Group Topic(s): symptom identification. The group explored the definition of symptoms and how symptoms can present themselves physically, mentally, and emotionally. The group worked through brainstorming mild, moderate, and severe symptoms of anxiety and carlo.      Name: Allison Sky YOB: 1980   MR: [id number]      Grisel was on topic and participative. Patient maintained engagement throughout group. Patient identified examples of anxiety symptoms including elevated body temperature and a lump in her throat.   AGUSTIN Ordoñez    Secondary facilitator: LETICIA Mcgregor  CT supervised by AGUSTIN Aldridge-S, [facility]

## 2023-12-22 ENCOUNTER — DOCUMENTATION (OUTPATIENT)
Dept: BEHAVIORAL HEALTH | Facility: CLINIC | Age: 43
End: 2023-12-22
Payer: COMMERCIAL

## 2023-12-22 NOTE — PROGRESS NOTES
INTENSIVE OUTPATIENT PROGRAM MULTIDISCIPLINARY  TREATMENT PLAN & PROGRESS NOTE     Patient: Cassia Sky  : 1980  Age: 43     Student: No  Treatment Team Date: 23 MRN#: [id number]    Attending Physician: Dr. MARY Pittman MD  Date of IOP Admission: 10/30/23   Ref by: Self   Week: 8                     Admission Scores:   DASS21: 43   NATY: 39 BDI: 42 PHQ-9: 17  MAST: 0 DAST: 0 MDQ: Positive    Current Risk Assessment:  Suicidal Risk:  None: X     Ideation:       Plan:      Intent:      SI Plan with plan contracts for safety:  Hx of harming self:        Homicidal Risk:  None:   X     Ideation:       Plan:      Intent:      HI Plan with means:  Hx of harming others:          Global Improvement  Clinical Global Impressions Rating Scale Of Illness:  5  1-No Illness Present   2-Minimal   3-Mild   4-Moderate   5-Marked X   6-Severe     7-Very Severe    Diagnoses & ICD-10 Codes  Primary Diagnosis & Code: Major Depressive Disorder, recurrent, moderate; F33.1  Secondary Diagnosis & Code: Generalized Anxiety Disorder; F41.1     Psychosocial & Environmental Stressors:   1. Work life   2. Family/home life   3. Ongoing mental health concerns     Patient's Treatment Goals:  Date Initiated:           Progress Update:               1.  Attend and actively participate in IOP _4_ days/week for 3 hours per day  10/30/2023   Good X      Fair      Poor  Unclear    2.  Attend weekly medication management sessions and maintain compliance of medications  10/30/2023     Good X      Fair      Poor  Unclear                                                    3.  Identify symptoms of depression and anxiety while developing coping plans  10/30/2023   Good X      Fair      Poor  Unclear                                4.  Increase self-esteem and gaining greater social support   10/30/2023  Good         Fair X   Poor  Unclear       Current medications:  See EMR chart       Progress note  __New to the IOP program, attending as  planned  __Compliant, Progressing & Improving - Needs more time in IOP therapy program   _X_Compliant, Progressing & Improving Planning Discharge   __Compliant, Not Progressing or Improving: Reason  __Non-Compliant, not progressing or improving: Plan  __Other:    Insurance & Benefits:  [employer] [place], IN NETWORK, BENEFITS ARE BASED ON MEDICAL NECESSITY ONLY: Unlimited visits, covers 100% of the contracted rate after deductible has been met. 1 in 1 day.     Co-Pay per day: $0    DEDUCTIBLE        Single: / Family: /  Accumulation:  Single: /     Family:  /  CO- INSURANCE  Single:  Family:   Accumulation:  Single:      Family:   OUT OF POCKET  Single: 6600 Family: [id number]  Accumulation:  Single: 602.49 Family: [id number]     Total IOP Sessions completed & authorized to date:  27    Discharge plans have been discussed with patient & medication management provider on:   Reason for discharge:    ___Successfully completed IOP treatment:   ___Pt. decided to seek treatment elsewhere:   ___Dropped out or no show more than three times:  ___Benefits exhausted:   ___Other:    Discharge date:                     Discharge Prognosis: Excellent      Good     Fair     Poor    Follow up appointment with: Physician:   Follow up appointment with: Therapist:    Follow up Aftercare group?  Yes   Patient provided with Crisis Hotline phone number for suicide prevention? Yes     Discharge Scores: Not Completed    Emergency Treatment Plan Completed by patient: Yes__  No__          DASS21:   NATY:   BDI:   Treatment plan electronically signed by Treatment Team:   MARY KAY Spring, LPCC-S, LICROBERT, LEANDRO Abbott, AGUSTIN, MARY KAY Thibodeaux, PsDillon, MARY KAY Mckay, APRN-CNP

## 2023-12-26 ENCOUNTER — SOCIAL WORK (OUTPATIENT)
Dept: BEHAVIORAL HEALTH | Facility: CLINIC | Age: 43
End: 2023-12-26
Payer: COMMERCIAL

## 2023-12-26 ENCOUNTER — CLINICAL SUPPORT (OUTPATIENT)
Dept: BEHAVIORAL HEALTH | Facility: CLINIC | Age: 43
End: 2023-12-26
Payer: COMMERCIAL

## 2023-12-26 DIAGNOSIS — F33.1 MODERATE EPISODE OF RECURRENT MAJOR DEPRESSIVE DISORDER (MULTI): ICD-10-CM

## 2023-12-26 DIAGNOSIS — F41.1 GAD (GENERALIZED ANXIETY DISORDER): ICD-10-CM

## 2023-12-26 PROCEDURE — 90837 PSYTX W PT 60 MINUTES: CPT

## 2023-12-26 PROCEDURE — 90853 GROUP PSYCHOTHERAPY: CPT | Mod: U2,95

## 2023-12-26 NOTE — PROGRESS NOTES
Therapy Session  Progress Note    Type of Interaction: Virtual    Start Time: 11:00 am  End Time: 11:55 am    Appointment: Scheduled    Reason for Visit:  Generalized Anxiety Disorder, Depression    Interval History / Patient Symptoms: The patient said she continues to struggle with anxiety and somatic symptoms tied to her anxiety. She went to a family event for the holidays. She recognized that when she was distracted her anxiety lessened. The patient discussed her anxiety about taking medications, although she knows it would be helpful. She struggles with intrusive thoughts and has difficulty finding anything to distract her from those thoughts. She was able to clarify her values and things that are important to her and she will try to remind herself to focus on those things.     Interventions Provided: Psychoeducation, Values Exploration, and Develop Coping Strategies    Progress Made: Minimum    Response to Intervention: The patient was receptive to the interventions provided.    Plan: The patient will consider ways she can challenge her anxious thoughts and to have a different response to those thoughts.     Follow Up / Next Appointment:  Follow up in 2 weeks.      NICK Orozco, MACO

## 2023-12-27 ENCOUNTER — CLINICAL SUPPORT (OUTPATIENT)
Dept: BEHAVIORAL HEALTH | Facility: CLINIC | Age: 43
End: 2023-12-27
Payer: COMMERCIAL

## 2023-12-27 DIAGNOSIS — F42.4 EXCORIATION (SKIN-PICKING) DISORDER: ICD-10-CM

## 2023-12-27 DIAGNOSIS — F41.1 GAD (GENERALIZED ANXIETY DISORDER): ICD-10-CM

## 2023-12-27 DIAGNOSIS — F33.1 MODERATE EPISODE OF RECURRENT MAJOR DEPRESSIVE DISORDER (MULTI): Primary | ICD-10-CM

## 2023-12-27 PROCEDURE — 90853 GROUP PSYCHOTHERAPY: CPT | Mod: U2,95

## 2023-12-27 NOTE — GROUP NOTE
Group Topic: Coping Skills   Group Date: 12/26/2023  Start Time: 10:00 AM  End Time: 11:00 AM  Facilitators: AGUSTIN Hoover   Department: [facility]    This was a video IOP group on a HIPAA compliant program. All patients were provided with informed consent.     Date: 12/26/23, Time: 10-11a, Number of Patients: 11, Username: [email or web address], Number of Staff: 1  Group Topic(s): cognitive distortions. The group engaged in exploring the definition of cognitive distortion. The group then explored examples of cognitive distortions including magnification, minimization, catastrophizing, and overgeneralizing. Group discussion followed.     Name: Allison Sky YOB: 1980   MR: [id number]      Grisel was on topic and participative. Patient engaged fully and participative. Patient left early at 10:45am for another appointment.   AGUSTIN Ordoñez

## 2023-12-27 NOTE — GROUP NOTE
Group Topic: Goals   Group Date: 12/26/2023  Start Time:  9:00 AM  End Time: 10:00 AM  Facilitators: AGUSTIN Hoover   Department: [facility]    This was a video IOP group on a HIPAA compliant program. All patients were provided with informed consent.     Date: 12/26/2023, Time: 9-10a, Number of Patients: 12, Username: [email or web address], Number of Staff: 1  Group Topic(s): goal check-in. the group engaged in following up on goals set before the weekend. Individually, patients shared the goals they set for themselves and expressed how well they felt they worked towards their goals. Group discussion followed.     Name: Allison Sky YOB: 1980   MR: [id number]      Grisel was on topic and present. Patient engaged fully and appropriately. Patient endorsed she was able to attend [employer] and watched movies. Patient shared that she did not complete her achievement goals yet was able to cook the dinner and go to the grocery store multiple times.   AGUSTIN Ordoñez

## 2023-12-28 ENCOUNTER — OFFICE VISIT (OUTPATIENT)
Dept: ENDOCRINOLOGY | Age: 43
End: 2023-12-28
Payer: COMMERCIAL

## 2023-12-28 ENCOUNTER — CLINICAL SUPPORT (OUTPATIENT)
Dept: BEHAVIORAL HEALTH | Facility: CLINIC | Age: 43
End: 2023-12-28
Payer: COMMERCIAL

## 2023-12-28 VITALS
DIASTOLIC BLOOD PRESSURE: 80 MMHG | BODY MASS INDEX: 35.85 KG/M2 | WEIGHT: 210 LBS | SYSTOLIC BLOOD PRESSURE: 121 MMHG | HEART RATE: 62 BPM | HEIGHT: 64 IN

## 2023-12-28 DIAGNOSIS — F41.1 GAD (GENERALIZED ANXIETY DISORDER): ICD-10-CM

## 2023-12-28 DIAGNOSIS — F33.1 MODERATE EPISODE OF RECURRENT MAJOR DEPRESSIVE DISORDER (MULTI): ICD-10-CM

## 2023-12-28 DIAGNOSIS — E89.0 POSTOPERATIVE HYPOTHYROIDISM: ICD-10-CM

## 2023-12-28 PROCEDURE — 1036F TOBACCO NON-USER: CPT | Performed by: INTERNAL MEDICINE

## 2023-12-28 PROCEDURE — G8427 DOCREV CUR MEDS BY ELIG CLIN: HCPCS | Performed by: INTERNAL MEDICINE

## 2023-12-28 PROCEDURE — G8484 FLU IMMUNIZE NO ADMIN: HCPCS | Performed by: INTERNAL MEDICINE

## 2023-12-28 PROCEDURE — 99213 OFFICE O/P EST LOW 20 MIN: CPT | Performed by: INTERNAL MEDICINE

## 2023-12-28 PROCEDURE — G8417 CALC BMI ABV UP PARAM F/U: HCPCS | Performed by: INTERNAL MEDICINE

## 2023-12-28 PROCEDURE — 90853 GROUP PSYCHOTHERAPY: CPT | Mod: U2,95

## 2023-12-28 RX ORDER — SERTRALINE HYDROCHLORIDE 25 MG/1
25 TABLET, FILM COATED ORAL DAILY
COMMUNITY

## 2023-12-28 RX ORDER — LEVOTHYROXINE SODIUM 0.05 MG/1
TABLET ORAL
Qty: 90 TABLET | Refills: 3 | Status: SHIPPED | OUTPATIENT
Start: 2023-12-28

## 2023-12-28 RX ORDER — LEVOTHYROXINE SODIUM 0.2 MG/1
TABLET ORAL
Qty: 90 TABLET | Refills: 3 | Status: SHIPPED | OUTPATIENT
Start: 2023-12-28

## 2023-12-28 NOTE — GROUP NOTE
Group Topic: Problem Solving   Group Date: 12/27/2023  Start Time: 11:00 AM  End Time: 12:00 PM  Facilitators: AGUSTIN Garza; AGUSTIN Hoover   [facility]    Number of Participants: 13   Group Focus: activities of daily living skills, communication, and coping skills  Treatment Modality: Behavior Modification Therapy, Cognitive Behavioral Therapy, and Patient-Centered Therapy  Interventions utilized were exploration and problem solving  Purpose: coping skills, self-worth, self-care, and relapse prevention strategies    Name: Allison Sky YOB: 1980   MR: [id number]    This was a video IOP group on a HIPAA compliant platform. All patients were provided with informed consent.     Date:12/27/23, Time: 11:00 am-12:00 pm  Number of Patients: 13, User Name:[email or web address],  Number of Staff: 1    Group topic(s): Group members continued the discussion about transitioning out of IOP and back into the workplace successfully. The group was asked to identify and write down things they will get to do and things they are looking forward to doing post IOP.     Patient was present on camera. She was observed writing. She seems to have a better understanding of her rights to privacy and not needing to disclose her personal health information to others at work.     Provider Signature: AGUSTIN Aldridge-S, LICDC

## 2023-12-28 NOTE — GROUP NOTE
Group Topic: Self Esteem   Group Date: 12/27/2023  Start Time:  9:00 AM  End Time: 10:00 AM  Facilitators: AGUSTIN Garza; AUGSTIN Hoover   [facility]    Number of Participants: 13   Group Focus: clarity of thought and other perfectionism  Treatment Modality: Cognitive Behavioral Therapy and Patient-Centered Therapy  Interventions utilized were exploration  Purpose: maladaptive thinking, irrational fears, insight or knowledge, and self-worth    Name: Allison Sky YOB: 1980   MR: [id number]    This session was conducted via HIPAA compliant video. Patient was provided with informed consent.    Date: 12/27/23  Time: 9:00 am to 10:00 am  Group Members: 13  Number of Staff: 1  [email or web address]    Group Topic: Group members began the day with a reading from 'Don't Sweat the Small Stuff' and the topic was 'Repeat after me, Life is not an Emergency'. Group members shared thoughts and takeaways from the reading and then focused on perfectionism.     Patient was present on camera. She appeared attentive and was observed listening. She did not contribute to the discussion.    Primary Facilitator - Kenya Spring, AGUSTIN-S, [facility]

## 2023-12-28 NOTE — GROUP NOTE
Group Topic: Problem Solving   Group Date: 12/27/2023  Start Time: 10:00 AM  End Time: 11:00 AM  Facilitators: AGUSTIN Garza; AGUSTIN Hoover   [facility]    Number of Participants: 13   Group Focus: problem solving and social skills  Treatment Modality: Behavior Modification Therapy, Cognitive Behavioral Therapy, and Patient-Centered Therapy  Interventions utilized were exploration, problem solving, and support  Purpose: communication skills, insight or knowledge, self-care, and relapse prevention strategies    Name: Allison Sky YOB: 1980   MR: [id number]    This was a video IOP group on a HIPAA compliant platform. All patients were provided with informed consent.     Date:12/27/23, Time: 10:00 am-11:00 am  Number of Patients: 13, User Name:[email or web address],  Number of Staff: 1    Group topic(s): Group members identified and wrote down their worries and concerns about competing IOP and returning to the workplace and/or school upon discharge.      Patient was present on camera. She shared wondering what she should or shouldn't shared about why she was away from work. She was informed that she does not need to share anything with anyone she is not comfortable sharing.     Provider Signature: Kenya Spring, AGUSTIN-S, [facility]

## 2023-12-29 ENCOUNTER — DOCUMENTATION (OUTPATIENT)
Dept: BEHAVIORAL HEALTH | Facility: CLINIC | Age: 43
End: 2023-12-29
Payer: COMMERCIAL

## 2023-12-29 NOTE — GROUP NOTE
Group Topic: Goals   Group Date: 12/28/2023  Start Time:  9:00 AM  End Time: 10:00 AM  Facilitators: AGUSTIN Hoover   Department: [facility]    This was a video IOP group on a HIPAA compliant program. All patients were provided with informed consent.     Date: 12/28/23, Time: 9-10am, Number of Patients: 13, Username: [email or web address], Number of Staff: 1  Group Topic(s): goal setting. The group engaged in a review of group topics covered throughout the week. Individually, patients then set goals for the weekend including achievement, enjoyment, and closeness to others. Patients identified anticipated obstacles over the weekend and coping skills to work through these challenges. The group engaged in a discussion of sharing their set goals.      Name: Allison Sky YOB: 1980   MR: [id number]      Grisel was present for a portion of group before leaving early at 9:40. Patient completed goals and shared them via the chat. Patient identified goals for the weekend including washing hair, donating kids old clothes, playing with the kids outside, spending time with the family on [place] and relaxing at least one time. Patient identified barriers to completing goals as rumination, catastrophizing, procrastination, and anxiety. Patient plans to use opposite action, grounding and square breathing as coping to manage barriers.   AGUSTIN Ordoñez

## 2023-12-29 NOTE — PROGRESS NOTES
INTENSIVE OUTPATIENT PROGRAM MULTIDISCIPLINARY  TREATMENT PLAN & PROGRESS NOTE     Patient: Cassia Sky  : 1980  Age: 43     Student: No  Treatment Team Date: 23 MRN#: [id number]    Attending Physician: Dr. MARY Pittman MD  Date of IOP Admission: 10/30/23   Ref by: Self   Week: 9                     Admission Scores:   DASS21: 43   NATY: 39 BDI: 42 PHQ-9: 17  MAST: 0 DAST: 0 MDQ: Positive    Current Risk Assessment:  Suicidal Risk:  None: X     Ideation:       Plan:      Intent:      SI Plan with plan contracts for safety:  Hx of harming self:        Homicidal Risk:  None:   X     Ideation:       Plan:      Intent:      HI Plan with means:  Hx of harming others:          Global Improvement  Clinical Global Impressions Rating Scale Of Illness:  5  1-No Illness Present   2-Minimal   3-Mild   4-Moderate   5-Marked X   6-Severe     7-Very Severe    Diagnoses & ICD-10 Codes  Primary Diagnosis & Code: Major Depressive Disorder, recurrent, moderate; F33.1  Secondary Diagnosis & Code: Generalized Anxiety Disorder; F41.1     Psychosocial & Environmental Stressors:   1. Work life   2. Family/home life   3. Ongoing mental health concerns     Patient's Treatment Goals:  Date Initiated:           Progress Update:               1.  Attend and actively participate in IOP _4_ days/week for 3 hours per day  10/30/2023   Good X      Fair      Poor  Unclear    2.  Attend weekly medication management sessions and maintain compliance of medications  10/30/2023     Good X      Fair      Poor  Unclear                                                    3.  Identify symptoms of depression and anxiety while developing coping plans  10/30/2023   Good X      Fair      Poor  Unclear                                4.  Increase self-esteem and gaining greater social support   10/30/2023  Good         Fair X   Poor  Unclear       Current medications:  See EMR chart       Progress note  __New to the IOP program, attending as  planned  __Compliant, Progressing & Improving - Needs more time in IOP therapy program   _X_Compliant, Progressing & Improving Planning Discharge   __Compliant, Not Progressing or Improving: Reason  __Non-Compliant, not progressing or improving: Plan  __Other:    Insurance & Benefits:  [employer], IN NETWORK, BENEFITS ARE BASED ON MEDICAL NECESSITY ONLY: Unlimited visits, covers 100% of the contracted rate after deductible has been met. 1 in 1 day.     Co-Pay per day: $0    DEDUCTIBLE        Single: / Family: /  Accumulation:  Single: /     Family:  /  CO- INSURANCE  Single:  Family:   Accumulation:  Single:      Family:   OUT OF POCKET  Single: 6600 Family: [id number]  Accumulation:  Single: 602.49 Family: [id number]     Total IOP Sessions completed & authorized to date:  30    Discharge plans have been discussed with patient & medication management provider on:   Reason for discharge:    ___Successfully completed IOP treatment:   ___Pt. decided to seek treatment elsewhere:   ___Dropped out or no show more than three times:  ___Benefits exhausted:   ___Other:    Discharge date:                     Discharge Prognosis: Excellent      Good     Fair     Poor    Follow up appointment with: Physician:   Follow up appointment with: Therapist:    Follow up Aftercare group?  Yes   Patient provided with Crisis Hotline phone number for suicide prevention? Yes     Discharge Scores: Not Completed    Emergency Treatment Plan Completed by patient: Yes__  No__          DASS21:   NATY:   BDI:   Treatment plan electronically signed by Treatment Team:   MARY KAY Spring, LPCC-S, LICROBERT, LEANDRO Abbott, AGUSTIN, MARY KAY Thibodeaux, PsDillon, MARY KAY Mckay, APRN-CNP

## 2024-01-01 PROBLEM — R11.0 NAUSEA: Status: ACTIVE | Noted: 2024-01-01

## 2024-01-01 PROBLEM — R53.83 FATIGUE: Status: ACTIVE | Noted: 2024-01-01

## 2024-01-01 PROBLEM — K29.70 GASTRITIS: Status: ACTIVE | Noted: 2022-12-30

## 2024-01-01 PROBLEM — R07.9 CHEST PAIN: Status: ACTIVE | Noted: 2024-01-01

## 2024-01-01 PROBLEM — Z79.899 OTHER LONG TERM (CURRENT) DRUG THERAPY: Status: ACTIVE | Noted: 2023-11-16

## 2024-01-01 PROBLEM — E28.2 POLYCYSTIC OVARY SYNDROME: Status: ACTIVE | Noted: 2020-03-07

## 2024-01-01 PROBLEM — K22.9 ESOPHAGEAL DISORDER: Status: ACTIVE | Noted: 2018-09-05

## 2024-01-01 PROBLEM — K62.5 RECTAL HEMORRHAGE: Status: ACTIVE | Noted: 2022-12-30

## 2024-01-01 PROBLEM — D25.1 INTRAMURAL LEIOMYOMA OF UTERUS: Status: ACTIVE | Noted: 2023-03-09

## 2024-01-01 PROBLEM — R93.89 ABNORMAL MAGNETIC RESONANCE IMAGING STUDY: Status: ACTIVE | Noted: 2022-06-14

## 2024-01-01 PROBLEM — Q76.2 CONGENITAL SPONDYLOLYSIS OF LUMBOSACRAL REGION: Status: ACTIVE | Noted: 2017-09-14

## 2024-01-01 PROBLEM — E66.01 MORBID OBESITY (MULTI): Status: ACTIVE | Noted: 2017-10-10

## 2024-01-01 PROBLEM — L02.01 ABSCESS OF FACE: Status: ACTIVE | Noted: 2018-09-05

## 2024-01-01 PROBLEM — G43.109 MIGRAINE WITH AURA: Status: ACTIVE | Noted: 2021-11-01

## 2024-01-01 PROBLEM — R42 DIZZINESS: Status: ACTIVE | Noted: 2021-11-01

## 2024-01-01 PROBLEM — M79.603 PAIN OF UPPER EXTREMITY: Status: ACTIVE | Noted: 2023-01-26

## 2024-01-01 PROBLEM — M54.10 RADICULOPATHY WITH LOWER EXTREMITY SYMPTOMS: Status: ACTIVE | Noted: 2017-09-14

## 2024-01-02 ENCOUNTER — CLINICAL SUPPORT (OUTPATIENT)
Dept: BEHAVIORAL HEALTH | Facility: CLINIC | Age: 44
End: 2024-01-02
Payer: COMMERCIAL

## 2024-01-02 ENCOUNTER — TELEMEDICINE (OUTPATIENT)
Dept: BEHAVIORAL HEALTH | Facility: CLINIC | Age: 44
End: 2024-01-02
Payer: COMMERCIAL

## 2024-01-02 DIAGNOSIS — F33.1 MODERATE EPISODE OF RECURRENT MAJOR DEPRESSIVE DISORDER (MULTI): ICD-10-CM

## 2024-01-02 DIAGNOSIS — F42.4 EXCORIATION (SKIN-PICKING) DISORDER: ICD-10-CM

## 2024-01-02 DIAGNOSIS — Z79.899 MEDICATION MANAGEMENT: ICD-10-CM

## 2024-01-02 DIAGNOSIS — F33.1 MODERATE EPISODE OF RECURRENT MAJOR DEPRESSIVE DISORDER (MULTI): Primary | ICD-10-CM

## 2024-01-02 DIAGNOSIS — F41.1 GAD (GENERALIZED ANXIETY DISORDER): Primary | ICD-10-CM

## 2024-01-02 DIAGNOSIS — G37.3 TRANSVERSE MYELITIS (MULTI): ICD-10-CM

## 2024-01-02 DIAGNOSIS — E55.9 VITAMIN D DEFICIENCY: ICD-10-CM

## 2024-01-02 DIAGNOSIS — F41.1 GAD (GENERALIZED ANXIETY DISORDER): ICD-10-CM

## 2024-01-02 PROCEDURE — 90853 GROUP PSYCHOTHERAPY: CPT | Mod: U2,95

## 2024-01-02 PROCEDURE — 99214 OFFICE O/P EST MOD 30 MIN: CPT | Performed by: NURSE PRACTITIONER

## 2024-01-02 RX ORDER — SERTRALINE HYDROCHLORIDE 25 MG/1
25 TABLET, FILM COATED ORAL DAILY
Qty: 90 TABLET | Refills: 3 | Status: SHIPPED | OUTPATIENT
Start: 2024-01-02 | End: 2025-01-01

## 2024-01-02 RX ORDER — BUSPIRONE HYDROCHLORIDE 15 MG/1
15 TABLET ORAL 2 TIMES DAILY
Qty: 180 TABLET | Refills: 3 | Status: SHIPPED | OUTPATIENT
Start: 2024-01-02 | End: 2025-01-01

## 2024-01-02 ASSESSMENT — ENCOUNTER SYMPTOMS
CONSTITUTIONAL NEGATIVE: 1
NAUSEA: 1

## 2024-01-02 NOTE — GROUP NOTE
Group Topic: Coping Skills   Group Date: 1/2/2024  Start Time: 10:00 AM  End Time: 11:00 AM  Facilitators: Betsy Thibodeaux PsyD   Department:  [facility]    Number of Participants: 11   Group Focus: coping skills  Treatment Modality: Cognitive Behavioral Therapy  Interventions utilized were patient education  Purpose: coping skills    This was a video IOP group on a HIPAA compliant platform. All patients were provided with informed consent.    Group topic: Check-in and Behavioral Activation  Group members finished checking in with their weekend goals.    Participants received education on Behavioral Activation, motivation and vicious cycles of depression. They identified their own vicious cycles that keep them stuck in low mood.   Betsy Thibodeaux Psy.D.        Name: Allison Sky YOB: 1980   MR: [id number]      Allison was present and actively engaged in discussion. She stated that she had a difficult weekend related to multiple incidences. She stated that she stayed in bed after experiencing very high anxiety and some embarrassment. We explored new ways of coping.

## 2024-01-02 NOTE — GROUP NOTE
Group Topic: Coping Skills   Group Date: 1/2/2024  Start Time: 11:00 AM  End Time: 12:00 PM  Facilitators: Betsy Thibodeaux PsyD   Department:  [facility]    Number of Participants: 11   Group Focus: coping skills  Treatment Modality: Cognitive Behavioral Therapy  Interventions utilized were patient education  Purpose: coping skills    This was a video IOP group on a HIPAA compliant platform. All patients were provided with informed consent.    Group topic: Behavioral Activation  Participants shared experiences related to their vicious cycles. They received psychoeducation on the various activities in behavioral activation (value, pleasure and mastery). They identified activities they would be willing to engage in for each category.   Betsy Thibodeaux Psy.D.        Name: Allison Sky YOB: 1980   MR: [id number]      Grisel was present and actively engaged in discussion. She stated that she values spirituality and would like to align with this value by reading the bible and attending more services.
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
## 2024-01-02 NOTE — GROUP NOTE
Group Topic: Goals   Group Date: 1/2/2024  Start Time:  9:00 AM  End Time: 10:00 AM  Facilitators: AGUSTIN Garza; Betsy Thibodeaux PsyD   [facility]    Number of Participants: 11   Group Focus: activities of daily living skills, check in, coping skills, and goals  Treatment Modality: Behavior Modification Therapy, Cognitive Behavioral Therapy, and Solution-Focused Therapy  Interventions utilized were group exercise, problem solving, and support  Purpose: coping skills, insight or knowledge, self-care, and relapse prevention strategies    Name: Allison Sky YOB: 1980   MR: [id number]    This was a video IOP group on a HIPAA compliant platform. All patients were provided with informed consent.     Date: 1/2/2024  Time: 9-10a, Number of Patients: 11, User Name: Edie, Number of Staff: 1    Group topic(s): Weekend SMART goal review    Group members took turns sharing progress and challenges they faced this weekend related to their identified SMART goals.     Allison arrived about 15 minutes late but was present on camera and appeared attentive. She did not have an opportunity to share her weekend SMART goals during this hour.     Primary Facilitator Kenya Spring, AGUSTIN-S, [facility]

## 2024-01-03 ENCOUNTER — CLINICAL SUPPORT (OUTPATIENT)
Dept: BEHAVIORAL HEALTH | Facility: CLINIC | Age: 44
End: 2024-01-03
Payer: COMMERCIAL

## 2024-01-03 DIAGNOSIS — F41.1 GAD (GENERALIZED ANXIETY DISORDER): ICD-10-CM

## 2024-01-03 DIAGNOSIS — F33.1 MODERATE EPISODE OF RECURRENT MAJOR DEPRESSIVE DISORDER (MULTI): Primary | ICD-10-CM

## 2024-01-03 DIAGNOSIS — F42.4 EXCORIATION (SKIN-PICKING) DISORDER: ICD-10-CM

## 2024-01-03 PROCEDURE — 90853 GROUP PSYCHOTHERAPY: CPT | Mod: U2,95

## 2024-01-03 NOTE — GROUP NOTE
Group Topic: Coping Skills   Group Date: 1/3/2024  Start Time: 10:00 AM  End Time: 11:00 AM  Facilitators: AGUSTIN Garza; Betsy Thibodeaux PsyD   [facility]    Number of Participants: 13   Group Focus: coping skills, psychiatric education, and self-awareness  Treatment Modality: Behavior Modification Therapy, Cognitive Behavioral Therapy, and Patient-Centered Therapy  Interventions utilized were exploration and patient education  Purpose: coping skills, maladaptive thinking, and insight or knowledge    Name: Allison Sky YOB: 1980   MR: [id number]    This session was conducted via HIPAA compliant video. Patient was provided with informed consent.    Date: 1/3/24  Time: 10:00 am to 11:00 am  Group Members: 13  Number of Staff: 1  [email or web address]    Group Topic: Psychoeducational video introducing CBT. CBT concepts of situations, thoughts, feelings and reactions were reviewed. Group members then spent time discussing how negative automatic thoughts impact their feelings and behaviors and reviewing various thought stopping techniques.    Patient was present and visible on camera. She was attentive as the information was reviewed. She did not participate in the group discussion about CBT concepts. Today is her last day in [facility].    Primary Facilitator AGUSTIN Aldridge-S, [facility]

## 2024-01-04 ENCOUNTER — APPOINTMENT (OUTPATIENT)
Dept: BEHAVIORAL HEALTH | Facility: CLINIC | Age: 44
End: 2024-01-04
Payer: COMMERCIAL

## 2024-01-05 ENCOUNTER — DOCUMENTATION (OUTPATIENT)
Dept: BEHAVIORAL HEALTH | Facility: CLINIC | Age: 44
End: 2024-01-05
Payer: COMMERCIAL

## 2024-01-08 NOTE — GROUP NOTE
Group Topic: Coping Skills   Group Date: 1/3/2024  Start Time:  9:00 AM  End Time: 10:00 AM  Facilitators: Betsy Thibodeaux PsyD   Department:  [facility]    Number of Participants: 13   Group Focus: mindfulness  Treatment Modality: Skills Training  Interventions utilized were patient education  Purpose: other: Mindfulness    This was a video IOP group on a HIPAA compliant platform. All patients were provided with informed consent.  Group topic: Mindfulness  Group members received psychoeducation on the foundations of mindfulness, wise mind and why mindfulness is useful. They explored ways to practice mindfulness (e.g., meditation, body scan, mindful eating) and identified how they may practice this in their day to day lives.   Betsy Thibodeaux Psy.D.      Name: Allison Sky YOB: 1980   MR: [id number]      Grisel was present and actively engaged in discussion. She discussed using mindfulness with anxiety.

## 2024-01-09 NOTE — PROGRESS NOTES
INTENSIVE OUTPATIENT PROGRAM MULTIDISCIPLINARY  TREATMENT PLAN & PROGRESS NOTE     Patient: Cassia Sky  : 1980  Age: 43     Student: No  Treatment Team Date: 23 MRN#: [id number]    Attending Physician: Dr. MARY Pittman MD  Date of IOP Admission: 10/30/23   Ref by: Self   Week: 10                     Admission Scores:   DASS21: 43   NATY: 39 BDI: 42 PHQ-9: 17  MAST: 0 DAST: 0 MDQ: Positive    Current Risk Assessment:  Suicidal Risk:  None: X     Ideation:       Plan:      Intent:      SI Plan with plan contracts for safety:  Hx of harming self:        Homicidal Risk:  None:   X     Ideation:       Plan:      Intent:      HI Plan with means:  Hx of harming others:          Global Improvement  Clinical Global Impressions Rating Scale Of Illness:  5  1-No Illness Present   2-Minimal   3-Mild   4-Moderate   5-Marked X   6-Severe     7-Very Severe    Diagnoses & ICD-10 Codes  Primary Diagnosis & Code: Major Depressive Disorder, recurrent, moderate; F33.1  Secondary Diagnosis & Code: Generalized Anxiety Disorder; F41.1     Psychosocial & Environmental Stressors:   1. Work life   2. Family/home life   3. Ongoing mental health concerns     Patient's Treatment Goals:  Date Initiated:           Progress Update:               1.  Attend and actively participate in IOP _4_ days/week for 3 hours per day  10/30/2023   Good X      Fair      Poor  Unclear    2.  Attend weekly medication management sessions and maintain compliance of medications  10/30/2023     Good X      Fair      Poor  Unclear                                                    3.  Identify symptoms of depression and anxiety while developing coping plans  10/30/2023   Good X      Fair      Poor  Unclear                                4.  Increase self-esteem and gaining greater social support   10/30/2023  Good X      Fair      Poor  Unclear       Current medications:  See EMR chart       Progress note  __New to the IOP program, attending as  planned  __Compliant, Progressing & Improving - Needs more time in IOP therapy program   _X_Compliant, Progressing & Improving Planning Discharge - DISCHARGED   __Compliant, Not Progressing or Improving: Reason  __Non-Compliant, not progressing or improving: Plan  __Other:    Insurance & Benefits:  [facility] [place], IN NETWORK, BENEFITS ARE BASED ON MEDICAL NECESSITY ONLY: Unlimited visits, covers 100% of the contracted rate after deductible has been met. 1 in 1 day.     Co-Pay per day: $0    DEDUCTIBLE        Single: / Family: /  Accumulation:  Single: /     Family:  /  CO- INSURANCE  Single:  Family:   Accumulation:  Single:      Family:   OUT OF POCKET  Single: 6600 Family: [id number]  Accumulation:  Single: 602.49 Family: [id number]     Total IOP Sessions completed & authorized to date:  32    Discharge plans have been discussed with patient & medication management provider on:  1/2/2024  Reason for discharge:    __X_Successfully completed IOP treatment:   ___Pt. decided to seek treatment elsewhere:   ___Dropped out or no show more than three times:  ___Benefits exhausted:   ___Other:    Discharge date: 1/3/2024                     Discharge Prognosis: Good   Follow up appointment with: Physician: PCP   Follow up appointment with: Therapist: Mackenzie Calix    Follow up Aftercare group?  Yes   Patient provided with Crisis Hotline phone number for suicide prevention? Yes     Discharge Scores: Not Completed    Emergency Treatment Plan Completed by patient: Provided to pt., not yet completed.   DASS21:   NATY:   BDI:   Treatment plan electronically signed by Treatment Team:   MARY KAY Spring, WILMARC-S, [facility] Hospital[facility]OB[facility], LEANDRO Abbott, AGUSTIN, MARY KAY Thibodeaux, PsDillno, MARY KAY Mckay, APRN-CNP

## 2024-01-12 ENCOUNTER — CLINICAL SUPPORT (OUTPATIENT)
Dept: BEHAVIORAL HEALTH | Facility: CLINIC | Age: 44
End: 2024-01-12
Payer: COMMERCIAL

## 2024-01-12 DIAGNOSIS — F41.1 GAD (GENERALIZED ANXIETY DISORDER): ICD-10-CM

## 2024-01-12 DIAGNOSIS — F33.1 MODERATE EPISODE OF RECURRENT MAJOR DEPRESSIVE DISORDER (MULTI): ICD-10-CM

## 2024-01-12 PROCEDURE — 90853 GROUP PSYCHOTHERAPY: CPT | Mod: U2,95

## 2024-01-12 NOTE — GROUP NOTE
Group Topic: Feeling Awareness/Expression   Group Date: 1/12/2024  Start Time: 10:00 AM  End Time: 11:30 AM  Facilitators: AGUSTIN Hoover   Department: [facility]    This was a video IOP group on a HIPAA compliant program. All patients were provided with informed consent.     Date: 1/12/24, Time: 10-11:30am, Number of Patients: 7, Username: [email or web address], Number of Staff: 1  Group Topic(s): three good things; gratitude. The group began by checking in and sharing a high and low point from their week. Individually patients engaged in choosing three prompts related to gratitude and reflected. Group discussion followed regarding what each patient was grateful for and their experience with the exercise.     Name: Allison Sky YOB: 1980   MR: [id number]      Grisel was on topic and present. Patient engaged fully and appropriately. Patient shared a high of the week as getting to work most of this week, getting to [employer] and spending time with her kids. Patient endorsed the low point of her week was going to work today but feeling unable to go in. Patient shared she ended up calling off and was feeling frustrated by this. Patient followed along and nonverbally engaged appropriately in exercise.   AGUSTIN Ordoñez

## 2024-01-16 ENCOUNTER — CLINICAL SUPPORT (OUTPATIENT)
Dept: BEHAVIORAL HEALTH | Facility: CLINIC | Age: 44
End: 2024-01-16
Payer: COMMERCIAL

## 2024-01-16 DIAGNOSIS — F41.1 GAD (GENERALIZED ANXIETY DISORDER): ICD-10-CM

## 2024-01-16 DIAGNOSIS — F42.4 SKIN PICKING HABIT: ICD-10-CM

## 2024-01-16 DIAGNOSIS — F33.1 MAJOR DEPRESSIVE DISORDER, RECURRENT EPISODE, MODERATE (MULTI): ICD-10-CM

## 2024-01-16 PROCEDURE — 90853 GROUP PSYCHOTHERAPY: CPT | Mod: U2,95

## 2024-01-17 NOTE — GROUP NOTE
Group Topic: Feeling Awareness/Expression   Group Date: 1/16/2024  Start Time:  5:30 PM  End Time:  7:00 PM  Facilitators: AGUSTIN Porter   Department: [facility]    Number of Participants: 8   Group Focus: Check-ins/  Treatment Modality: Cognitive Behavioral Therapy and Dialectical Behavioral Therapy  Interventions utilized were exploration, group exercise, patient education, and support  Purpose: coping skills, feelings, insight or knowledge, and self-care    This was a video IOP aftercare group on a HIPAA compliant platform. All patients were provided with informed consent.     Date: 1/16/2024, Time: 5:30-7:00 pm group, Number of Patients Present: 8, User Name: AGUSTIN Verde, ATR, Number of Staff: 2    Group Topic(s): James Youssef Bud Mindfulness Exercise      Pt. first introduced themselves to the group and shared one interest/hobby. Next, pt. completed the юлия, thorn, bud mindfulness exercise. Pt. provided examples of something positive that happened over the weekend (юлия), a challenge (james), and something to look forward to and/or want work on (bud). Pt. was present, attentive, and fully engaged in group discussion.     AGUSTIN Hogan, ATR    Name: Allison Sky YOB: 1980   MR: [id number]

## 2024-01-23 ENCOUNTER — CLINICAL SUPPORT (OUTPATIENT)
Dept: BEHAVIORAL HEALTH | Facility: CLINIC | Age: 44
End: 2024-01-23
Payer: COMMERCIAL

## 2024-01-23 DIAGNOSIS — F41.1 GAD (GENERALIZED ANXIETY DISORDER): ICD-10-CM

## 2024-01-23 DIAGNOSIS — F33.1 MAJOR DEPRESSIVE DISORDER, RECURRENT EPISODE, MODERATE (MULTI): ICD-10-CM

## 2024-01-23 PROCEDURE — 90853 GROUP PSYCHOTHERAPY: CPT | Mod: U2,95

## 2024-01-24 ENCOUNTER — SOCIAL WORK (OUTPATIENT)
Dept: BEHAVIORAL HEALTH | Facility: CLINIC | Age: 44
End: 2024-01-24
Payer: COMMERCIAL

## 2024-01-24 DIAGNOSIS — F33.1 MAJOR DEPRESSIVE DISORDER, RECURRENT EPISODE, MODERATE (MULTI): ICD-10-CM

## 2024-01-24 DIAGNOSIS — F41.1 GAD (GENERALIZED ANXIETY DISORDER): ICD-10-CM

## 2024-01-24 PROCEDURE — 90837 PSYTX W PT 60 MINUTES: CPT

## 2024-01-24 NOTE — PROGRESS NOTES
Therapy Session  Progress Note    Session Type: Virtual    Start Time: 9:00 am  End Time: 9:55 am    Appointment: Scheduled    Reason for Visit: Depression, Anxiety    Patient Symptoms/Interval History: High level of anxiety, has missed work the past week and a half. Drives to work, unable to get out of her car. Has physical sensations, nausea. 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 Had a good experience the days she did work. Afraid of losing her job.    Mental Status: Alert & Oriented x3    Functional Status: Severe impairment    Interventions Provided: CBT, Develop Coping Skills    Progress Made: Minimum    Response to Interventions: The patient was receptive to the interventions provided.    Action Plan/Homework: The patient will go back to her work later in the day if she is unable to get out of her car in the morning. She will go into the building and visit a friend, even if she is unable to work.    Treatment Plan: Use CBT and Exposure Therapy to help the patient manage her anxiety    Follow Up/Next Appointment: Follow up in 1 weeks.      NICK Orozco, MACO      **This visit was performed using real-time telehealth tools, including an audio/video connection between the patient and this provider.

## 2024-01-24 NOTE — PSYCHOTHERAPY
Went back to work for 5 days. It went well, wonderful. A little anxiety, talking w/ friends  Online group, reading the Bible  Went to [facility] aftercare yesterday    Not at work last week and a half. Put in a call-off. She drives to work - sits in the car, cries.  Anxious before she gets there, anxious when goes to bed, gets up, feels good  Takes Zofran, anxiety meds, lays down, sits on the couch until last minute  On the phone the whole time, not wanting to go in.  Tries to distract herself to help w/ anxiety    Anxiety about being stuck at work. Don't want to have to wait on them.   If doesn't have something to do, anxiety gets high  Crocheting doesn't help.     Buspar, hydroxyzine for anxiety, somewhat helpful  Poor sleep    Opposite Action - used to be able to use.   Tried to go into work this morning -   AT- Why can't I be like that?       -     To go in and see a friend    Went everyday - at time went into the building.   Not feeling ok, not able to sit down    2 breaks, one lunch    Trucks keep going, pull a cord,   False alarm    Was back to normal - took steps to get back - exposure.   Physical sensation - rumbling in stomach, hot and nauseous, lump in throat  Belief - if I don't feel well I have to lay down  Transverse myelitis -   Nauseous -   If she can relax, she would feel better    Zofran, lemon juice  Bosses are aware of her issues w/ anxiety    HW: To go to her job each day, even if just to visit a friend

## 2024-01-25 NOTE — GROUP NOTE
Group Topic: Feeling Awareness/Expression   Group Date: 1/23/2024  Start Time:  5:30 PM  End Time:  7:00 PM  Facilitators: AGUSTIN Hoover   Department: [facility]    This was a video IOP group on a HIPAA compliant program. All patients were provided with informed consent.     Date: 1/23/2024, Time: 5:30-7:00pm, Number of Patients: 7, Username: [email or web address], Number of Staff: 1  Group Topic(s): check in. Patient engaged in checking in and sharing a high and a low from the week as well as something they are looking forward to. Group discussion followed.    Name: Allison Sky YOB: 1980   MR: [id number]      Grisel arrived 30 minutes late yet engaged fully once in group. Patient maintained attentiveness and nonverbal engagement throughout.Patient shared struggling this week and a low point as not attending work much of this week. Patient shared being able to drive to work but is unable to go into work. Patient expressed feeling frustrated by this as she is unsure why she is unable to get into work. Patient was offered support and advice from her peers.   AGUSTIN Ordoñez

## 2024-01-29 ENCOUNTER — SOCIAL WORK (OUTPATIENT)
Dept: BEHAVIORAL HEALTH | Facility: CLINIC | Age: 44
End: 2024-01-29
Payer: COMMERCIAL

## 2024-01-29 DIAGNOSIS — F33.1 MAJOR DEPRESSIVE DISORDER, RECURRENT EPISODE, MODERATE (MULTI): ICD-10-CM

## 2024-01-29 DIAGNOSIS — F41.1 GAD (GENERALIZED ANXIETY DISORDER): ICD-10-CM

## 2024-01-29 PROCEDURE — 90837 PSYTX W PT 60 MINUTES: CPT

## 2024-01-29 NOTE — PSYCHOTHERAPY
"Went to job but didn't work, high anxiety, went to the locker room  Sitting in locker room, anxious all weekend  Couldn't sleep, prayed    Anxiety - feel like I'm not safe  Safe place - home inside her house.    Got locked out of her house, \"losing it\"  Safe places - not at grandparents' -     Grew up at grandparents' house  Fact vs feeling.  Went to locker room - having anxiety, sat in parking lot, got worse. Friend came to get her.  Friend sat w/ her - heart racing, thought she was going to pass out  Something hits her.  Can't calm it down    AT - I'm going to pass out  I'm going to throw up  If they look at me it will be worse    Mom asked her to borrow money.   Mom didn't respond when she was struggling today  Sucks she feels this way  Mom texted her that she loves her. Trying to get up courage to ask her for money    Family, sisters think she is over-exaggerating  She tried to explain it to them. They act like they  care    9-year-old - brave. Times they were hungry. Stomach rumble, would go to sleep.  Not enough for them all to eat    Went to ER - for stomach issues - couldn't tell people what was going on  Protectie of her sister, called grandparents    Very angry about that time. Alex pushed his sister, mom didn't care.    Taking Buspar, not taking sertraline,  - to take at night  1/2 tablet                    "

## 2024-01-29 NOTE — PROGRESS NOTES
Therapy Session  Progress Note    Session Type: Virtual    Start Time: 8:00 am  End Time: 8:55 am    Appointment: Scheduled    Reason for Visit: Anxiety, Panic Attack    Patient Symptoms/Interval History: High level of anxiety, had to leave work today. Had a friend help walk her in, fears she will pass out or get sick. Afraid of losing her job due to missing work. Stress and anger because her mother asks for money, doesn't care that she is not doing well. History of trauma as a child, being left alone for days at a time at age 9 with no food.    Mental Status: Alert & Oriented x3    Functional Status: Severe impairment    Interventions Provided: CBT, Develop Coping Skills    Progress Made: Minimum. The patient has been taking her Buspar    Response to Interventions: The patient was receptive to the interventions provided.    Action Plan/Homework: The patient will try to take 1/2 a tablet of the Sertraline to manage her anxiety    Treatment Plan: Use CBT to help the patient gain awareness of unhelpful thought patterns and core beliefs. Help the patient learn healthy coping skills.    Follow Up/Next Appointment: Follow up in 1 week      NICK Orozco, CARLYN-S    **This visit was performed using real-time telehealth tools, including an audio/video connection between the patient and this provider. The patient provided consent for the individual telemedicine service and understands the limitations of the visit.

## 2024-02-02 ENCOUNTER — CLINICAL SUPPORT (OUTPATIENT)
Dept: BEHAVIORAL HEALTH | Facility: CLINIC | Age: 44
End: 2024-02-02
Payer: COMMERCIAL

## 2024-02-02 DIAGNOSIS — F33.1 MAJOR DEPRESSIVE DISORDER, RECURRENT EPISODE, MODERATE (MULTI): ICD-10-CM

## 2024-02-02 DIAGNOSIS — F42.4 SKIN PICKING HABIT: ICD-10-CM

## 2024-02-02 DIAGNOSIS — F41.1 GENERALIZED ANXIETY DISORDER: ICD-10-CM

## 2024-02-02 PROCEDURE — 90853 GROUP PSYCHOTHERAPY: CPT | Mod: U2,95

## 2024-02-05 ENCOUNTER — PATIENT MESSAGE (OUTPATIENT)
Dept: BEHAVIORAL/MENTAL HEALTH CLINIC | Age: 44
End: 2024-02-05

## 2024-02-05 ENCOUNTER — TELEMEDICINE (OUTPATIENT)
Dept: BEHAVIORAL/MENTAL HEALTH CLINIC | Age: 44
End: 2024-02-05
Payer: COMMERCIAL

## 2024-02-05 DIAGNOSIS — F41.1 GENERALIZED ANXIETY DISORDER: ICD-10-CM

## 2024-02-05 DIAGNOSIS — F33.1 MAJOR DEPRESSIVE DISORDER, RECURRENT, MODERATE (HCC): Primary | ICD-10-CM

## 2024-02-05 DIAGNOSIS — F40.01 AGORAPHOBIA WITH PANIC DISORDER: ICD-10-CM

## 2024-02-05 PROCEDURE — 99204 OFFICE O/P NEW MOD 45 MIN: CPT | Performed by: REGISTERED NURSE

## 2024-02-05 PROCEDURE — 90833 PSYTX W PT W E/M 30 MIN: CPT | Performed by: REGISTERED NURSE

## 2024-02-05 PROCEDURE — G8417 CALC BMI ABV UP PARAM F/U: HCPCS | Performed by: REGISTERED NURSE

## 2024-02-05 PROCEDURE — G8484 FLU IMMUNIZE NO ADMIN: HCPCS | Performed by: REGISTERED NURSE

## 2024-02-05 PROCEDURE — G8427 DOCREV CUR MEDS BY ELIG CLIN: HCPCS | Performed by: REGISTERED NURSE

## 2024-02-05 PROCEDURE — 1036F TOBACCO NON-USER: CPT | Performed by: REGISTERED NURSE

## 2024-02-05 RX ORDER — TRAZODONE HYDROCHLORIDE 50 MG/1
50 TABLET ORAL NIGHTLY
Qty: 30 TABLET | Refills: 1 | Status: SHIPPED | OUTPATIENT
Start: 2024-02-05

## 2024-02-05 NOTE — GROUP NOTE
Group Topic: Goals   Group Date: 2/2/2024  Start Time: 10:00 AM  End Time: 11:30 AM  Facilitators: AGUSTIN Porter   Department: [facility]    This was a video aftercare group on a HIPAA compliant platform. All patients were provided with informed consent.      Date: 4/2/2024  Time: 10:00-11:30 am group  User Name: AGUSTIN Verde, ATR  Number of Staff: 1  Number of Patients: 7      Group Topic(s): Introductions/Weekend intention setting/Meditation         Pt. first introduced themselves to the group and engaged in a feelings check-in. Next, pt. set and shared intentions for the weekend, including: two goals, one self-care activity, and one person they can reach out to for support and social closeness. Lastly, pt. engaged in a 5-minute meditation. Pt. was present, attentive, and fully engaged in group discussion.     AGUSTIN Hogan, ATR    Name: Allison Sky YOB: 1980   MR: [id number]      Grisel spoke about her work and her son recently undergoing surgery. Pt. Shared how her mental health has negatively impacted her ability to perform at work. The group offered pt support and feedback.

## 2024-02-05 NOTE — PROGRESS NOTES
to them.      JONAH:    Excessive anxiety and worry, occurring most days of the month  Find it difficult to control the worry  Associated symptoms: Restless, tiredness, poor concentration, irritability, muscle tension, sleep disturbances, feel nauseous, ruminated    Panic attacks: severe  Duration:   Pt describe recurrent unexpected panic attacks, with abrupt surge of intense fear, discomfort which reached peak in minutes.  Associated symptoms:  Palpitation, sweating, trembling, SOB, choking sensation, chest discomfort, dizzy, nausea, tingling, fear of dying and loosing control      Panic attack followed   - Anticipatory anxiety   - Avoidance behavior: going to work; shows up to the parking lot then goes home      Endorses racing thoughts, states anxiety increases when feeling like she does not have anyone to talk to.  Driving increases anxiety, reports feeling like she will be embarrassed if people see her with increased panic.  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     Reports hiding from children and family when they think she should be at work.    Poor sleep, 3-4 hours broken; utilizes atarax and melatonin but not effective    Reports closing people out because she does not want them to know what she is going through    Denies SI HI AVH; no delusions; denies trauma history       Stressors: a lot of deaths in the family, unsure of additional triggers     States a few night ago, felt burst of energy in the middle of the nights, decreased need for sleep; denies impulsivity     Greatest source of support is boyfriend and friend       Social History:  Childhood:\"terrible\"  Psychological trauma or Abuse: denies   Living Situation/Interest:live at home with boyfriend 3 sons   Education:  associates degree   Marital/Committed relationship and parenting history:  , 3 sons 14, 17, 22  Occupational History:  fulltime- 8 years- at [employer]  Legal History denies   Spiritual History: Confucianism    Past Psychiatric

## 2024-02-07 ENCOUNTER — SOCIAL WORK (OUTPATIENT)
Dept: BEHAVIORAL HEALTH | Facility: CLINIC | Age: 44
End: 2024-02-07
Payer: COMMERCIAL

## 2024-02-07 DIAGNOSIS — F33.1 MODERATE EPISODE OF RECURRENT MAJOR DEPRESSIVE DISORDER (MULTI): ICD-10-CM

## 2024-02-07 DIAGNOSIS — F41.1 GENERALIZED ANXIETY DISORDER: ICD-10-CM

## 2024-02-07 PROCEDURE — 90837 PSYTX W PT 60 MINUTES: CPT

## 2024-02-07 NOTE — PROGRESS NOTES
Therapy Session  Progress Note    Session Type: Virtual    Start Time: 8:00 am  End Time: 8:55 am    Appointment: Scheduled    Reason for Visit: Depression, Anxiety    Patient Symptoms/Interval History: Anxiety has been high, hasn't gone to work for the past week. Has tried Opposite Action skill but unable to make herself do it. Feels sad she let her anxiety get so bad. Willing to up her dose of medications to see if it will help. Concern about losing her job and paying her bills.    Mental Status: Alert & Oriented x3    Functional Status: Severe impairment    Interventions Provided: CBT, Develop Coping Skills    Progress Made: None. The patient has tried doing deep breathing and meditation.     Response to Interventions: The patient was receptive to the interventions provided.    Action Plan/Homework: The patient will call to make an appointment with her psychiatric provider. She will try to use positive affirmations and the Body Awareness exercise.    Treatment Plan: Use CBT to help the patient gain awareness of unhelpful thought patterns and core beliefs. Help the patient learn healthy coping skills to manage her anxiety.    Follow Up/Next Appointment: Follow up in 3 weeks.      NICK Orozco, MEGHANW-S    **This visit was performed using real-time telehealth tools, including an audio/video connection between the patient and this provider. The patient provided consent for the individual telemedicine service and understands the limitations of the visit.

## 2024-02-07 NOTE — PSYCHOTHERAPY
Hasn't been to work since last week.  Drives there daily, went in the gym, then left    Lives by herself and her kids  Thinking about getting another job.   Meds not working, taking full dose of Zoloft    Getting headaches from anxiety  Hasn't heard back from [facility], to call for an appointment  Yelling at her kids    IOP - grounding exercises, meditation, deep breathing, opposite action  No income coming in. Sad it let it get that bad    Building new habits  Body sensation exercise for grounding,   Feels nauseous, headache  Body feels   She gives it power, the nausea    Positive affirmations  Overthinks    Essential oils machine    Call for appt w/ [facility]  Try to do grounding exercise  Positive affirmation videos

## 2024-02-08 ENCOUNTER — TELEPHONE (OUTPATIENT)
Dept: FAMILY MEDICINE CLINIC | Age: 44
End: 2024-02-08

## 2024-02-08 NOTE — TELEPHONE ENCOUNTER
Patient calling in, MARIA R paperwork was faxed over on 2/6/24 - I do not see in media    Patient requesting this get signed and sent back please ASAP. She has received a 5 day termination notice from work and needs this.    Patient phone [phone]    Patient is re-faxing over in case we did not receive.

## 2024-02-19 ENCOUNTER — OFFICE VISIT (OUTPATIENT)
Dept: BEHAVIORAL/MENTAL HEALTH CLINIC | Age: 44
End: 2024-02-19
Payer: COMMERCIAL

## 2024-02-19 VITALS
SYSTOLIC BLOOD PRESSURE: 125 MMHG | WEIGHT: 212 LBS | DIASTOLIC BLOOD PRESSURE: 72 MMHG | HEART RATE: 64 BPM | BODY MASS INDEX: 36.39 KG/M2

## 2024-02-19 DIAGNOSIS — F51.04 PSYCHOPHYSIOLOGICAL INSOMNIA: ICD-10-CM

## 2024-02-19 DIAGNOSIS — F33.1 MDD (MAJOR DEPRESSIVE DISORDER), RECURRENT EPISODE, MODERATE (HCC): ICD-10-CM

## 2024-02-19 DIAGNOSIS — E55.9 VITAMIN D DEFICIENCY: ICD-10-CM

## 2024-02-19 DIAGNOSIS — F41.1 GENERALIZED ANXIETY DISORDER: Primary | ICD-10-CM

## 2024-02-19 PROBLEM — G47.00 INSOMNIA: Status: ACTIVE | Noted: 2024-02-19

## 2024-02-19 PROCEDURE — G8484 FLU IMMUNIZE NO ADMIN: HCPCS | Performed by: REGISTERED NURSE

## 2024-02-19 PROCEDURE — 99214 OFFICE O/P EST MOD 30 MIN: CPT | Performed by: REGISTERED NURSE

## 2024-02-19 PROCEDURE — G8417 CALC BMI ABV UP PARAM F/U: HCPCS | Performed by: REGISTERED NURSE

## 2024-02-19 PROCEDURE — 90833 PSYTX W PT W E/M 30 MIN: CPT | Performed by: REGISTERED NURSE

## 2024-02-19 PROCEDURE — 1036F TOBACCO NON-USER: CPT | Performed by: REGISTERED NURSE

## 2024-02-19 PROCEDURE — G8427 DOCREV CUR MEDS BY ELIG CLIN: HCPCS | Performed by: REGISTERED NURSE

## 2024-02-19 RX ORDER — ONDANSETRON 4 MG/1
TABLET, ORALLY DISINTEGRATING ORAL
Qty: 21 TABLET | Refills: 2 | Status: SHIPPED | OUTPATIENT
Start: 2024-02-19

## 2024-02-19 RX ORDER — GABAPENTIN 100 MG/1
200 CAPSULE ORAL 3 TIMES DAILY
Qty: 180 CAPSULE | Refills: 2 | Status: SHIPPED | OUTPATIENT
Start: 2024-02-19 | End: 2024-08-17

## 2024-02-19 RX ORDER — CLONIDINE HYDROCHLORIDE 0.1 MG/1
0.1 TABLET ORAL 2 TIMES DAILY PRN
Qty: 60 TABLET | Refills: 3 | Status: SHIPPED | OUTPATIENT
Start: 2024-02-19

## 2024-02-19 RX ORDER — TRAZODONE HYDROCHLORIDE 50 MG/1
50 TABLET ORAL NIGHTLY
Qty: 30 TABLET | Refills: 1 | Status: SHIPPED | OUTPATIENT
Start: 2024-02-19

## 2024-02-19 NOTE — PROGRESS NOTES
PSYCHIATRIC MEDICATION MANAGEMENT PROGRESS NOTE  Bobperla Montana, PMHNP    2/19/24  1:37 PM EST   Patient was seen and examined in person, Chart reviewed   Patient's case discussed with other treatment providers       Time spent with Patient: 30 minutes    Chief Complaint: 43 y.o. female seen for follow-up visit for medication management of The primary encounter diagnosis was Generalized anxiety disorder. Diagnoses of Vitamin D deficiency, Psychophysiological insomnia, and MDD (major depressive disorder), recurrent episode, moderate (HCC) were also pertinent to this visit.. Visit attended by patient     Subjective:      Reports the anxiety remains the same, visibly restless.    Continues to express concerns regarding anxiety, sadness, irritability, fatigue, decreased appetite, social withdrawal, feeling overwhelmed, guilt, racing thoughts, poor concretion and memory, head aches, and missing work.    States she has not currently noticed any improvements in symptoms at this time, however expresses great apprehension starting or changing medications.  Pt reports at times can become nauseous.     Denies SI HI AVH; devoid of any delusions.     States hystorectomy back in April 17th, 2023, states anxiety significantly increased since this time.    Reports daily medication compliance     States atarax made her too tired when utilize for anxiety     Patient reports they have been compliant with current medication regimen and have not missed a dose.    Patient denies medication side effects.     At today's visit, patient denies thoughts of harm to self or others since last appointment, and denies auditory or visual hallucinations.       Appetite (since last visit):   [] Normal/Unchanged  [] Increased  [x] Decreased      Sleep (since last visit):   [] Normal/Unchanged  [] Increased  [x] Decreased      Energy:    [x] Normal/Unchanged  [] Increased  [] Decreased        SI [] Present  [x] Absent    HI  []Present  [x] Absent

## 2024-02-23 ENCOUNTER — HOSPITAL ENCOUNTER (OUTPATIENT)
Dept: CT IMAGING | Age: 44
Discharge: HOME | End: 2024-02-23
Payer: COMMERCIAL

## 2024-02-23 ENCOUNTER — TELEPHONE (OUTPATIENT)
Dept: FAMILY MEDICINE CLINIC | Age: 44
End: 2024-02-23

## 2024-02-23 DIAGNOSIS — R51.9 NONINTRACTABLE HEADACHE, UNSPECIFIED CHRONICITY PATTERN, UNSPECIFIED HEADACHE TYPE: ICD-10-CM

## 2024-02-23 PROCEDURE — 70450 CT HEAD/BRAIN W/O DYE: CPT

## 2024-02-23 NOTE — TELEPHONE ENCOUNTER
Elisha Watson [facility] [employer] is asking to have pt last office visit notes for pt along with pt first office visit notes for FMLA. Please fax it over to themfax:[phone]  and if you have any questions please call [phone]

## 2024-02-26 NOTE — TELEPHONE ENCOUNTER
Patient calling in, needs return to work form filled out and signed     Patient will be released to work 29th, needs to present form when she goes back to work     Patient faxing over form - please sign       Patient phone [phone]

## 2024-02-29 ENCOUNTER — APPOINTMENT (OUTPATIENT)
Dept: BEHAVIORAL HEALTH | Facility: CLINIC | Age: 44
End: 2024-02-29
Payer: COMMERCIAL

## 2024-02-29 ENCOUNTER — TELEPHONE (OUTPATIENT)
Dept: FAMILY MEDICINE CLINIC | Age: 44
End: 2024-02-29

## 2024-03-07 ENCOUNTER — OFFICE VISIT (OUTPATIENT)
Dept: BEHAVIORAL/MENTAL HEALTH CLINIC | Age: 44
End: 2024-03-07
Payer: COMMERCIAL

## 2024-03-07 VITALS
BODY MASS INDEX: 36.7 KG/M2 | HEART RATE: 81 BPM | DIASTOLIC BLOOD PRESSURE: 84 MMHG | OXYGEN SATURATION: 98 % | WEIGHT: 215 LBS | TEMPERATURE: 98.2 F | SYSTOLIC BLOOD PRESSURE: 130 MMHG | HEIGHT: 64 IN

## 2024-03-07 DIAGNOSIS — F33.1 MAJOR DEPRESSIVE DISORDER, RECURRENT, MODERATE (HCC): ICD-10-CM

## 2024-03-07 DIAGNOSIS — F51.04 PSYCHOPHYSIOLOGICAL INSOMNIA: ICD-10-CM

## 2024-03-07 DIAGNOSIS — F41.1 GENERALIZED ANXIETY DISORDER: Primary | ICD-10-CM

## 2024-03-07 DIAGNOSIS — E89.0 POSTOPERATIVE HYPOTHYROIDISM: ICD-10-CM

## 2024-03-07 LAB
T4 FREE SERPL-MCNC: 1.12 NG/DL (ref 0.84–1.68)
TSH SERPL-MCNC: 1.67 UIU/ML (ref 0.44–3.86)

## 2024-03-07 PROCEDURE — 1036F TOBACCO NON-USER: CPT | Performed by: REGISTERED NURSE

## 2024-03-07 PROCEDURE — G8417 CALC BMI ABV UP PARAM F/U: HCPCS | Performed by: REGISTERED NURSE

## 2024-03-07 PROCEDURE — 99214 OFFICE O/P EST MOD 30 MIN: CPT | Performed by: REGISTERED NURSE

## 2024-03-07 PROCEDURE — G8484 FLU IMMUNIZE NO ADMIN: HCPCS | Performed by: REGISTERED NURSE

## 2024-03-07 PROCEDURE — G8427 DOCREV CUR MEDS BY ELIG CLIN: HCPCS | Performed by: REGISTERED NURSE

## 2024-03-07 RX ORDER — ONDANSETRON 8 MG/1
TABLET, ORALLY DISINTEGRATING ORAL
Qty: 30 TABLET | Refills: 3 | Status: SHIPPED | OUTPATIENT
Start: 2024-03-07

## 2024-03-07 RX ORDER — SERTRALINE HYDROCHLORIDE 100 MG/1
100 TABLET, FILM COATED ORAL DAILY
Qty: 30 TABLET | Refills: 3 | Status: SHIPPED | OUTPATIENT
Start: 2024-03-07

## 2024-03-07 RX ORDER — QUETIAPINE FUMARATE 25 MG/1
25 TABLET, FILM COATED ORAL DAILY
Qty: 30 TABLET | Refills: 3 | Status: SHIPPED | OUTPATIENT
Start: 2024-03-07

## 2024-03-07 RX ORDER — CLONIDINE HYDROCHLORIDE 0.1 MG/1
0.1 TABLET ORAL 2 TIMES DAILY PRN
Qty: 60 TABLET | Refills: 3 | Status: SHIPPED | OUTPATIENT
Start: 2024-03-07

## 2024-03-07 RX ORDER — GABAPENTIN 100 MG/1
200 CAPSULE ORAL 3 TIMES DAILY
Qty: 180 CAPSULE | Refills: 2 | Status: SHIPPED | OUTPATIENT
Start: 2024-03-07 | End: 2024-09-03

## 2024-03-07 RX ORDER — TRAZODONE HYDROCHLORIDE 50 MG/1
50 TABLET ORAL NIGHTLY
Qty: 30 TABLET | Refills: 1 | Status: SHIPPED | OUTPATIENT
Start: 2024-03-07 | End: 2024-03-07

## 2024-03-07 ASSESSMENT — PATIENT HEALTH QUESTIONNAIRE - PHQ9
6. FEELING BAD ABOUT YOURSELF - OR THAT YOU ARE A FAILURE OR HAVE LET YOURSELF OR YOUR FAMILY DOWN: 1
8. MOVING OR SPEAKING SO SLOWLY THAT OTHER PEOPLE COULD HAVE NOTICED. OR THE OPPOSITE, BEING SO FIGETY OR RESTLESS THAT YOU HAVE BEEN MOVING AROUND A LOT MORE THAN USUAL: 0
6. FEELING BAD ABOUT YOURSELF - OR THAT YOU ARE A FAILURE OR HAVE LET YOURSELF OR YOUR FAMILY DOWN: SEVERAL DAYS
SUM OF ALL RESPONSES TO PHQ QUESTIONS 1-9: 15
2. FEELING DOWN, DEPRESSED OR HOPELESS: 3
9. THOUGHTS THAT YOU WOULD BE BETTER OFF DEAD, OR OF HURTING YOURSELF: NOT AT ALL
4. FEELING TIRED OR HAVING LITTLE ENERGY: NEARLY EVERY DAY
1. LITTLE INTEREST OR PLEASURE IN DOING THINGS: MORE THAN HALF THE DAYS
SUM OF ALL RESPONSES TO PHQ QUESTIONS 1-9: 15
5. POOR APPETITE OR OVEREATING: NEARLY EVERY DAY
5. POOR APPETITE OR OVEREATING: 3
9. THOUGHTS THAT YOU WOULD BE BETTER OFF DEAD, OR OF HURTING YOURSELF: 0
SUM OF ALL RESPONSES TO PHQ QUESTIONS 1-9: 15
SUM OF ALL RESPONSES TO PHQ9 QUESTIONS 1 & 2: 5
SUM OF ALL RESPONSES TO PHQ QUESTIONS 1-9: 15
7. TROUBLE CONCENTRATING ON THINGS, SUCH AS READING THE NEWSPAPER OR WATCHING TELEVISION: 1
10. IF YOU CHECKED OFF ANY PROBLEMS, HOW DIFFICULT HAVE THESE PROBLEMS MADE IT FOR YOU TO DO YOUR WORK, TAKE CARE OF THINGS AT HOME, OR GET ALONG WITH OTHER PEOPLE: VERY DIFFICULT
8. MOVING OR SPEAKING SO SLOWLY THAT OTHER PEOPLE COULD HAVE NOTICED. OR THE OPPOSITE - BEING SO FIDGETY OR RESTLESS THAT YOU HAVE BEEN MOVING AROUND A LOT MORE THAN USUAL: NOT AT ALL
1. LITTLE INTEREST OR PLEASURE IN DOING THINGS: 2
3. TROUBLE FALLING OR STAYING ASLEEP: 2
SUM OF ALL RESPONSES TO PHQ QUESTIONS 1-9: 15
3. TROUBLE FALLING OR STAYING ASLEEP: MORE THAN HALF THE DAYS
2. FEELING DOWN, DEPRESSED OR HOPELESS: NEARLY EVERY DAY
7. TROUBLE CONCENTRATING ON THINGS, SUCH AS READING THE NEWSPAPER OR WATCHING TELEVISION: SEVERAL DAYS
10. IF YOU CHECKED OFF ANY PROBLEMS, HOW DIFFICULT HAVE THESE PROBLEMS MADE IT FOR YOU TO DO YOUR WORK, TAKE CARE OF THINGS AT HOME, OR GET ALONG WITH OTHER PEOPLE: 2

## 2024-03-07 ASSESSMENT — ANXIETY QUESTIONNAIRES
7. FEELING AFRAID AS IF SOMETHING AWFUL MIGHT HAPPEN: NEARLY EVERY DAY
7. FEELING AFRAID AS IF SOMETHING AWFUL MIGHT HAPPEN: 3
4. TROUBLE RELAXING: 3
IF YOU CHECKED OFF ANY PROBLEMS ON THIS QUESTIONNAIRE, HOW DIFFICULT HAVE THESE PROBLEMS MADE IT FOR YOU TO DO YOUR WORK, TAKE CARE OF THINGS AT HOME, OR GET ALONG WITH OTHER PEOPLE: EXTREMELY DIFFICULT
2. NOT BEING ABLE TO STOP OR CONTROL WORRYING: 3
3. WORRYING TOO MUCH ABOUT DIFFERENT THINGS: 3
5. BEING SO RESTLESS THAT IT IS HARD TO SIT STILL: 2
GAD7 TOTAL SCORE: 20
6. BECOMING EASILY ANNOYED OR IRRITABLE: 3
5. BEING SO RESTLESS THAT IT IS HARD TO SIT STILL: MORE THAN HALF THE DAYS
6. BECOMING EASILY ANNOYED OR IRRITABLE: NEARLY EVERY DAY
3. WORRYING TOO MUCH ABOUT DIFFERENT THINGS: NEARLY EVERY DAY
2. NOT BEING ABLE TO STOP OR CONTROL WORRYING: NEARLY EVERY DAY
IF YOU CHECKED OFF ANY PROBLEMS ON THIS QUESTIONNAIRE, HOW DIFFICULT HAVE THESE PROBLEMS MADE IT FOR YOU TO DO YOUR WORK, TAKE CARE OF THINGS AT HOME, OR GET ALONG WITH OTHER PEOPLE: EXTREMELY DIFFICULT
1. FEELING NERVOUS, ANXIOUS, OR ON EDGE: NEARLY EVERY DAY
1. FEELING NERVOUS, ANXIOUS, OR ON EDGE: 3
4. TROUBLE RELAXING: NEARLY EVERY DAY

## 2024-03-12 PROBLEM — Z79.899 OTHER LONG TERM (CURRENT) DRUG THERAPY: Status: ACTIVE | Noted: 2023-11-16

## 2024-03-12 PROBLEM — F42.4 EXCORIATION (SKIN-PICKING) DISORDER: Status: ACTIVE | Noted: 2023-10-24

## 2024-03-15 ENCOUNTER — TELEPHONE (OUTPATIENT)
Dept: FAMILY MEDICINE CLINIC | Age: 44
End: 2024-03-15

## 2024-03-15 NOTE — TELEPHONE ENCOUNTER
Patient called and is stating that [facility] faxed paperwork over that needs to be filled out and signed, it was faxed on Monday.  She is asking status update on this paperwork.    She is asking for a call back with information.

## 2024-03-18 NOTE — TELEPHONE ENCOUNTER
Called Elisha from [facility] back and left VM that Dr. Zurita is over Bob Montana and the tax ID number is [id number].

## 2024-03-20 ASSESSMENT — PATIENT HEALTH QUESTIONNAIRE - PHQ9
SUM OF ALL RESPONSES TO PHQ QUESTIONS 1-9: 19
6. FEELING BAD ABOUT YOURSELF - OR THAT YOU ARE A FAILURE OR HAVE LET YOURSELF OR YOUR FAMILY DOWN: NEARLY EVERY DAY
5. POOR APPETITE OR OVEREATING: NEARLY EVERY DAY
SUM OF ALL RESPONSES TO PHQ QUESTIONS 1-9: 19
1. LITTLE INTEREST OR PLEASURE IN DOING THINGS: NEARLY EVERY DAY
10. IF YOU CHECKED OFF ANY PROBLEMS, HOW DIFFICULT HAVE THESE PROBLEMS MADE IT FOR YOU TO DO YOUR WORK, TAKE CARE OF THINGS AT HOME, OR GET ALONG WITH OTHER PEOPLE: EXTREMELY DIFFICULT
7. TROUBLE CONCENTRATING ON THINGS, SUCH AS READING THE NEWSPAPER OR WATCHING TELEVISION: MORE THAN HALF THE DAYS
SUM OF ALL RESPONSES TO PHQ QUESTIONS 1-9: 19
8. MOVING OR SPEAKING SO SLOWLY THAT OTHER PEOPLE COULD HAVE NOTICED. OR THE OPPOSITE - BEING SO FIDGETY OR RESTLESS THAT YOU HAVE BEEN MOVING AROUND A LOT MORE THAN USUAL: NOT AT ALL
3. TROUBLE FALLING OR STAYING ASLEEP: MORE THAN HALF THE DAYS
2. FEELING DOWN, DEPRESSED OR HOPELESS: NEARLY EVERY DAY
7. TROUBLE CONCENTRATING ON THINGS, SUCH AS READING THE NEWSPAPER OR WATCHING TELEVISION: MORE THAN HALF THE DAYS
2. FEELING DOWN, DEPRESSED OR HOPELESS: NEARLY EVERY DAY
4. FEELING TIRED OR HAVING LITTLE ENERGY: NEARLY EVERY DAY
SUM OF ALL RESPONSES TO PHQ9 QUESTIONS 1 & 2: 6
9. THOUGHTS THAT YOU WOULD BE BETTER OFF DEAD, OR OF HURTING YOURSELF: NOT AT ALL
1. LITTLE INTEREST OR PLEASURE IN DOING THINGS: NEARLY EVERY DAY
9. THOUGHTS THAT YOU WOULD BE BETTER OFF DEAD, OR OF HURTING YOURSELF: NOT AT ALL
6. FEELING BAD ABOUT YOURSELF - OR THAT YOU ARE A FAILURE OR HAVE LET YOURSELF OR YOUR FAMILY DOWN: NEARLY EVERY DAY
10. IF YOU CHECKED OFF ANY PROBLEMS, HOW DIFFICULT HAVE THESE PROBLEMS MADE IT FOR YOU TO DO YOUR WORK, TAKE CARE OF THINGS AT HOME, OR GET ALONG WITH OTHER PEOPLE: EXTREMELY DIFFICULT
3. TROUBLE FALLING OR STAYING ASLEEP: MORE THAN HALF THE DAYS
4. FEELING TIRED OR HAVING LITTLE ENERGY: NEARLY EVERY DAY
SUM OF ALL RESPONSES TO PHQ QUESTIONS 1-9: 19
5. POOR APPETITE OR OVEREATING: NEARLY EVERY DAY
SUM OF ALL RESPONSES TO PHQ QUESTIONS 1-9: 19

## 2024-03-20 ASSESSMENT — ANXIETY QUESTIONNAIRES
7. FEELING AFRAID AS IF SOMETHING AWFUL MIGHT HAPPEN: MORE THAN HALF THE DAYS
7. FEELING AFRAID AS IF SOMETHING AWFUL MIGHT HAPPEN: MORE THAN HALF THE DAYS
3. WORRYING TOO MUCH ABOUT DIFFERENT THINGS: NEARLY EVERY DAY
GAD7 TOTAL SCORE: 18
IF YOU CHECKED OFF ANY PROBLEMS ON THIS QUESTIONNAIRE, HOW DIFFICULT HAVE THESE PROBLEMS MADE IT FOR YOU TO DO YOUR WORK, TAKE CARE OF THINGS AT HOME, OR GET ALONG WITH OTHER PEOPLE: VERY DIFFICULT
6. BECOMING EASILY ANNOYED OR IRRITABLE: NEARLY EVERY DAY
2. NOT BEING ABLE TO STOP OR CONTROL WORRYING: NEARLY EVERY DAY
1. FEELING NERVOUS, ANXIOUS, OR ON EDGE: NEARLY EVERY DAY
4. TROUBLE RELAXING: MORE THAN HALF THE DAYS
6. BECOMING EASILY ANNOYED OR IRRITABLE: NEARLY EVERY DAY
1. FEELING NERVOUS, ANXIOUS, OR ON EDGE: NEARLY EVERY DAY
2. NOT BEING ABLE TO STOP OR CONTROL WORRYING: NEARLY EVERY DAY
3. WORRYING TOO MUCH ABOUT DIFFERENT THINGS: NEARLY EVERY DAY
4. TROUBLE RELAXING: MORE THAN HALF THE DAYS
5. BEING SO RESTLESS THAT IT IS HARD TO SIT STILL: MORE THAN HALF THE DAYS
IF YOU CHECKED OFF ANY PROBLEMS ON THIS QUESTIONNAIRE, HOW DIFFICULT HAVE THESE PROBLEMS MADE IT FOR YOU TO DO YOUR WORK, TAKE CARE OF THINGS AT HOME, OR GET ALONG WITH OTHER PEOPLE: VERY DIFFICULT
5. BEING SO RESTLESS THAT IT IS HARD TO SIT STILL: MORE THAN HALF THE DAYS

## 2024-03-22 ENCOUNTER — TELEMEDICINE (OUTPATIENT)
Dept: BEHAVIORAL/MENTAL HEALTH CLINIC | Age: 44
End: 2024-03-22
Payer: COMMERCIAL

## 2024-03-22 DIAGNOSIS — F40.01 AGORAPHOBIA WITH PANIC DISORDER: Primary | ICD-10-CM

## 2024-03-22 DIAGNOSIS — F32.A DEPRESSIVE DISORDER: ICD-10-CM

## 2024-03-22 DIAGNOSIS — F51.04 PSYCHOPHYSIOLOGICAL INSOMNIA: ICD-10-CM

## 2024-03-22 DIAGNOSIS — F41.1 GENERALIZED ANXIETY DISORDER: ICD-10-CM

## 2024-03-22 PROCEDURE — G8417 CALC BMI ABV UP PARAM F/U: HCPCS | Performed by: REGISTERED NURSE

## 2024-03-22 PROCEDURE — G8484 FLU IMMUNIZE NO ADMIN: HCPCS | Performed by: REGISTERED NURSE

## 2024-03-22 PROCEDURE — 1036F TOBACCO NON-USER: CPT | Performed by: REGISTERED NURSE

## 2024-03-22 PROCEDURE — 90833 PSYTX W PT W E/M 30 MIN: CPT | Performed by: REGISTERED NURSE

## 2024-03-22 PROCEDURE — 99214 OFFICE O/P EST MOD 30 MIN: CPT | Performed by: REGISTERED NURSE

## 2024-03-22 PROCEDURE — G8427 DOCREV CUR MEDS BY ELIG CLIN: HCPCS | Performed by: REGISTERED NURSE

## 2024-03-22 RX ORDER — SERTRALINE HYDROCHLORIDE 100 MG/1
100 TABLET, FILM COATED ORAL DAILY
Qty: 30 TABLET | Refills: 3 | Status: SHIPPED | OUTPATIENT
Start: 2024-03-22

## 2024-03-22 RX ORDER — CLONIDINE HYDROCHLORIDE 0.1 MG/1
0.1 TABLET ORAL 2 TIMES DAILY PRN
Qty: 60 TABLET | Refills: 3 | Status: SHIPPED | OUTPATIENT
Start: 2024-03-22

## 2024-03-22 RX ORDER — QUETIAPINE FUMARATE 50 MG/1
50 TABLET, FILM COATED ORAL DAILY
Qty: 30 TABLET | Refills: 2 | Status: SHIPPED | OUTPATIENT
Start: 2024-03-22

## 2024-03-22 NOTE — PROGRESS NOTES
PSYCHIATRIC MEDICATION MANAGEMENT PROGRESS NOTE  Bob Montana PMHNP    3/22/24  9:10 AM EDT    Follow UP TELEHEALTH PSYCHIATRIC EVALUATION (OUTPATIENT)  Audio/Visual (During COVID-19 public health emergency)        32 minute minutes total for encounter: including time spent in direct communication with patient and time spent on chart review, and in the ordering of all necessary labs and/or medications        Amy M Irizarry, was evaluated through a synchronous (real-time) audio-video encounter. The patient (or guardian if applicable) is aware that this is a billable service, which includes applicable co-pays. This Virtual Visit was conducted with patient's (and/or legal guardian's) consent. Patient identification was verified, and a caregiver was present when appropriate.   The patient was located at Home: [address] [place] [address]  Provider was located at Facility (Appt Dept): [address] [place] [address]       Total time spent for this encounter:  32 minutes    --Bob Montana, APRN - CNP on 3/22/2024 at 9:10 AM    An electronic signature was used to authenticate this note.     Chief Complaint: 43 y.o. female seen for follow-up visit for medication management of The primary encounter diagnosis was Agoraphobia with panic disorder. Diagnoses of Generalized anxiety disorder, Psychophysiological insomnia, and Depressive disorder were also pertinent to this visit.. Visit attended by patient     Subjective:      States things are going significantly worse. Increased racing thoughts, feeling worried, impending doom.  States she believes she may have been fired, perseverating on possible outcome of negative outcome.      Reports new onset headache, upset stomach, nausea. Increased somatic conerns, states attempted to go to gym states I thing my leg feels weird, what if I have to go to the bathroom, maybe I don't feel well.    States there are just a lot of things on my plate.    Endorses negative

## 2024-04-01 ENCOUNTER — OFFICE VISIT (OUTPATIENT)
Dept: NEUROLOGY | Age: 44
End: 2024-04-01
Payer: COMMERCIAL

## 2024-04-01 ENCOUNTER — PATIENT MESSAGE (OUTPATIENT)
Dept: NEUROLOGY | Age: 44
End: 2024-04-01

## 2024-04-01 VITALS
SYSTOLIC BLOOD PRESSURE: 120 MMHG | WEIGHT: 217 LBS | HEART RATE: 67 BPM | BODY MASS INDEX: 37.25 KG/M2 | DIASTOLIC BLOOD PRESSURE: 62 MMHG

## 2024-04-01 DIAGNOSIS — G37.3 TRANSVERSE MYELITIS (HCC): ICD-10-CM

## 2024-04-01 DIAGNOSIS — R26.0 ATAXIC GAIT: ICD-10-CM

## 2024-04-01 DIAGNOSIS — M54.2 CERVICALGIA: ICD-10-CM

## 2024-04-01 DIAGNOSIS — G43.109 MIGRAINE WITH AURA AND WITHOUT STATUS MIGRAINOSUS, NOT INTRACTABLE: Primary | ICD-10-CM

## 2024-04-01 PROCEDURE — G8417 CALC BMI ABV UP PARAM F/U: HCPCS | Performed by: PSYCHIATRY & NEUROLOGY

## 2024-04-01 PROCEDURE — 99214 OFFICE O/P EST MOD 30 MIN: CPT | Performed by: PSYCHIATRY & NEUROLOGY

## 2024-04-01 PROCEDURE — G8427 DOCREV CUR MEDS BY ELIG CLIN: HCPCS | Performed by: PSYCHIATRY & NEUROLOGY

## 2024-04-01 PROCEDURE — 1036F TOBACCO NON-USER: CPT | Performed by: PSYCHIATRY & NEUROLOGY

## 2024-04-01 RX ORDER — IBUPROFEN 400 MG/1
TABLET ORAL
COMMUNITY
Start: 2024-01-31

## 2024-04-01 RX ORDER — LIDOCAINE PAIN RELIEF 40 MG/1000MG
PATCH TOPICAL
COMMUNITY
Start: 2024-03-07

## 2024-04-01 ASSESSMENT — ENCOUNTER SYMPTOMS
COLOR CHANGE: 0
TROUBLE SWALLOWING: 0
NAUSEA: 0
VOMITING: 0
BACK PAIN: 0
SHORTNESS OF BREATH: 0
PHOTOPHOBIA: 0

## 2024-04-01 NOTE — PROGRESS NOTES
Subjective:      Patient ID: Amy Irizarry is a 43 y.o. female who presents today for:  Chief Complaint   Patient presents with    Follow-up     Pt states thing are worst than before. Pt states sometimes her fingers get stiff and she can not lift it and her arm feel constricted and tight. Pt states random it feels like someone is stabbing her with a needle. Pt states she has more headaches on the left side in the back pt states her nausea is getting worst        HPI 43-year-old right-handed female with a history of migraine headaches with gait ataxia dizziness lightheadedness.  History of an abnormal MRI.  Patient continues on Seroquel and on Antivert with scopolamine patch.  We have not seen her since June 2022.  In the interim she has been seeing neurology Dr. Celestina Saba  at [facility].  We had seen her for the migraine headaches and she had an MRI of the brain and internal auditory canals and all supposed to be normal.  She has ataxic gait with vertigo.  Previous history of optic neuropathy or neuritis with urinary urge incontinence.  MRI showed nonspecific white matter changes and patient to complete a spinal evaluation with an M antibody titers are negative with a cervical spine MRI with gadolinium and MOG was negative.  Clinical findings more suspicious for vasculopathy of migraine headaches.  Patient reports things are worse than before.  She has stiffness and not able to lift and feels tight.  Patient has headaches on the left.  Patient appears to be frustrated regarding her findings and we have discussed that she had a complete workup with her neurologist and we have not found anything concrete except for the minor white matter changes on the MRI.  She has never had a lumbar puncture though.    Past Medical History:   Diagnosis Date    Anxiety     Arthritis     Depression     PONV (postoperative nausea and vomiting)     Thyroid disease      Past Surgical History:   Procedure Laterality Date

## 2024-04-05 ENCOUNTER — OFFICE VISIT (OUTPATIENT)
Dept: OBGYN CLINIC | Age: 44
End: 2024-04-05
Payer: COMMERCIAL

## 2024-04-05 VITALS
BODY MASS INDEX: 37.39 KG/M2 | WEIGHT: 219 LBS | HEIGHT: 64 IN | DIASTOLIC BLOOD PRESSURE: 72 MMHG | SYSTOLIC BLOOD PRESSURE: 118 MMHG

## 2024-04-05 DIAGNOSIS — R63.5 WEIGHT GAIN: ICD-10-CM

## 2024-04-05 DIAGNOSIS — R23.2 HOT FLASHES: Primary | ICD-10-CM

## 2024-04-05 DIAGNOSIS — F41.9 ANXIETY: ICD-10-CM

## 2024-04-05 DIAGNOSIS — R23.2 HOT FLASHES: ICD-10-CM

## 2024-04-05 PROCEDURE — 99213 OFFICE O/P EST LOW 20 MIN: CPT | Performed by: OBSTETRICS & GYNECOLOGY

## 2024-04-05 PROCEDURE — 1036F TOBACCO NON-USER: CPT | Performed by: OBSTETRICS & GYNECOLOGY

## 2024-04-05 PROCEDURE — G8427 DOCREV CUR MEDS BY ELIG CLIN: HCPCS | Performed by: OBSTETRICS & GYNECOLOGY

## 2024-04-05 PROCEDURE — G8417 CALC BMI ABV UP PARAM F/U: HCPCS | Performed by: OBSTETRICS & GYNECOLOGY

## 2024-04-05 SDOH — ECONOMIC STABILITY: FOOD INSECURITY: WITHIN THE PAST 12 MONTHS, THE FOOD YOU BOUGHT JUST DIDN'T LAST AND YOU DIDN'T HAVE MONEY TO GET MORE.: NEVER TRUE

## 2024-04-05 SDOH — ECONOMIC STABILITY: FOOD INSECURITY: WITHIN THE PAST 12 MONTHS, YOU WORRIED THAT YOUR FOOD WOULD RUN OUT BEFORE YOU GOT MONEY TO BUY MORE.: NEVER TRUE

## 2024-04-05 SDOH — ECONOMIC STABILITY: INCOME INSECURITY: HOW HARD IS IT FOR YOU TO PAY FOR THE VERY BASICS LIKE FOOD, HOUSING, MEDICAL CARE, AND HEATING?: NOT HARD AT ALL

## 2024-04-05 NOTE — PROGRESS NOTES
Subjective:      Patient ID:  Amy Irizarry is a 43 y.o. female with chief complaint of:  Chief Complaint   Patient presents with    Hormone Issues     Pt here to talk about some hormonal issues she has been having since her hysterectomy        Patient is status post hysterectomy however she still has at least 1 ovary.  Hormonal status is questionable depending upon lab work patient is unsure about family history for menopause.  Patient is having hot flashes and night sweats the real issue has been related to her anxiety and depression which is seem to heighten in the last few months to the point that she has not been able to be at work        Past Medical History:   Diagnosis Date    Anxiety     Arthritis     Depression     PONV (postoperative nausea and vomiting)     Thyroid disease      Past Surgical History:   Procedure Laterality Date    COLONOSCOPY N/A 12/30/2022    COLONOSCOPY DIAGNOSTIC performed by Lonnie Lopez MD at [facility]    DENTAL SURGERY      HYSTERECTOMY, TOTAL ABDOMINAL (CERVIX REMOVED) N/A 4/17/2023    EUA, TOTAL ABDOMINAL HYSTERECTOMY WITH BILATERAL SALPINGECTOMY, RIGHT OOPHORECTOMY performed [place]z [place] [facility] [place]    THYROIDECTOMY      TUBAL LIGATION      UPPER GASTROINTESTINAL ENDOSCOPY N/A 12/30/2022    EGD ESOPHAGOGASTRODUODENOSCOPY performed by Lonnie Lopez MD at [facility]    US BREAST FINE NEEDLE ASPIRATION Right      Family History   Problem Relation Age of Onset    No Known Problems Mother     Drug Abuse Father     Schizophrenia Maternal Aunt     Bipolar Disorder Maternal Aunt     Colon Cancer Paternal Grandfather      Current Outpatient Medications on File Prior to Visit   Medication Sig Dispense Refill    ibuprofen (ADVIL;MOTRIN) 400 MG tablet TAKE 1 TABLET BY MOUTH EVERY 6 TO 8 HOURS AS NEEDED      LIDOCAINE PAIN RELIEF 4 % external patch APPLY 1 patch to lower back for 12 hours then remove for 12 hours      QUEtiapine (SEROQUEL) 50 MG tablet

## 2024-04-06 LAB
ESTRADIOL LEVEL: 16 PG/ML
FOLLICLE STIMULATING HORMONE: 7 MIU/ML
INSULIN COMMENT: NORMAL
INSULIN REFERENCE RANGE:: NORMAL
INSULIN SERPL-ACNC: 27.1 MU/L

## 2024-04-06 ASSESSMENT — ENCOUNTER SYMPTOMS
SHORTNESS OF BREATH: 0
ABDOMINAL PAIN: 0
APNEA: 0

## 2024-04-12 ENCOUNTER — OFFICE VISIT (OUTPATIENT)
Dept: BEHAVIORAL/MENTAL HEALTH CLINIC | Age: 44
End: 2024-04-12
Payer: COMMERCIAL

## 2024-04-12 VITALS
BODY MASS INDEX: 37.73 KG/M2 | HEART RATE: 62 BPM | WEIGHT: 221 LBS | HEIGHT: 64 IN | DIASTOLIC BLOOD PRESSURE: 87 MMHG | SYSTOLIC BLOOD PRESSURE: 127 MMHG

## 2024-04-12 DIAGNOSIS — F51.04 PSYCHOPHYSIOLOGICAL INSOMNIA: ICD-10-CM

## 2024-04-12 DIAGNOSIS — F40.01 AGORAPHOBIA WITH PANIC DISORDER: Primary | ICD-10-CM

## 2024-04-12 DIAGNOSIS — F41.9 ANXIETY: ICD-10-CM

## 2024-04-12 DIAGNOSIS — F32.A DEPRESSIVE DISORDER: ICD-10-CM

## 2024-04-12 PROCEDURE — G8417 CALC BMI ABV UP PARAM F/U: HCPCS | Performed by: REGISTERED NURSE

## 2024-04-12 PROCEDURE — 90833 PSYTX W PT W E/M 30 MIN: CPT | Performed by: REGISTERED NURSE

## 2024-04-12 PROCEDURE — 1036F TOBACCO NON-USER: CPT | Performed by: REGISTERED NURSE

## 2024-04-12 PROCEDURE — 99214 OFFICE O/P EST MOD 30 MIN: CPT | Performed by: REGISTERED NURSE

## 2024-04-12 PROCEDURE — G8427 DOCREV CUR MEDS BY ELIG CLIN: HCPCS | Performed by: REGISTERED NURSE

## 2024-04-12 RX ORDER — CLONIDINE HYDROCHLORIDE 0.1 MG/1
0.1 TABLET ORAL 2 TIMES DAILY PRN
Qty: 60 TABLET | Refills: 3 | Status: SHIPPED | OUTPATIENT
Start: 2024-04-12

## 2024-04-12 RX ORDER — GABAPENTIN 100 MG/1
200 CAPSULE ORAL 3 TIMES DAILY
Qty: 180 CAPSULE | Refills: 2 | Status: SHIPPED | OUTPATIENT
Start: 2024-04-12 | End: 2024-10-09

## 2024-04-12 RX ORDER — SERTRALINE HYDROCHLORIDE 100 MG/1
100 TABLET, FILM COATED ORAL DAILY
Qty: 30 TABLET | Refills: 3 | Status: SHIPPED | OUTPATIENT
Start: 2024-04-12

## 2024-04-12 RX ORDER — QUETIAPINE FUMARATE 50 MG/1
50 TABLET, FILM COATED ORAL DAILY
Qty: 30 TABLET | Refills: 2 | Status: SHIPPED | OUTPATIENT
Start: 2024-04-12

## 2024-04-12 ASSESSMENT — PATIENT HEALTH QUESTIONNAIRE - PHQ9
SUM OF ALL RESPONSES TO PHQ QUESTIONS 1-9: 20
1. LITTLE INTEREST OR PLEASURE IN DOING THINGS: NEARLY EVERY DAY
2. FEELING DOWN, DEPRESSED OR HOPELESS: NEARLY EVERY DAY
SUM OF ALL RESPONSES TO PHQ QUESTIONS 1-9: 20
1. LITTLE INTEREST OR PLEASURE IN DOING THINGS: NEARLY EVERY DAY
9. THOUGHTS THAT YOU WOULD BE BETTER OFF DEAD, OR OF HURTING YOURSELF: NOT AT ALL
5. POOR APPETITE OR OVEREATING: NEARLY EVERY DAY
SUM OF ALL RESPONSES TO PHQ QUESTIONS 1-9: 20
SUM OF ALL RESPONSES TO PHQ QUESTIONS 1-9: 20
10. IF YOU CHECKED OFF ANY PROBLEMS, HOW DIFFICULT HAVE THESE PROBLEMS MADE IT FOR YOU TO DO YOUR WORK, TAKE CARE OF THINGS AT HOME, OR GET ALONG WITH OTHER PEOPLE: VERY DIFFICULT
8. MOVING OR SPEAKING SO SLOWLY THAT OTHER PEOPLE COULD HAVE NOTICED. OR THE OPPOSITE, BEING SO FIGETY OR RESTLESS THAT YOU HAVE BEEN MOVING AROUND A LOT MORE THAN USUAL: SEVERAL DAYS
3. TROUBLE FALLING OR STAYING ASLEEP: MORE THAN HALF THE DAYS
7. TROUBLE CONCENTRATING ON THINGS, SUCH AS READING THE NEWSPAPER OR WATCHING TELEVISION: MORE THAN HALF THE DAYS
4. FEELING TIRED OR HAVING LITTLE ENERGY: NEARLY EVERY DAY
SUM OF ALL RESPONSES TO PHQ QUESTIONS 1-9: 20
5. POOR APPETITE OR OVEREATING: NEARLY EVERY DAY
6. FEELING BAD ABOUT YOURSELF - OR THAT YOU ARE A FAILURE OR HAVE LET YOURSELF OR YOUR FAMILY DOWN: NEARLY EVERY DAY
10. IF YOU CHECKED OFF ANY PROBLEMS, HOW DIFFICULT HAVE THESE PROBLEMS MADE IT FOR YOU TO DO YOUR WORK, TAKE CARE OF THINGS AT HOME, OR GET ALONG WITH OTHER PEOPLE: VERY DIFFICULT
7. TROUBLE CONCENTRATING ON THINGS, SUCH AS READING THE NEWSPAPER OR WATCHING TELEVISION: MORE THAN HALF THE DAYS
2. FEELING DOWN, DEPRESSED OR HOPELESS: NEARLY EVERY DAY
4. FEELING TIRED OR HAVING LITTLE ENERGY: NEARLY EVERY DAY
8. MOVING OR SPEAKING SO SLOWLY THAT OTHER PEOPLE COULD HAVE NOTICED. OR THE OPPOSITE - BEING SO FIDGETY OR RESTLESS THAT YOU HAVE BEEN MOVING AROUND A LOT MORE THAN USUAL: SEVERAL DAYS
SUM OF ALL RESPONSES TO PHQ9 QUESTIONS 1 & 2: 6
6. FEELING BAD ABOUT YOURSELF - OR THAT YOU ARE A FAILURE OR HAVE LET YOURSELF OR YOUR FAMILY DOWN: NEARLY EVERY DAY
9. THOUGHTS THAT YOU WOULD BE BETTER OFF DEAD, OR OF HURTING YOURSELF: NOT AT ALL
3. TROUBLE FALLING OR STAYING ASLEEP: MORE THAN HALF THE DAYS

## 2024-04-12 ASSESSMENT — ANXIETY QUESTIONNAIRES
IF YOU CHECKED OFF ANY PROBLEMS ON THIS QUESTIONNAIRE, HOW DIFFICULT HAVE THESE PROBLEMS MADE IT FOR YOU TO DO YOUR WORK, TAKE CARE OF THINGS AT HOME, OR GET ALONG WITH OTHER PEOPLE: EXTREMELY DIFFICULT
3. WORRYING TOO MUCH ABOUT DIFFERENT THINGS: NEARLY EVERY DAY
6. BECOMING EASILY ANNOYED OR IRRITABLE: NEARLY EVERY DAY
4. TROUBLE RELAXING: NEARLY EVERY DAY
IF YOU CHECKED OFF ANY PROBLEMS ON THIS QUESTIONNAIRE, HOW DIFFICULT HAVE THESE PROBLEMS MADE IT FOR YOU TO DO YOUR WORK, TAKE CARE OF THINGS AT HOME, OR GET ALONG WITH OTHER PEOPLE: EXTREMELY DIFFICULT
7. FEELING AFRAID AS IF SOMETHING AWFUL MIGHT HAPPEN: MORE THAN HALF THE DAYS
2. NOT BEING ABLE TO STOP OR CONTROL WORRYING: NEARLY EVERY DAY
6. BECOMING EASILY ANNOYED OR IRRITABLE: NEARLY EVERY DAY
1. FEELING NERVOUS, ANXIOUS, OR ON EDGE: NEARLY EVERY DAY
GAD7 TOTAL SCORE: 17
1. FEELING NERVOUS, ANXIOUS, OR ON EDGE: NEARLY EVERY DAY
4. TROUBLE RELAXING: NEARLY EVERY DAY
5. BEING SO RESTLESS THAT IT IS HARD TO SIT STILL: NOT AT ALL
5. BEING SO RESTLESS THAT IT IS HARD TO SIT STILL: NOT AT ALL
3. WORRYING TOO MUCH ABOUT DIFFERENT THINGS: NEARLY EVERY DAY
7. FEELING AFRAID AS IF SOMETHING AWFUL MIGHT HAPPEN: MORE THAN HALF THE DAYS
2. NOT BEING ABLE TO STOP OR CONTROL WORRYING: NEARLY EVERY DAY

## 2024-04-12 ASSESSMENT — COLUMBIA-SUICIDE SEVERITY RATING SCALE - C-SSRS
1. IN THE PAST MONTH, HAVE YOU WISHED YOU WERE DEAD OR WISHED YOU COULD GO TO SLEEP AND NOT WAKE UP?: NO
6. IN YOUR LIFETIME, HAVE YOU EVER DONE ANYTHING, STARTED TO DO ANYTHING, OR PREPARED TO DO ANYTHING TO END YOUR LIFE?: NO
2. IN THE PAST MONTH, HAVE YOU ACTUALLY HAD ANY THOUGHTS OF KILLING YOURSELF?: NO

## 2024-04-12 NOTE — PROGRESS NOTES
Low vitamin D level    Multilevel neural foraminal stenosis    PCOS (polycystic ovarian syndrome)    Postoperative hypothyroidism    Radicular syndrome of right leg    Vitamin D deficiency    Migraine with aura and without status migrainosus, not intractable    Ataxic gait    Lightheaded    Sleep apnea    Abnormal electrocardiography    Chest pain    Fatigue    Normal left ventricular systolic function and wall motion    Overweight    Palpitations    Premature atrial contraction    Snoring    Transverse myelitis (HCC)    Abnormal MRI    Blood per rectum    Gastritis without bleeding    Polyp of colon    Nausea    Irritable bowel syndrome with constipation    Pelvic pain in female    Fibroids, intramural    S/P total abdominal hysterectomy    Agoraphobia with panic disorder    Anxiety    Depressive disorder    Generalized anxiety disorder    Impacted cerumen    Impairment of balance    Major depressive disorder, recurrent, moderate (HCC)    Upper extremity pain, central, left    Insomnia    Excoriation (skin-picking) disorder    Other long term (current) drug therapy    Cervicalgia     Psychiatric Diagnoses:  1. Agoraphobia with panic disorder    2. Anxiety    3. Depressive disorder    4. Psychophysiological insomnia        Plan:    Start/Continue     Continue  Seroquel 50mg QHS  Continue Zoloft 100mg QAM   Continues Clonidine 0.1mg BID PRN  Continue Gabapentin 100mg TID     Counselor at [facility]- weekly     No Labs ordered today   Crisis plan reviewed and patient verbally contracts for safety.  Go to ED with emergent symptoms or safety concerns.  Risks, benefits, side effects of medications, including any / all black box warnings, discussed with patient, who verbalizes their understanding.     Patient denies any thoughts of harm to self and denies any acute safety concerns remains appropriate for outpatient level of care. Will continue to reassess with each appointment.     Reviewed current Medications with the

## 2024-04-16 ENCOUNTER — TELEPHONE (OUTPATIENT)
Dept: FAMILY MEDICINE CLINIC | Age: 44
End: 2024-04-16

## 2024-04-16 NOTE — TELEPHONE ENCOUNTER
PT called- states her employer states that they have not rec'd her medical forms for her leave.    Please fax to  [phone]  Please include name & claim # [id number]

## 2024-04-26 ENCOUNTER — OFFICE VISIT (OUTPATIENT)
Dept: BEHAVIORAL/MENTAL HEALTH CLINIC | Age: 44
End: 2024-04-26
Payer: COMMERCIAL

## 2024-04-26 VITALS — BODY MASS INDEX: 37.87 KG/M2 | WEIGHT: 220.6 LBS | SYSTOLIC BLOOD PRESSURE: 120 MMHG | DIASTOLIC BLOOD PRESSURE: 60 MMHG

## 2024-04-26 DIAGNOSIS — F33.1 MAJOR DEPRESSIVE DISORDER, RECURRENT, MODERATE (HCC): Primary | ICD-10-CM

## 2024-04-26 DIAGNOSIS — F40.01 AGORAPHOBIA WITH PANIC DISORDER: ICD-10-CM

## 2024-04-26 DIAGNOSIS — F51.04 PSYCHOPHYSIOLOGICAL INSOMNIA: ICD-10-CM

## 2024-04-26 PROCEDURE — 1036F TOBACCO NON-USER: CPT | Performed by: REGISTERED NURSE

## 2024-04-26 PROCEDURE — 99214 OFFICE O/P EST MOD 30 MIN: CPT | Performed by: REGISTERED NURSE

## 2024-04-26 PROCEDURE — G8417 CALC BMI ABV UP PARAM F/U: HCPCS | Performed by: REGISTERED NURSE

## 2024-04-26 PROCEDURE — 90833 PSYTX W PT W E/M 30 MIN: CPT | Performed by: REGISTERED NURSE

## 2024-04-26 PROCEDURE — G8427 DOCREV CUR MEDS BY ELIG CLIN: HCPCS | Performed by: REGISTERED NURSE

## 2024-04-26 RX ORDER — GABAPENTIN 100 MG/1
200 CAPSULE ORAL 3 TIMES DAILY
Qty: 180 CAPSULE | Refills: 2 | Status: SHIPPED | OUTPATIENT
Start: 2024-04-26 | End: 2024-10-23

## 2024-04-26 RX ORDER — SERTRALINE HYDROCHLORIDE 100 MG/1
100 TABLET, FILM COATED ORAL DAILY
Qty: 30 TABLET | Refills: 3 | Status: SHIPPED | OUTPATIENT
Start: 2024-04-26

## 2024-04-26 RX ORDER — QUETIAPINE FUMARATE 50 MG/1
75 TABLET, FILM COATED ORAL DAILY
Qty: 45 TABLET | Refills: 2 | Status: SHIPPED | OUTPATIENT
Start: 2024-04-26

## 2024-04-26 ASSESSMENT — ANXIETY QUESTIONNAIRES
1. FEELING NERVOUS, ANXIOUS, OR ON EDGE: SEVERAL DAYS
1. FEELING NERVOUS, ANXIOUS, OR ON EDGE: SEVERAL DAYS
7. FEELING AFRAID AS IF SOMETHING AWFUL MIGHT HAPPEN: NEARLY EVERY DAY
6. BECOMING EASILY ANNOYED OR IRRITABLE: MORE THAN HALF THE DAYS
4. TROUBLE RELAXING: SEVERAL DAYS
2. NOT BEING ABLE TO STOP OR CONTROL WORRYING: MORE THAN HALF THE DAYS
GAD7 TOTAL SCORE: 11
7. FEELING AFRAID AS IF SOMETHING AWFUL MIGHT HAPPEN: NEARLY EVERY DAY
2. NOT BEING ABLE TO STOP OR CONTROL WORRYING: MORE THAN HALF THE DAYS
3. WORRYING TOO MUCH ABOUT DIFFERENT THINGS: MORE THAN HALF THE DAYS
3. WORRYING TOO MUCH ABOUT DIFFERENT THINGS: MORE THAN HALF THE DAYS
5. BEING SO RESTLESS THAT IT IS HARD TO SIT STILL: NOT AT ALL
5. BEING SO RESTLESS THAT IT IS HARD TO SIT STILL: NOT AT ALL
IF YOU CHECKED OFF ANY PROBLEMS ON THIS QUESTIONNAIRE, HOW DIFFICULT HAVE THESE PROBLEMS MADE IT FOR YOU TO DO YOUR WORK, TAKE CARE OF THINGS AT HOME, OR GET ALONG WITH OTHER PEOPLE: EXTREMELY DIFFICULT
6. BECOMING EASILY ANNOYED OR IRRITABLE: MORE THAN HALF THE DAYS
IF YOU CHECKED OFF ANY PROBLEMS ON THIS QUESTIONNAIRE, HOW DIFFICULT HAVE THESE PROBLEMS MADE IT FOR YOU TO DO YOUR WORK, TAKE CARE OF THINGS AT HOME, OR GET ALONG WITH OTHER PEOPLE: EXTREMELY DIFFICULT
4. TROUBLE RELAXING: SEVERAL DAYS

## 2024-04-26 ASSESSMENT — PATIENT HEALTH QUESTIONNAIRE - PHQ9
SUM OF ALL RESPONSES TO PHQ QUESTIONS 1-9: 14
2. FEELING DOWN, DEPRESSED OR HOPELESS: MORE THAN HALF THE DAYS
SUM OF ALL RESPONSES TO PHQ QUESTIONS 1-9: 14
SUM OF ALL RESPONSES TO PHQ QUESTIONS 1-9: 14
10. IF YOU CHECKED OFF ANY PROBLEMS, HOW DIFFICULT HAVE THESE PROBLEMS MADE IT FOR YOU TO DO YOUR WORK, TAKE CARE OF THINGS AT HOME, OR GET ALONG WITH OTHER PEOPLE: VERY DIFFICULT
SUM OF ALL RESPONSES TO PHQ QUESTIONS 1-9: 14
1. LITTLE INTEREST OR PLEASURE IN DOING THINGS: SEVERAL DAYS
6. FEELING BAD ABOUT YOURSELF - OR THAT YOU ARE A FAILURE OR HAVE LET YOURSELF OR YOUR FAMILY DOWN: NEARLY EVERY DAY
SUM OF ALL RESPONSES TO PHQ QUESTIONS 1-9: 14
8. MOVING OR SPEAKING SO SLOWLY THAT OTHER PEOPLE COULD HAVE NOTICED. OR THE OPPOSITE - BEING SO FIDGETY OR RESTLESS THAT YOU HAVE BEEN MOVING AROUND A LOT MORE THAN USUAL: NOT AT ALL
9. THOUGHTS THAT YOU WOULD BE BETTER OFF DEAD, OR OF HURTING YOURSELF: NOT AT ALL
3. TROUBLE FALLING OR STAYING ASLEEP: SEVERAL DAYS
7. TROUBLE CONCENTRATING ON THINGS, SUCH AS READING THE NEWSPAPER OR WATCHING TELEVISION: MORE THAN HALF THE DAYS
9. THOUGHTS THAT YOU WOULD BE BETTER OFF DEAD, OR OF HURTING YOURSELF: NOT AT ALL
5. POOR APPETITE OR OVEREATING: MORE THAN HALF THE DAYS
4. FEELING TIRED OR HAVING LITTLE ENERGY: NEARLY EVERY DAY
6. FEELING BAD ABOUT YOURSELF - OR THAT YOU ARE A FAILURE OR HAVE LET YOURSELF OR YOUR FAMILY DOWN: NEARLY EVERY DAY
1. LITTLE INTEREST OR PLEASURE IN DOING THINGS: SEVERAL DAYS
10. IF YOU CHECKED OFF ANY PROBLEMS, HOW DIFFICULT HAVE THESE PROBLEMS MADE IT FOR YOU TO DO YOUR WORK, TAKE CARE OF THINGS AT HOME, OR GET ALONG WITH OTHER PEOPLE: VERY DIFFICULT
8. MOVING OR SPEAKING SO SLOWLY THAT OTHER PEOPLE COULD HAVE NOTICED. OR THE OPPOSITE, BEING SO FIGETY OR RESTLESS THAT YOU HAVE BEEN MOVING AROUND A LOT MORE THAN USUAL: NOT AT ALL
7. TROUBLE CONCENTRATING ON THINGS, SUCH AS READING THE NEWSPAPER OR WATCHING TELEVISION: MORE THAN HALF THE DAYS
3. TROUBLE FALLING OR STAYING ASLEEP: SEVERAL DAYS
SUM OF ALL RESPONSES TO PHQ9 QUESTIONS 1 & 2: 3
2. FEELING DOWN, DEPRESSED OR HOPELESS: MORE THAN HALF THE DAYS
4. FEELING TIRED OR HAVING LITTLE ENERGY: NEARLY EVERY DAY
5. POOR APPETITE OR OVEREATING: MORE THAN HALF THE DAYS

## 2024-04-26 NOTE — PROGRESS NOTES
PSYCHIATRIC MEDICATION MANAGEMENT PROGRESS NOTE  Bob Rubén, PMHNP    4/12/24  9:43 AM EDT   Patient was seen and examined in person, Chart reviewed   Patient's case discussed with other treatment providers       Time spent with Patient: 30 minutes    Chief Complaint: 43 y.o. female seen for follow-up visit for medication management of The primary encounter diagnosis was Major depressive disorder, recurrent, moderate (HCC). Diagnoses of Agoraphobia with panic disorder and Psychophysiological insomnia were also pertinent to this visit.. Visit attended by patient     Subjective:      Reports mild mood improvement, states things are strting not to look so drab.  States she recently took her son to an appointment for the first time in several years.      Feel mild improvement in positive thinking, chalelenging her fears of leaving home and driving, though continue to increase anxiety significantly.      States mild sleep improvement; thought reports takes several hours to fall asleep. Getting appx 8-9 hours now    Continues to appear anxious, racing thoughts, catastrophic thinking, somatic.    Impaired Adls, ability to function, decrease executive functions concentration and focus.     States took clonidine, however had a episode where she had increased fatigue. Denies previous experiences of fatigue when taking it.     Patient denies medication side effects.      At today's visit, patient denies thoughts of harm to self or others since last appointment, and denies auditory or visual hallucinations.       Appetite (since last visit):   [x] Normal/Unchanged  [] Increased  [] Decreased      Sleep (since last visit):   [] Normal/Unchanged  [] Increased  [x] Decreased      Energy:    [x] Normal/Unchanged  [] Increased  [] Decreased        SI [] Present  [x] Absent    HI  []Present  [x] Absent     Aggression:  [] yes  [x] no    Patient is [x] Able to contract for safety  [] unable to CONTRACT FOR SAFETY     ROS:  [x] All

## 2024-05-01 ENCOUNTER — TELEPHONE (OUTPATIENT)
Dept: FAMILY MEDICINE CLINIC | Age: 44
End: 2024-05-01

## 2024-05-01 NOTE — TELEPHONE ENCOUNTER
Amy calling into the office to let Kristen know that the patient's employer needs the [facility] paperwork faxed to them know.

## 2024-05-09 ASSESSMENT — PATIENT HEALTH QUESTIONNAIRE - PHQ9
SUM OF ALL RESPONSES TO PHQ QUESTIONS 1-9: 14
7. TROUBLE CONCENTRATING ON THINGS, SUCH AS READING THE NEWSPAPER OR WATCHING TELEVISION: MORE THAN HALF THE DAYS
8. MOVING OR SPEAKING SO SLOWLY THAT OTHER PEOPLE COULD HAVE NOTICED. OR THE OPPOSITE, BEING SO FIGETY OR RESTLESS THAT YOU HAVE BEEN MOVING AROUND A LOT MORE THAN USUAL: NOT AT ALL
6. FEELING BAD ABOUT YOURSELF - OR THAT YOU ARE A FAILURE OR HAVE LET YOURSELF OR YOUR FAMILY DOWN: NEARLY EVERY DAY
SUM OF ALL RESPONSES TO PHQ9 QUESTIONS 1 & 2: 5
SUM OF ALL RESPONSES TO PHQ QUESTIONS 1-9: 14
5. POOR APPETITE OR OVEREATING: SEVERAL DAYS
10. IF YOU CHECKED OFF ANY PROBLEMS, HOW DIFFICULT HAVE THESE PROBLEMS MADE IT FOR YOU TO DO YOUR WORK, TAKE CARE OF THINGS AT HOME, OR GET ALONG WITH OTHER PEOPLE: VERY DIFFICULT
4. FEELING TIRED OR HAVING LITTLE ENERGY: MORE THAN HALF THE DAYS
9. THOUGHTS THAT YOU WOULD BE BETTER OFF DEAD, OR OF HURTING YOURSELF: NOT AT ALL
2. FEELING DOWN, DEPRESSED OR HOPELESS: NEARLY EVERY DAY
1. LITTLE INTEREST OR PLEASURE IN DOING THINGS: MORE THAN HALF THE DAYS
3. TROUBLE FALLING OR STAYING ASLEEP: SEVERAL DAYS

## 2024-05-09 ASSESSMENT — ANXIETY QUESTIONNAIRES
3. WORRYING TOO MUCH ABOUT DIFFERENT THINGS: NEARLY EVERY DAY
IF YOU CHECKED OFF ANY PROBLEMS ON THIS QUESTIONNAIRE, HOW DIFFICULT HAVE THESE PROBLEMS MADE IT FOR YOU TO DO YOUR WORK, TAKE CARE OF THINGS AT HOME, OR GET ALONG WITH OTHER PEOPLE: EXTREMELY DIFFICULT
7. FEELING AFRAID AS IF SOMETHING AWFUL MIGHT HAPPEN: MORE THAN HALF THE DAYS
1. FEELING NERVOUS, ANXIOUS, OR ON EDGE: MORE THAN HALF THE DAYS
6. BECOMING EASILY ANNOYED OR IRRITABLE: MORE THAN HALF THE DAYS
4. TROUBLE RELAXING: SEVERAL DAYS
2. NOT BEING ABLE TO STOP OR CONTROL WORRYING: NEARLY EVERY DAY
5. BEING SO RESTLESS THAT IT IS HARD TO SIT STILL: SEVERAL DAYS
GAD7 TOTAL SCORE: 14

## 2024-05-16 ASSESSMENT — ANXIETY QUESTIONNAIRES
2. NOT BEING ABLE TO STOP OR CONTROL WORRYING: NEARLY EVERY DAY
IF YOU CHECKED OFF ANY PROBLEMS ON THIS QUESTIONNAIRE, HOW DIFFICULT HAVE THESE PROBLEMS MADE IT FOR YOU TO DO YOUR WORK, TAKE CARE OF THINGS AT HOME, OR GET ALONG WITH OTHER PEOPLE: EXTREMELY DIFFICULT
4. TROUBLE RELAXING: SEVERAL DAYS
1. FEELING NERVOUS, ANXIOUS, OR ON EDGE: MORE THAN HALF THE DAYS
7. FEELING AFRAID AS IF SOMETHING AWFUL MIGHT HAPPEN: MORE THAN HALF THE DAYS
2. NOT BEING ABLE TO STOP OR CONTROL WORRYING: NEARLY EVERY DAY
6. BECOMING EASILY ANNOYED OR IRRITABLE: MORE THAN HALF THE DAYS
1. FEELING NERVOUS, ANXIOUS, OR ON EDGE: MORE THAN HALF THE DAYS
5. BEING SO RESTLESS THAT IT IS HARD TO SIT STILL: SEVERAL DAYS
4. TROUBLE RELAXING: SEVERAL DAYS
3. WORRYING TOO MUCH ABOUT DIFFERENT THINGS: NEARLY EVERY DAY
GAD7 TOTAL SCORE: 14
7. FEELING AFRAID AS IF SOMETHING AWFUL MIGHT HAPPEN: MORE THAN HALF THE DAYS
3. WORRYING TOO MUCH ABOUT DIFFERENT THINGS: NEARLY EVERY DAY
IF YOU CHECKED OFF ANY PROBLEMS ON THIS QUESTIONNAIRE, HOW DIFFICULT HAVE THESE PROBLEMS MADE IT FOR YOU TO DO YOUR WORK, TAKE CARE OF THINGS AT HOME, OR GET ALONG WITH OTHER PEOPLE: EXTREMELY DIFFICULT
5. BEING SO RESTLESS THAT IT IS HARD TO SIT STILL: SEVERAL DAYS
6. BECOMING EASILY ANNOYED OR IRRITABLE: MORE THAN HALF THE DAYS

## 2024-05-16 ASSESSMENT — PATIENT HEALTH QUESTIONNAIRE - PHQ9
SUM OF ALL RESPONSES TO PHQ9 QUESTIONS 1 & 2: 5
SUM OF ALL RESPONSES TO PHQ QUESTIONS 1-9: 14
3. TROUBLE FALLING OR STAYING ASLEEP: SEVERAL DAYS
10. IF YOU CHECKED OFF ANY PROBLEMS, HOW DIFFICULT HAVE THESE PROBLEMS MADE IT FOR YOU TO DO YOUR WORK, TAKE CARE OF THINGS AT HOME, OR GET ALONG WITH OTHER PEOPLE: VERY DIFFICULT
6. FEELING BAD ABOUT YOURSELF - OR THAT YOU ARE A FAILURE OR HAVE LET YOURSELF OR YOUR FAMILY DOWN: NEARLY EVERY DAY
1. LITTLE INTEREST OR PLEASURE IN DOING THINGS: MORE THAN HALF THE DAYS
SUM OF ALL RESPONSES TO PHQ QUESTIONS 1-9: 14
7. TROUBLE CONCENTRATING ON THINGS, SUCH AS READING THE NEWSPAPER OR WATCHING TELEVISION: MORE THAN HALF THE DAYS
9. THOUGHTS THAT YOU WOULD BE BETTER OFF DEAD, OR OF HURTING YOURSELF: NOT AT ALL
SUM OF ALL RESPONSES TO PHQ QUESTIONS 1-9: 14
2. FEELING DOWN, DEPRESSED OR HOPELESS: NEARLY EVERY DAY
8. MOVING OR SPEAKING SO SLOWLY THAT OTHER PEOPLE COULD HAVE NOTICED. OR THE OPPOSITE, BEING SO FIGETY OR RESTLESS THAT YOU HAVE BEEN MOVING AROUND A LOT MORE THAN USUAL: NOT AT ALL
SUM OF ALL RESPONSES TO PHQ QUESTIONS 1-9: 14
5. POOR APPETITE OR OVEREATING: SEVERAL DAYS
1. LITTLE INTEREST OR PLEASURE IN DOING THINGS: MORE THAN HALF THE DAYS
10. IF YOU CHECKED OFF ANY PROBLEMS, HOW DIFFICULT HAVE THESE PROBLEMS MADE IT FOR YOU TO DO YOUR WORK, TAKE CARE OF THINGS AT HOME, OR GET ALONG WITH OTHER PEOPLE: VERY DIFFICULT
SUM OF ALL RESPONSES TO PHQ QUESTIONS 1-9: 14
3. TROUBLE FALLING OR STAYING ASLEEP: SEVERAL DAYS
2. FEELING DOWN, DEPRESSED OR HOPELESS: NEARLY EVERY DAY
6. FEELING BAD ABOUT YOURSELF - OR THAT YOU ARE A FAILURE OR HAVE LET YOURSELF OR YOUR FAMILY DOWN: NEARLY EVERY DAY
8. MOVING OR SPEAKING SO SLOWLY THAT OTHER PEOPLE COULD HAVE NOTICED. OR THE OPPOSITE - BEING SO FIDGETY OR RESTLESS THAT YOU HAVE BEEN MOVING AROUND A LOT MORE THAN USUAL: NOT AT ALL
7. TROUBLE CONCENTRATING ON THINGS, SUCH AS READING THE NEWSPAPER OR WATCHING TELEVISION: MORE THAN HALF THE DAYS
5. POOR APPETITE OR OVEREATING: SEVERAL DAYS
9. THOUGHTS THAT YOU WOULD BE BETTER OFF DEAD, OR OF HURTING YOURSELF: NOT AT ALL
4. FEELING TIRED OR HAVING LITTLE ENERGY: MORE THAN HALF THE DAYS

## 2024-05-17 ENCOUNTER — OFFICE VISIT (OUTPATIENT)
Dept: BEHAVIORAL/MENTAL HEALTH CLINIC | Age: 44
End: 2024-05-17
Payer: COMMERCIAL

## 2024-05-17 VITALS
HEART RATE: 80 BPM | SYSTOLIC BLOOD PRESSURE: 130 MMHG | DIASTOLIC BLOOD PRESSURE: 80 MMHG | BODY MASS INDEX: 38.11 KG/M2 | WEIGHT: 222 LBS

## 2024-05-17 DIAGNOSIS — L73.9 FOLLICULITIS: ICD-10-CM

## 2024-05-17 DIAGNOSIS — F51.04 PSYCHOPHYSIOLOGICAL INSOMNIA: ICD-10-CM

## 2024-05-17 DIAGNOSIS — F40.01 AGORAPHOBIA WITH PANIC DISORDER: ICD-10-CM

## 2024-05-17 DIAGNOSIS — F33.1 MAJOR DEPRESSIVE DISORDER, RECURRENT, MODERATE (HCC): Primary | ICD-10-CM

## 2024-05-17 DIAGNOSIS — F41.1 GENERALIZED ANXIETY DISORDER: ICD-10-CM

## 2024-05-17 PROCEDURE — 1036F TOBACCO NON-USER: CPT | Performed by: REGISTERED NURSE

## 2024-05-17 PROCEDURE — 90833 PSYTX W PT W E/M 30 MIN: CPT | Performed by: REGISTERED NURSE

## 2024-05-17 PROCEDURE — 99214 OFFICE O/P EST MOD 30 MIN: CPT | Performed by: REGISTERED NURSE

## 2024-05-17 PROCEDURE — G8427 DOCREV CUR MEDS BY ELIG CLIN: HCPCS | Performed by: REGISTERED NURSE

## 2024-05-17 PROCEDURE — G8417 CALC BMI ABV UP PARAM F/U: HCPCS | Performed by: REGISTERED NURSE

## 2024-05-17 RX ORDER — CLONIDINE HYDROCHLORIDE 0.1 MG/1
0.1 TABLET ORAL 2 TIMES DAILY PRN
Qty: 60 TABLET | Refills: 5 | Status: SHIPPED | OUTPATIENT
Start: 2024-05-17

## 2024-05-17 RX ORDER — SERTRALINE HYDROCHLORIDE 100 MG/1
100 TABLET, FILM COATED ORAL DAILY
Qty: 30 TABLET | Refills: 5 | Status: SHIPPED | OUTPATIENT
Start: 2024-05-17

## 2024-05-17 RX ORDER — QUETIAPINE FUMARATE 50 MG/1
75 TABLET, FILM COATED ORAL DAILY
Qty: 45 TABLET | Refills: 5 | Status: SHIPPED | OUTPATIENT
Start: 2024-05-17

## 2024-05-17 RX ORDER — GABAPENTIN 100 MG/1
200 CAPSULE ORAL 3 TIMES DAILY
Qty: 180 CAPSULE | Refills: 5 | Status: SHIPPED | OUTPATIENT
Start: 2024-05-17 | End: 2024-11-13

## 2024-05-17 NOTE — PROGRESS NOTES
PSYCHIATRIC MEDICATION MANAGEMENT PROGRESS NOTE  Bob Montana, PMHNP    05/17/2024  1030   Patient was seen and examined in person, Chart reviewed   Patient's case discussed with other treatment providers       Time spent with Patient: 30 minutes    Chief Complaint: 43 y.o. female seen for follow-up visit for medication management of The primary encounter diagnosis was Major depressive disorder, recurrent, moderate (HCC). Diagnoses of Generalized anxiety disorder, Folliculitis, Agoraphobia with panic disorder, and Psychophysiological insomnia were also pertinent to this visit.. Visit attended by patient     Subjective:      States doing significantly worse, states she recently found out     Attempting to distract self from racing thoughts and ruminating.    sleep improvement; thought reports takes several hours to fall asleep. Getting appx 8-9 hours now    Continues to appear anxious, racing thoughts, catastrophic thinking, somatic.    Impaired Adls, ability to function, decrease executive functions concentration and focus.     States took clonidine, states side effects have subsided, takes the \"edge off'    Reports previously having to cancel appointment due to anxiety, states unsure if she has been terminated via work, believes she los her health insurance.  Has not been successful filling grievance.  States very overwhelemed, hard to manage everything     Patient denies medication side effects.      At today's visit, patient denies thoughts of harm to self or others since last appointment, and denies auditory or visual hallucinations.       Appetite (since last visit):   [x] Normal/Unchanged  [] Increased  [] Decreased      Sleep (since last visit):   [] Normal/Unchanged  [] Increased  [x] Decreased      Energy:    [x] Normal/Unchanged  [] Increased  [] Decreased        SI [] Present  [x] Absent    HI  []Present  [x] Absent     Aggression:  [] yes  [x] no    Patient is [x] Able to contract for safety  [] unable to

## 2024-06-14 ENCOUNTER — OFFICE VISIT (OUTPATIENT)
Dept: BEHAVIORAL/MENTAL HEALTH CLINIC | Age: 44
End: 2024-06-14

## 2024-06-14 VITALS
WEIGHT: 220 LBS | SYSTOLIC BLOOD PRESSURE: 114 MMHG | HEART RATE: 79 BPM | DIASTOLIC BLOOD PRESSURE: 71 MMHG | BODY MASS INDEX: 37.76 KG/M2

## 2024-06-14 DIAGNOSIS — F33.1 MAJOR DEPRESSIVE DISORDER, RECURRENT, MODERATE (HCC): ICD-10-CM

## 2024-06-14 DIAGNOSIS — F51.04 PSYCHOPHYSIOLOGICAL INSOMNIA: ICD-10-CM

## 2024-06-14 DIAGNOSIS — F40.01 AGORAPHOBIA WITH PANIC DISORDER: Primary | ICD-10-CM

## 2024-06-14 DIAGNOSIS — F41.1 GENERALIZED ANXIETY DISORDER: ICD-10-CM

## 2024-06-14 RX ORDER — QUETIAPINE FUMARATE 50 MG/1
TABLET, FILM COATED ORAL
Qty: 60 TABLET | Refills: 3 | Status: SHIPPED | OUTPATIENT
Start: 2024-06-14

## 2024-06-14 RX ORDER — CLONIDINE HYDROCHLORIDE 0.1 MG/1
0.1 TABLET ORAL 2 TIMES DAILY PRN
Qty: 60 TABLET | Refills: 5 | Status: SHIPPED | OUTPATIENT
Start: 2024-06-14

## 2024-06-14 RX ORDER — SERTRALINE HYDROCHLORIDE 100 MG/1
100 TABLET, FILM COATED ORAL DAILY
Qty: 30 TABLET | Refills: 5 | Status: SHIPPED | OUTPATIENT
Start: 2024-06-14

## 2024-06-14 ASSESSMENT — ANXIETY QUESTIONNAIRES
GAD7 TOTAL SCORE: 9
6. BECOMING EASILY ANNOYED OR IRRITABLE: MORE THAN HALF THE DAYS
1. FEELING NERVOUS, ANXIOUS, OR ON EDGE: SEVERAL DAYS
4. TROUBLE RELAXING: SEVERAL DAYS
2. NOT BEING ABLE TO STOP OR CONTROL WORRYING: SEVERAL DAYS
3. WORRYING TOO MUCH ABOUT DIFFERENT THINGS: SEVERAL DAYS
IF YOU CHECKED OFF ANY PROBLEMS ON THIS QUESTIONNAIRE, HOW DIFFICULT HAVE THESE PROBLEMS MADE IT FOR YOU TO DO YOUR WORK, TAKE CARE OF THINGS AT HOME, OR GET ALONG WITH OTHER PEOPLE: VERY DIFFICULT
7. FEELING AFRAID AS IF SOMETHING AWFUL MIGHT HAPPEN: MORE THAN HALF THE DAYS
2. NOT BEING ABLE TO STOP OR CONTROL WORRYING: SEVERAL DAYS
1. FEELING NERVOUS, ANXIOUS, OR ON EDGE: SEVERAL DAYS
5. BEING SO RESTLESS THAT IT IS HARD TO SIT STILL: SEVERAL DAYS
7. FEELING AFRAID AS IF SOMETHING AWFUL MIGHT HAPPEN: MORE THAN HALF THE DAYS
5. BEING SO RESTLESS THAT IT IS HARD TO SIT STILL: SEVERAL DAYS
4. TROUBLE RELAXING: SEVERAL DAYS
3. WORRYING TOO MUCH ABOUT DIFFERENT THINGS: SEVERAL DAYS
6. BECOMING EASILY ANNOYED OR IRRITABLE: MORE THAN HALF THE DAYS
IF YOU CHECKED OFF ANY PROBLEMS ON THIS QUESTIONNAIRE, HOW DIFFICULT HAVE THESE PROBLEMS MADE IT FOR YOU TO DO YOUR WORK, TAKE CARE OF THINGS AT HOME, OR GET ALONG WITH OTHER PEOPLE: VERY DIFFICULT

## 2024-06-14 ASSESSMENT — PATIENT HEALTH QUESTIONNAIRE - PHQ9
SUM OF ALL RESPONSES TO PHQ QUESTIONS 1-9: 11
10. IF YOU CHECKED OFF ANY PROBLEMS, HOW DIFFICULT HAVE THESE PROBLEMS MADE IT FOR YOU TO DO YOUR WORK, TAKE CARE OF THINGS AT HOME, OR GET ALONG WITH OTHER PEOPLE: SOMEWHAT DIFFICULT
SUM OF ALL RESPONSES TO PHQ9 QUESTIONS 1 & 2: 3
5. POOR APPETITE OR OVEREATING: SEVERAL DAYS
6. FEELING BAD ABOUT YOURSELF - OR THAT YOU ARE A FAILURE OR HAVE LET YOURSELF OR YOUR FAMILY DOWN: NEARLY EVERY DAY
5. POOR APPETITE OR OVEREATING: SEVERAL DAYS
7. TROUBLE CONCENTRATING ON THINGS, SUCH AS READING THE NEWSPAPER OR WATCHING TELEVISION: SEVERAL DAYS
4. FEELING TIRED OR HAVING LITTLE ENERGY: MORE THAN HALF THE DAYS
2. FEELING DOWN, DEPRESSED OR HOPELESS: MORE THAN HALF THE DAYS
4. FEELING TIRED OR HAVING LITTLE ENERGY: MORE THAN HALF THE DAYS
8. MOVING OR SPEAKING SO SLOWLY THAT OTHER PEOPLE COULD HAVE NOTICED. OR THE OPPOSITE, BEING SO FIGETY OR RESTLESS THAT YOU HAVE BEEN MOVING AROUND A LOT MORE THAN USUAL: NOT AT ALL
3. TROUBLE FALLING OR STAYING ASLEEP: SEVERAL DAYS
SUM OF ALL RESPONSES TO PHQ QUESTIONS 1-9: 11
6. FEELING BAD ABOUT YOURSELF - OR THAT YOU ARE A FAILURE OR HAVE LET YOURSELF OR YOUR FAMILY DOWN: NEARLY EVERY DAY
1. LITTLE INTEREST OR PLEASURE IN DOING THINGS: SEVERAL DAYS
9. THOUGHTS THAT YOU WOULD BE BETTER OFF DEAD, OR OF HURTING YOURSELF: NOT AT ALL
3. TROUBLE FALLING OR STAYING ASLEEP: SEVERAL DAYS
SUM OF ALL RESPONSES TO PHQ QUESTIONS 1-9: 11
9. THOUGHTS THAT YOU WOULD BE BETTER OFF DEAD, OR OF HURTING YOURSELF: NOT AT ALL
8. MOVING OR SPEAKING SO SLOWLY THAT OTHER PEOPLE COULD HAVE NOTICED. OR THE OPPOSITE - BEING SO FIDGETY OR RESTLESS THAT YOU HAVE BEEN MOVING AROUND A LOT MORE THAN USUAL: NOT AT ALL
10. IF YOU CHECKED OFF ANY PROBLEMS, HOW DIFFICULT HAVE THESE PROBLEMS MADE IT FOR YOU TO DO YOUR WORK, TAKE CARE OF THINGS AT HOME, OR GET ALONG WITH OTHER PEOPLE: SOMEWHAT DIFFICULT
1. LITTLE INTEREST OR PLEASURE IN DOING THINGS: SEVERAL DAYS
SUM OF ALL RESPONSES TO PHQ QUESTIONS 1-9: 11
2. FEELING DOWN, DEPRESSED OR HOPELESS: MORE THAN HALF THE DAYS
SUM OF ALL RESPONSES TO PHQ QUESTIONS 1-9: 11
7. TROUBLE CONCENTRATING ON THINGS, SUCH AS READING THE NEWSPAPER OR WATCHING TELEVISION: SEVERAL DAYS

## 2024-06-14 NOTE — PROGRESS NOTES
PSYCHIATRIC MEDICATION MANAGEMENT PROGRESS NOTE  Bob Montana, PMHNP    06/14/2024  6883     Patient was seen and examined in person, Chart reviewed   Patient's case discussed with other treatment providers       Time spent with Patient: 30 minutes    Chief Complaint: 43 y.o. female seen for follow-up visit for medication management of The primary encounter diagnosis was Agoraphobia with panic disorder. Diagnoses of Generalized anxiety disorder, Psychophysiological insomnia, and Major depressive disorder, recurrent, moderate (HCC) were also pertinent to this visit.. Visit attended by patient     Subjective:      States doing significantly worse, states she recently found out work has terminated her due to medical leave, states she did not turn in appropriate paperwork.     Attempting to distract self from racing thoughts and ruminating.    Sleep improvement; thought reports takes several hours to fall asleep. Getting appx 8-9 hours now, but broken     Continues to appear anxious, racing thoughts, catastrophic thinking, somatic.    Impaired Adls, ability to function, decrease executive functions concentration and focus.     States took clonidine, states side effects have subsided, takes the \"edge off'  Patient denies taking morning dose of Seroquel.     Patient denies medication side effects.      At today's visit, patient denies thoughts of harm to self or others since last appointment, and denies auditory or visual hallucinations.       Appetite (since last visit):   [x] Normal/Unchanged  [] Increased  [] Decreased      Sleep (since last visit):   [] Normal/Unchanged  [x] Increased  [] Decreased      Energy:    [x] Normal/Unchanged  [] Increased  [] Decreased        SI [] Present  [x] Absent    HI  []Present  [x] Absent     Aggression:  [] yes  [x] no    Patient is [x] Able to contract for safety  [] unable to CONTRACT FOR SAFETY     ROS:  [x] All negative/unchanged except if checked. Explain positive(checked items)

## 2024-06-24 ENCOUNTER — TELEPHONE (OUTPATIENT)
Dept: ENDOCRINOLOGY | Age: 44
End: 2024-06-24

## 2024-06-24 NOTE — TELEPHONE ENCOUNTER
Patient has an appt on 7/1/2024 and thinks she may be due for blood work but does not have any orders.  She does use [facility], please call her to advise.

## 2024-06-25 DIAGNOSIS — E89.0 POSTOPERATIVE HYPOTHYROIDISM: Primary | ICD-10-CM

## 2024-06-29 DIAGNOSIS — E89.0 POSTOPERATIVE HYPOTHYROIDISM: ICD-10-CM

## 2024-06-29 LAB
ANION GAP SERPL CALCULATED.3IONS-SCNC: 12 MEQ/L (ref 9–15)
BUN SERPL-MCNC: 12 MG/DL (ref 6–20)
CALCIUM SERPL-MCNC: 9 MG/DL (ref 8.5–9.9)
CHLORIDE SERPL-SCNC: 102 MEQ/L (ref 95–107)
CO2 SERPL-SCNC: 25 MEQ/L (ref 20–31)
CREAT SERPL-MCNC: 0.8 MG/DL (ref 0.5–0.9)
GLUCOSE SERPL-MCNC: 87 MG/DL (ref 70–99)
POTASSIUM SERPL-SCNC: 4.3 MEQ/L (ref 3.4–4.9)
SODIUM SERPL-SCNC: 139 MEQ/L (ref 135–144)
T4 FREE SERPL-MCNC: 0.87 NG/DL (ref 0.84–1.68)
TSH REFLEX: 16.03 UIU/ML (ref 0.44–3.86)

## 2024-07-01 ENCOUNTER — OFFICE VISIT (OUTPATIENT)
Dept: ENDOCRINOLOGY | Age: 44
End: 2024-07-01
Payer: COMMERCIAL

## 2024-07-01 VITALS
OXYGEN SATURATION: 98 % | DIASTOLIC BLOOD PRESSURE: 74 MMHG | BODY MASS INDEX: 38.07 KG/M2 | HEART RATE: 66 BPM | SYSTOLIC BLOOD PRESSURE: 109 MMHG | WEIGHT: 223 LBS | HEIGHT: 64 IN

## 2024-07-01 DIAGNOSIS — E88.819 INSULIN RESISTANCE: ICD-10-CM

## 2024-07-01 DIAGNOSIS — E16.2 HYPOGLYCEMIA: ICD-10-CM

## 2024-07-01 DIAGNOSIS — E66.9 OBESITY (BMI 30-39.9): ICD-10-CM

## 2024-07-01 DIAGNOSIS — E89.0 POSTOPERATIVE HYPOTHYROIDISM: Primary | ICD-10-CM

## 2024-07-01 PROCEDURE — 99214 OFFICE O/P EST MOD 30 MIN: CPT | Performed by: INTERNAL MEDICINE

## 2024-07-01 RX ORDER — LEVOTHYROXINE SODIUM 300 UG/1
300 TABLET ORAL DAILY
Qty: 90 TABLET | Refills: 3 | Status: SHIPPED | OUTPATIENT
Start: 2024-07-01

## 2024-07-01 RX ORDER — ONDANSETRON 4 MG/1
TABLET, ORALLY DISINTEGRATING ORAL
COMMUNITY
Start: 2024-06-18

## 2024-07-01 ASSESSMENT — ENCOUNTER SYMPTOMS: EYES NEGATIVE: 1

## 2024-07-01 NOTE — PROGRESS NOTES
7/1/2024    Assessment:       Diagnosis Orders   1. Postoperative hypothyroidism        2. Hypoglycemia        3. Obesity (BMI 30-39.9)        4. Insulin resistance              PLAN:     Orders Placed This Encounter   Procedures    T4, Free     Standing Status:   Future     Standing Expiration Date:   7/1/2025    TSH with Reflex     Standing Status:   Future     Standing Expiration Date:   7/1/2025    Basic Metabolic Panel     Standing Status:   Future     Standing Expiration Date:   7/1/2025    Hemoglobin A1C     Standing Status:   Future     Standing Expiration Date:   7/1/2025     Orders Placed This Encounter   Medications    levothyroxine (SYNTHROID) 300 MCG tablet     Sig: Take 1 tablet by mouth daily     Dispense:  90 tablet     Refill:  3    metFORMIN (GLUCOPHAGE) 500 MG tablet     Sig: Take 1 tablet by mouth 2 times daily (with meals)     Dispense:  180 tablet     Refill:  1     The patient is asked to make an attempt to improve diet and exercise patterns to aid in medical management of this problem.    Increase dose of Synthroid to 300 mcg daily start patient on metformin 500 mg twice daily.  About low-carb increase protein diabetes regular basis and more than 50% of 30 minutes spent in patient education counseling  Subjective:     Chief Complaint   Patient presents with    Hypothyroidism     Patient wants to discuss low blood sugars     Vitals:    07/01/24 0918   BP: 109/74   Pulse: 66   SpO2: 98%   Weight: 101.2 kg (223 lb)   Height: 1.626 m (5' 4.02\")     Wt Readings from Last 3 Encounters:   07/01/24 101.2 kg (223 lb)   06/14/24 99.8 kg (220 lb)   05/17/24 100.7 kg (222 lb)     BP Readings from Last 3 Encounters:   07/01/24 109/74   06/14/24 114/71   05/17/24 130/80     Follow-up on hypothyroidism on levothyroxine to 50 mcg daily TSH was slightly elevated   obesity patient has been gaining weight also being tested for sleep apnea history of arthritis    Patient sometimes eats only 1 meal a

## 2024-07-05 ENCOUNTER — TELEMEDICINE (OUTPATIENT)
Dept: BEHAVIORAL/MENTAL HEALTH CLINIC | Age: 44
End: 2024-07-05
Payer: COMMERCIAL

## 2024-07-05 DIAGNOSIS — F40.01 AGORAPHOBIA WITH PANIC DISORDER: ICD-10-CM

## 2024-07-05 DIAGNOSIS — R53.83 FATIGUE DUE TO DEPRESSION: ICD-10-CM

## 2024-07-05 DIAGNOSIS — F33.1 MAJOR DEPRESSIVE DISORDER, RECURRENT, MODERATE (HCC): Primary | ICD-10-CM

## 2024-07-05 DIAGNOSIS — F32.A FATIGUE DUE TO DEPRESSION: ICD-10-CM

## 2024-07-05 DIAGNOSIS — F41.9 ANXIETY: ICD-10-CM

## 2024-07-05 PROCEDURE — 99214 OFFICE O/P EST MOD 30 MIN: CPT | Performed by: REGISTERED NURSE

## 2024-07-05 NOTE — PROGRESS NOTES
PSYCHIATRIC MEDICATION MANAGEMENT PROGRESS NOTE  Bob Montana PMHNP    7/5/24  10:24 AM EDT    Follow UP TELEHEALTH PSYCHIATRIC EVALUATION (OUTPATIENT)  Audio/Visual (During COVID-19 public health emergency)        30 minutes total for encounter: including time spent in direct communication with patient and time spent on chart review, and in the ordering of all necessary labs and/or medications        Amy M Irizarry, was evaluated through a synchronous (real-time) audio-video encounter. The patient (or guardian if applicable) is aware that this is a billable service, which includes applicable co-pays. This Virtual Visit was conducted with patient's (and/or legal guardian's) consent. Patient identification was verified, and a caregiver was present when appropriate.   The patient was located at Home: [address] [place] [address]  Provider was located at Facility (Appt Dept): [address] [place] [address]  Confirm you are appropriately licensed, registered, or certified to deliver care in the [facility] where the patient is located as indicated above. If you are not or unsure, please re-schedule the visit: Yes, I confirm.      Total time spent for this encounter: Not billed by time    --Bob Montana, APRN - CNP on 7/5/2024 at 10:28 AM    An electronic signature was used to authenticate this note.     Chief Complaint: 43 y.o. female seen for follow-up visit for medication management of The primary encounter diagnosis was Major depressive disorder, recurrent, moderate (HCC). Diagnoses of Agoraphobia with panic disorder, Anxiety, and Fatigue due to depression were also pertinent to this visit.. Visit attended by patient     Subjective:      States things are going \"so so \"  states when taking medication in AM her blood sugar dropped one then did not take it again.  Drooped again two more after taking it.  She reports Dr Conklin stated unlikely medication impacted hypoglycemia.  Pt reports she is also working to

## 2024-07-17 ENCOUNTER — PATIENT MESSAGE (OUTPATIENT)
Dept: BEHAVIORAL/MENTAL HEALTH CLINIC | Age: 44
End: 2024-07-17

## 2024-07-17 NOTE — TELEPHONE ENCOUNTER
From: Amy Irizarry  To: Bob Montana  Sent: 7/15/2024 3:57 PM EDT  Subject: Appointment Request    Appointment Request From: Amy Irizarry    With Provider: OLIVIA Blanco CNP [[facility]]    Preferred Date Range: 7/26/2024 – 8/9/2024    Preferred Times: Any Time    Reason for visit: Request an Appointment    Comments:  Follow up

## 2024-07-30 ENCOUNTER — OFFICE VISIT (OUTPATIENT)
Dept: BEHAVIORAL/MENTAL HEALTH CLINIC | Age: 44
End: 2024-07-30
Payer: COMMERCIAL

## 2024-07-30 VITALS
WEIGHT: 222 LBS | SYSTOLIC BLOOD PRESSURE: 103 MMHG | BODY MASS INDEX: 38.09 KG/M2 | HEART RATE: 66 BPM | DIASTOLIC BLOOD PRESSURE: 72 MMHG

## 2024-07-30 DIAGNOSIS — F33.1 MAJOR DEPRESSIVE DISORDER, RECURRENT, MODERATE (HCC): ICD-10-CM

## 2024-07-30 DIAGNOSIS — F41.9 ANXIETY: Primary | ICD-10-CM

## 2024-07-30 DIAGNOSIS — F51.04 PSYCHOPHYSIOLOGICAL INSOMNIA: ICD-10-CM

## 2024-07-30 PROCEDURE — G8417 CALC BMI ABV UP PARAM F/U: HCPCS | Performed by: REGISTERED NURSE

## 2024-07-30 PROCEDURE — 1036F TOBACCO NON-USER: CPT | Performed by: REGISTERED NURSE

## 2024-07-30 PROCEDURE — G8427 DOCREV CUR MEDS BY ELIG CLIN: HCPCS | Performed by: REGISTERED NURSE

## 2024-07-30 PROCEDURE — 99214 OFFICE O/P EST MOD 30 MIN: CPT | Performed by: REGISTERED NURSE

## 2024-07-30 RX ORDER — CLONIDINE HYDROCHLORIDE 0.1 MG/1
0.1 TABLET ORAL 2 TIMES DAILY PRN
Qty: 60 TABLET | Refills: 5 | Status: SHIPPED | OUTPATIENT
Start: 2024-07-30

## 2024-07-30 RX ORDER — CLONAZEPAM 0.5 MG/1
0.5 TABLET ORAL 2 TIMES DAILY
Qty: 60 TABLET | Refills: 3 | Status: SHIPPED | OUTPATIENT
Start: 2024-07-30 | End: 2024-11-27

## 2024-07-30 RX ORDER — QUETIAPINE FUMARATE 50 MG/1
TABLET, FILM COATED ORAL
Qty: 60 TABLET | Refills: 3 | Status: SHIPPED | OUTPATIENT
Start: 2024-07-30

## 2024-07-30 RX ORDER — SERTRALINE HYDROCHLORIDE 100 MG/1
100 TABLET, FILM COATED ORAL DAILY
Qty: 30 TABLET | Refills: 5 | Status: SHIPPED | OUTPATIENT
Start: 2024-07-30

## 2024-07-30 ASSESSMENT — ANXIETY QUESTIONNAIRES
IF YOU CHECKED OFF ANY PROBLEMS ON THIS QUESTIONNAIRE, HOW DIFFICULT HAVE THESE PROBLEMS MADE IT FOR YOU TO DO YOUR WORK, TAKE CARE OF THINGS AT HOME, OR GET ALONG WITH OTHER PEOPLE: EXTREMELY DIFFICULT
7. FEELING AFRAID AS IF SOMETHING AWFUL MIGHT HAPPEN: SEVERAL DAYS
6. BECOMING EASILY ANNOYED OR IRRITABLE: MORE THAN HALF THE DAYS
2. NOT BEING ABLE TO STOP OR CONTROL WORRYING: MORE THAN HALF THE DAYS
3. WORRYING TOO MUCH ABOUT DIFFERENT THINGS: MORE THAN HALF THE DAYS
1. FEELING NERVOUS, ANXIOUS, OR ON EDGE: MORE THAN HALF THE DAYS
1. FEELING NERVOUS, ANXIOUS, OR ON EDGE: MORE THAN HALF THE DAYS
6. BECOMING EASILY ANNOYED OR IRRITABLE: MORE THAN HALF THE DAYS
5. BEING SO RESTLESS THAT IT IS HARD TO SIT STILL: SEVERAL DAYS
GAD7 TOTAL SCORE: 12
IF YOU CHECKED OFF ANY PROBLEMS ON THIS QUESTIONNAIRE, HOW DIFFICULT HAVE THESE PROBLEMS MADE IT FOR YOU TO DO YOUR WORK, TAKE CARE OF THINGS AT HOME, OR GET ALONG WITH OTHER PEOPLE: EXTREMELY DIFFICULT
2. NOT BEING ABLE TO STOP OR CONTROL WORRYING: MORE THAN HALF THE DAYS
4. TROUBLE RELAXING: MORE THAN HALF THE DAYS
3. WORRYING TOO MUCH ABOUT DIFFERENT THINGS: MORE THAN HALF THE DAYS
7. FEELING AFRAID AS IF SOMETHING AWFUL MIGHT HAPPEN: SEVERAL DAYS
4. TROUBLE RELAXING: MORE THAN HALF THE DAYS
5. BEING SO RESTLESS THAT IT IS HARD TO SIT STILL: SEVERAL DAYS

## 2024-07-30 ASSESSMENT — PATIENT HEALTH QUESTIONNAIRE - PHQ9
3. TROUBLE FALLING OR STAYING ASLEEP: SEVERAL DAYS
3. TROUBLE FALLING OR STAYING ASLEEP: SEVERAL DAYS
1. LITTLE INTEREST OR PLEASURE IN DOING THINGS: MORE THAN HALF THE DAYS
4. FEELING TIRED OR HAVING LITTLE ENERGY: NEARLY EVERY DAY
7. TROUBLE CONCENTRATING ON THINGS, SUCH AS READING THE NEWSPAPER OR WATCHING TELEVISION: NEARLY EVERY DAY
9. THOUGHTS THAT YOU WOULD BE BETTER OFF DEAD, OR OF HURTING YOURSELF: NOT AT ALL
6. FEELING BAD ABOUT YOURSELF - OR THAT YOU ARE A FAILURE OR HAVE LET YOURSELF OR YOUR FAMILY DOWN: NEARLY EVERY DAY
10. IF YOU CHECKED OFF ANY PROBLEMS, HOW DIFFICULT HAVE THESE PROBLEMS MADE IT FOR YOU TO DO YOUR WORK, TAKE CARE OF THINGS AT HOME, OR GET ALONG WITH OTHER PEOPLE: EXTREMELY DIFFICULT
SUM OF ALL RESPONSES TO PHQ QUESTIONS 1-9: 18
2. FEELING DOWN, DEPRESSED OR HOPELESS: NEARLY EVERY DAY
8. MOVING OR SPEAKING SO SLOWLY THAT OTHER PEOPLE COULD HAVE NOTICED. OR THE OPPOSITE, BEING SO FIGETY OR RESTLESS THAT YOU HAVE BEEN MOVING AROUND A LOT MORE THAN USUAL: SEVERAL DAYS
5. POOR APPETITE OR OVEREATING: MORE THAN HALF THE DAYS
SUM OF ALL RESPONSES TO PHQ QUESTIONS 1-9: 18
2. FEELING DOWN, DEPRESSED OR HOPELESS: NEARLY EVERY DAY
6. FEELING BAD ABOUT YOURSELF - OR THAT YOU ARE A FAILURE OR HAVE LET YOURSELF OR YOUR FAMILY DOWN: NEARLY EVERY DAY
SUM OF ALL RESPONSES TO PHQ QUESTIONS 1-9: 18
SUM OF ALL RESPONSES TO PHQ QUESTIONS 1-9: 18
9. THOUGHTS THAT YOU WOULD BE BETTER OFF DEAD, OR OF HURTING YOURSELF: NOT AT ALL
8. MOVING OR SPEAKING SO SLOWLY THAT OTHER PEOPLE COULD HAVE NOTICED. OR THE OPPOSITE - BEING SO FIDGETY OR RESTLESS THAT YOU HAVE BEEN MOVING AROUND A LOT MORE THAN USUAL: SEVERAL DAYS
10. IF YOU CHECKED OFF ANY PROBLEMS, HOW DIFFICULT HAVE THESE PROBLEMS MADE IT FOR YOU TO DO YOUR WORK, TAKE CARE OF THINGS AT HOME, OR GET ALONG WITH OTHER PEOPLE: EXTREMELY DIFFICULT
SUM OF ALL RESPONSES TO PHQ9 QUESTIONS 1 & 2: 5
1. LITTLE INTEREST OR PLEASURE IN DOING THINGS: MORE THAN HALF THE DAYS
5. POOR APPETITE OR OVEREATING: MORE THAN HALF THE DAYS
SUM OF ALL RESPONSES TO PHQ QUESTIONS 1-9: 18
4. FEELING TIRED OR HAVING LITTLE ENERGY: NEARLY EVERY DAY
7. TROUBLE CONCENTRATING ON THINGS, SUCH AS READING THE NEWSPAPER OR WATCHING TELEVISION: NEARLY EVERY DAY

## 2024-07-30 NOTE — PROGRESS NOTES
- CNP  Electronically signed by OLIVIA Blanco - CNP on 7/30/2024 at 10:35 AM  7/30/2024 10:35 AM    Psychiatry

## 2024-08-08 ENCOUNTER — PATIENT MESSAGE (OUTPATIENT)
Dept: BEHAVIORAL/MENTAL HEALTH CLINIC | Age: 44
End: 2024-08-08

## 2024-08-08 NOTE — TELEPHONE ENCOUNTER
From: Amy Irizarry  To: Bob Montana  Sent: 8/8/2024 10:16 AM EDT  Subject: Medication    I started the klonopin last week like as you prescribed and I'm not sure if it made my vertigo start back up or what because I have been feeling weird every since I started taking it. Just kind of off and my head feels full. Also my nausea is back really bad I had to take my last couple zofran to kind of calm it down but it hasn't taken the feeling completely away. I'm trying to keep my anxiety under control because the feeling in my head is giving me anxiety.

## 2024-08-16 ENCOUNTER — TELEMEDICINE (OUTPATIENT)
Dept: BEHAVIORAL/MENTAL HEALTH CLINIC | Age: 44
End: 2024-08-16
Payer: COMMERCIAL

## 2024-08-16 ENCOUNTER — PATIENT MESSAGE (OUTPATIENT)
Dept: FAMILY MEDICINE CLINIC | Age: 44
End: 2024-08-16

## 2024-08-16 DIAGNOSIS — F33.1 MAJOR DEPRESSIVE DISORDER, RECURRENT, MODERATE (HCC): ICD-10-CM

## 2024-08-16 DIAGNOSIS — F51.04 PSYCHOPHYSIOLOGICAL INSOMNIA: Primary | ICD-10-CM

## 2024-08-16 DIAGNOSIS — F41.1 GENERALIZED ANXIETY DISORDER: ICD-10-CM

## 2024-08-16 PROCEDURE — 1036F TOBACCO NON-USER: CPT | Performed by: REGISTERED NURSE

## 2024-08-16 PROCEDURE — G8417 CALC BMI ABV UP PARAM F/U: HCPCS | Performed by: REGISTERED NURSE

## 2024-08-16 PROCEDURE — 99214 OFFICE O/P EST MOD 30 MIN: CPT | Performed by: REGISTERED NURSE

## 2024-08-16 PROCEDURE — G8428 CUR MEDS NOT DOCUMENT: HCPCS | Performed by: REGISTERED NURSE

## 2024-08-16 RX ORDER — SERTRALINE HYDROCHLORIDE 100 MG/1
100 TABLET, FILM COATED ORAL DAILY
Qty: 30 TABLET | Refills: 5 | Status: SHIPPED | OUTPATIENT
Start: 2024-08-16

## 2024-08-16 RX ORDER — CLONIDINE HYDROCHLORIDE 0.1 MG/1
0.1 TABLET ORAL 2 TIMES DAILY PRN
Qty: 60 TABLET | Refills: 5 | Status: SHIPPED | OUTPATIENT
Start: 2024-08-16

## 2024-08-16 RX ORDER — QUETIAPINE FUMARATE 50 MG/1
TABLET, FILM COATED ORAL
Qty: 75 TABLET | Refills: 5 | Status: SHIPPED | OUTPATIENT
Start: 2024-08-16

## 2024-08-16 RX ORDER — GABAPENTIN 100 MG/1
200 CAPSULE ORAL 3 TIMES DAILY
Qty: 180 CAPSULE | Refills: 5 | Status: SHIPPED | OUTPATIENT
Start: 2024-08-16 | End: 2025-02-12

## 2024-08-16 NOTE — PROGRESS NOTES
PSYCHIATRIC MEDICATION MANAGEMENT PROGRESS NOTE  Bob Montana PMHNP    8/16/24  9:43 AM EDT    Follow UP TELEHEALTH PSYCHIATRIC EVALUATION (OUTPATIENT)  Audio/Visual (During COVID-19 public health emergency)        30 minutes total for encounter: including time spent in direct communication with patient and time spent on chart review, and in the ordering of all necessary labs and/or medications        Amy CARLIN Irizarry, was evaluated through a synchronous (real-time) audio-video encounter. The patient (or guardian if applicable) is aware that this is a billable service, which includes applicable co-pays. This Virtual Visit was conducted with patient's (and/or legal guardian's) consent. Patient identification was verified, and a caregiver was present when appropriate.   The patient was located at Home: [address] [place] [address]  Provider was located at Facility (Appt Dept): [address] [place] [address]  Confirm you are appropriately licensed, registered, or certified to deliver care in the [facility] where the patient is located as indicated above. If you are not or unsure, please re-schedule the visit: Yes, I confirm.      Total time spent for this encounter: Not billed by time    --Bob Montana, APRN - CNP on 8/16/2024 at 10:25 AM    An electronic signature was used to authenticate this note.     Chief Complaint: 44 y.o. female seen for follow-up visit for medication management of The primary encounter diagnosis was Psychophysiological insomnia. Diagnoses of Major depressive disorder, recurrent, moderate (HCC) and Generalized anxiety disorder were also pertinent to this visit.. Visit attended by patient     Subjective:      Continues to reports things are going \"so- so\".  States anxiety is not terrible ever day however increases  when having to leave the house to get kids back and forth to sports and family member admitted to the hospital.     States tried klonopin but made her feel like vertigo came

## 2024-08-27 RX ORDER — ERGOCALCIFEROL 1.25 MG/1
CAPSULE, LIQUID FILLED ORAL
Qty: 4 CAPSULE | Refills: 3 | Status: SHIPPED | OUTPATIENT
Start: 2024-08-27

## 2024-09-13 DIAGNOSIS — E88.819 INSULIN RESISTANCE: ICD-10-CM

## 2024-09-13 DIAGNOSIS — E55.9 VITAMIN D DEFICIENCY: Primary | ICD-10-CM

## 2024-09-13 DIAGNOSIS — E55.9 VITAMIN D DEFICIENCY: ICD-10-CM

## 2024-09-13 DIAGNOSIS — E66.9 OBESITY (BMI 30-39.9): ICD-10-CM

## 2024-09-13 DIAGNOSIS — E89.0 POSTOPERATIVE HYPOTHYROIDISM: ICD-10-CM

## 2024-09-13 DIAGNOSIS — E16.2 HYPOGLYCEMIA: ICD-10-CM

## 2024-09-13 LAB
ANION GAP SERPL CALCULATED.3IONS-SCNC: 9 MEQ/L (ref 9–15)
BUN SERPL-MCNC: 10 MG/DL (ref 6–20)
CALCIUM SERPL-MCNC: 9.2 MG/DL (ref 8.5–9.9)
CHLORIDE SERPL-SCNC: 105 MEQ/L (ref 95–107)
CO2 SERPL-SCNC: 26 MEQ/L (ref 20–31)
CREAT SERPL-MCNC: 0.67 MG/DL (ref 0.5–0.9)
ESTIMATED AVERAGE GLUCOSE: 131 MG/DL
GLUCOSE SERPL-MCNC: 103 MG/DL (ref 70–99)
HBA1C MFR BLD: 6.2 % (ref 4–6)
POTASSIUM SERPL-SCNC: 4.3 MEQ/L (ref 3.4–4.9)
SODIUM SERPL-SCNC: 140 MEQ/L (ref 135–144)
T4 FREE SERPL-MCNC: 1.73 NG/DL (ref 0.84–1.68)
TSH REFLEX: 0.03 UIU/ML (ref 0.44–3.86)
VITAMIN D 25-HYDROXY: 25.9 NG/ML (ref 30–100)

## 2024-09-16 ENCOUNTER — OFFICE VISIT (OUTPATIENT)
Dept: ENDOCRINOLOGY | Age: 44
End: 2024-09-16
Payer: COMMERCIAL

## 2024-09-16 VITALS
HEIGHT: 64 IN | OXYGEN SATURATION: 98 % | BODY MASS INDEX: 37.9 KG/M2 | DIASTOLIC BLOOD PRESSURE: 68 MMHG | SYSTOLIC BLOOD PRESSURE: 98 MMHG | WEIGHT: 222 LBS | HEART RATE: 60 BPM

## 2024-09-16 DIAGNOSIS — E16.2 HYPOGLYCEMIA: Primary | ICD-10-CM

## 2024-09-16 DIAGNOSIS — E89.0 POSTOPERATIVE HYPOTHYROIDISM: ICD-10-CM

## 2024-09-16 DIAGNOSIS — E88.819 INSULIN RESISTANCE: ICD-10-CM

## 2024-09-16 PROCEDURE — 99213 OFFICE O/P EST LOW 20 MIN: CPT | Performed by: INTERNAL MEDICINE

## 2024-09-16 PROCEDURE — G8427 DOCREV CUR MEDS BY ELIG CLIN: HCPCS | Performed by: INTERNAL MEDICINE

## 2024-09-16 PROCEDURE — 1036F TOBACCO NON-USER: CPT | Performed by: INTERNAL MEDICINE

## 2024-09-16 PROCEDURE — G8417 CALC BMI ABV UP PARAM F/U: HCPCS | Performed by: INTERNAL MEDICINE

## 2024-09-16 RX ORDER — LEVOTHYROXINE SODIUM 200 UG/1
200 TABLET ORAL DAILY
Qty: 90 TABLET | Refills: 3 | Status: SHIPPED | OUTPATIENT
Start: 2024-09-16

## 2024-09-16 RX ORDER — LEVOTHYROXINE SODIUM 75 UG/1
75 TABLET ORAL DAILY
Qty: 90 TABLET | Refills: 1 | Status: SHIPPED | OUTPATIENT
Start: 2024-09-16

## 2024-10-07 ENCOUNTER — OFFICE VISIT (OUTPATIENT)
Dept: FAMILY MEDICINE CLINIC | Age: 44
End: 2024-10-07
Payer: COMMERCIAL

## 2024-10-07 VITALS
BODY MASS INDEX: 38.11 KG/M2 | OXYGEN SATURATION: 97 % | SYSTOLIC BLOOD PRESSURE: 116 MMHG | DIASTOLIC BLOOD PRESSURE: 76 MMHG | HEIGHT: 64 IN | RESPIRATION RATE: 16 BRPM | HEART RATE: 71 BPM

## 2024-10-07 DIAGNOSIS — F40.01 AGORAPHOBIA WITH PANIC DISORDER: Primary | ICD-10-CM

## 2024-10-07 DIAGNOSIS — G37.3 TRANSVERSE MYELITIS (HCC): ICD-10-CM

## 2024-10-07 DIAGNOSIS — F33.1 MAJOR DEPRESSIVE DISORDER, RECURRENT, MODERATE (HCC): ICD-10-CM

## 2024-10-07 DIAGNOSIS — F41.1 GENERALIZED ANXIETY DISORDER: ICD-10-CM

## 2024-10-07 PROCEDURE — G8417 CALC BMI ABV UP PARAM F/U: HCPCS | Performed by: PHYSICIAN ASSISTANT

## 2024-10-07 PROCEDURE — G8484 FLU IMMUNIZE NO ADMIN: HCPCS | Performed by: PHYSICIAN ASSISTANT

## 2024-10-07 PROCEDURE — 1036F TOBACCO NON-USER: CPT | Performed by: PHYSICIAN ASSISTANT

## 2024-10-07 PROCEDURE — 99214 OFFICE O/P EST MOD 30 MIN: CPT | Performed by: PHYSICIAN ASSISTANT

## 2024-10-07 PROCEDURE — G8427 DOCREV CUR MEDS BY ELIG CLIN: HCPCS | Performed by: PHYSICIAN ASSISTANT

## 2024-10-07 ASSESSMENT — VISUAL ACUITY: OU: 1

## 2024-10-07 ASSESSMENT — ENCOUNTER SYMPTOMS
BACK PAIN: 0
COUGH: 0
VOMITING: 0
DIARRHEA: 0
SORE THROAT: 0
NAUSEA: 0
SINUS PRESSURE: 0
CHEST TIGHTNESS: 0
SINUS PAIN: 0
SHORTNESS OF BREATH: 0
ABDOMINAL PAIN: 0

## 2024-10-07 NOTE — PROGRESS NOTES
Subjective  Amy Irizarry 1980 is a 44 y.o. female who presents today with:  No chief complaint on file.      HPI  Anxiety Depression  - Zoloft 100 mg, gabapentin 200 mg TID, Seroquel 75 mg, clonidine 0.1 mg  - previously seen with Bob Montana CNP. Last appointment with Bob Montana CNP on 08/16/24. Agree with psychiatric recommendations and referral to [facility] which next appointment is on 10/11/24. She is trying to get her FMLA/Disability sorted, but unfortunately has been having a hard time since the departure of Bob Montana CNP in her previous role. She needs office notes from the date that she was seen in office. We will attempt to help rectify this by sending it.      Transverse Myelitis  - patient seeing Dr. Saba.   - She is currently on gabapentin. No further recommendation advised for transverse myelitis.   - was worked up for autoimmune conditions such as neuromyelitis optica. Ruled out. Patient diagnosed with transverse myelitis. Patient note having trouble with left upper extremity.  - advised to f/u with pain management and possible botox injection?     Review of Systems   Constitutional:  Negative for activity change, appetite change, chills, fatigue and fever.   HENT:  Negative for congestion, drooling, sinus pressure, sinus pain and sore throat.    Eyes:  Negative for visual disturbance.   Respiratory:  Negative for cough, chest tightness and shortness of breath.    Cardiovascular:  Negative for chest pain.   Gastrointestinal:  Negative for abdominal pain, diarrhea, nausea and vomiting.   Endocrine: Negative for cold intolerance.   Genitourinary:  Negative for dysuria, flank pain, frequency and hematuria.   Musculoskeletal:  Negative for arthralgias and back pain.   Skin:  Negative for rash.   Allergic/Immunologic: Negative for food allergies.   Neurological:  Negative for weakness, light-headedness, numbness and headaches.   Hematological:  Does not bruise/bleed easily.

## 2024-10-29 NOTE — PROGRESS NOTES
document care preferences in state-specific advance directives. Also reviewed the process of designating a trusted capable adult as an Agent (or Health Care Power of ) to make health care decisions for the patient if the patient becomes unable due to incapacity. Patient was asked to complete advance directive forms, if not already done, and to provide a dated, signed and witnessed (or notarized) copy, per the forms' requirements, to the practice office.    Time spent (minutes): <16 minutes (Non-Billable)

## 2024-10-30 ENCOUNTER — OFFICE VISIT (OUTPATIENT)
Dept: FAMILY MEDICINE CLINIC | Age: 44
End: 2024-10-30

## 2024-10-30 VITALS
BODY MASS INDEX: 38.11 KG/M2 | DIASTOLIC BLOOD PRESSURE: 76 MMHG | HEART RATE: 65 BPM | HEIGHT: 64 IN | OXYGEN SATURATION: 98 % | SYSTOLIC BLOOD PRESSURE: 126 MMHG | TEMPERATURE: 97.5 F | RESPIRATION RATE: 16 BRPM

## 2024-10-30 DIAGNOSIS — Z12.31 SCREENING MAMMOGRAM FOR BREAST CANCER: Primary | ICD-10-CM

## 2024-10-30 RX ORDER — MIRTAZAPINE 15 MG/1
7.5 TABLET, FILM COATED ORAL NIGHTLY
COMMUNITY

## 2024-10-30 NOTE — PATIENT INSTRUCTIONS
29.9 is considered overweight. A BMI of 30 or higher is considered obese.  If your BMI is in the normal range, it means that you have a lower risk for weight-related health problems. If your BMI is in the overweight or obese range, you may be at increased risk for weight-related health problems, such as high blood pressure, heart disease, stroke, arthritis or joint pain, and diabetes. If your BMI is in the underweight range, you may be at increased risk for health problems such as fatigue, lower protection (immunity) against illness, muscle loss, bone loss, hair loss, and hormone problems.  BMI is just one measure of your risk for weight-related health problems. You may be at higher risk for health problems if you are not active, you eat an unhealthy diet, or you drink too much alcohol or use tobacco products.  Follow-up care is a key part of your treatment and safety. Be sure to make and go to all appointments, and call your doctor if you are having problems. It's also a good idea to know your test results and keep a list of the medicines you take.  How can you care for yourself at home?  Practice healthy eating habits. This includes eating plenty of fruits, vegetables, whole grains, lean protein, and low-fat dairy.  If your doctor recommends it, get more exercise. Walking is a good choice. Bit by bit, increase the amount you walk every day. Try for at least 30 minutes on most days of the week.  Do not smoke. Smoking can increase your risk for health problems. If you need help quitting, talk to your doctor about stop-smoking programs and medicines. These can increase your chances of quitting for good.  Limit alcohol to 2 drinks a day for men and 1 drink a day for women. Too much alcohol can cause health problems.  If you have a BMI higher than 25  Your doctor may do other tests to check your risk for weight-related health problems. This may include measuring the distance around your waist. A waist measurement of

## 2024-11-16 ENCOUNTER — APPOINTMENT (OUTPATIENT)
Dept: ULTRASOUND IMAGING | Age: 44
End: 2024-11-16
Payer: COMMERCIAL

## 2024-11-16 ENCOUNTER — APPOINTMENT (OUTPATIENT)
Dept: CT IMAGING | Age: 44
End: 2024-11-16
Payer: COMMERCIAL

## 2024-11-16 ENCOUNTER — HOSPITAL ENCOUNTER (EMERGENCY)
Age: 44
Discharge: HOME | End: 2024-11-16
Payer: COMMERCIAL

## 2024-11-16 VITALS
WEIGHT: 225 LBS | HEIGHT: 64 IN | TEMPERATURE: 98.7 F | BODY MASS INDEX: 38.41 KG/M2 | DIASTOLIC BLOOD PRESSURE: 83 MMHG | HEART RATE: 77 BPM | OXYGEN SATURATION: 100 % | SYSTOLIC BLOOD PRESSURE: 120 MMHG | RESPIRATION RATE: 18 BRPM

## 2024-11-16 DIAGNOSIS — N83.202 LEFT OVARIAN CYST: ICD-10-CM

## 2024-11-16 DIAGNOSIS — K59.00 CONSTIPATION, UNSPECIFIED CONSTIPATION TYPE: Primary | ICD-10-CM

## 2024-11-16 LAB
ALBUMIN SERPL-MCNC: 4.2 G/DL (ref 3.5–4.6)
ALP SERPL-CCNC: 90 U/L (ref 40–130)
ALT SERPL-CCNC: 6 U/L (ref 0–33)
ANION GAP SERPL CALCULATED.3IONS-SCNC: 11 MEQ/L (ref 9–15)
AST SERPL-CCNC: 15 U/L (ref 0–35)
BASOPHILS # BLD: 0.1 K/UL (ref 0–0.2)
BASOPHILS NFR BLD: 0.8 %
BILIRUB SERPL-MCNC: 0.5 MG/DL (ref 0.2–0.7)
BILIRUB UR QL STRIP: NEGATIVE
BUN SERPL-MCNC: 9 MG/DL (ref 6–20)
CALCIUM SERPL-MCNC: 8.2 MG/DL (ref 8.5–9.9)
CHLORIDE SERPL-SCNC: 102 MEQ/L (ref 95–107)
CLARITY UR: ABNORMAL
CO2 SERPL-SCNC: 25 MEQ/L (ref 20–31)
COLOR UR: YELLOW
CREAT SERPL-MCNC: 0.86 MG/DL (ref 0.5–0.9)
EOSINOPHIL # BLD: 0.2 K/UL (ref 0–0.7)
EOSINOPHIL NFR BLD: 2.7 %
ERYTHROCYTE [DISTWIDTH] IN BLOOD BY AUTOMATED COUNT: 15.2 % (ref 11.5–14.5)
GLOBULIN SER CALC-MCNC: 3.4 G/DL (ref 2.3–3.5)
GLUCOSE SERPL-MCNC: 136 MG/DL (ref 70–99)
GLUCOSE UR STRIP-MCNC: NEGATIVE MG/DL
HCT VFR BLD AUTO: 41.4 % (ref 37–47)
HGB BLD-MCNC: 12.8 G/DL (ref 12–16)
HGB UR QL STRIP: NEGATIVE
KETONES UR STRIP-MCNC: 15 MG/DL
LACTATE BLDV-SCNC: 1.8 MMOL/L (ref 0.5–2.2)
LEUKOCYTE ESTERASE UR QL STRIP: NEGATIVE
LIPASE SERPL-CCNC: 18 U/L (ref 12–95)
LYMPHOCYTES # BLD: 2.2 K/UL (ref 1–4.8)
LYMPHOCYTES NFR BLD: 30.4 %
MCH RBC QN AUTO: 25.8 PG (ref 27–31.3)
MCHC RBC AUTO-ENTMCNC: 30.9 % (ref 33–37)
MCV RBC AUTO: 83.5 FL (ref 79.4–94.8)
MONOCYTES # BLD: 0.5 K/UL (ref 0.2–0.8)
MONOCYTES NFR BLD: 7.5 %
NEUTROPHILS # BLD: 4.2 K/UL (ref 1.4–6.5)
NEUTS SEG NFR BLD: 58.3 %
NITRITE UR QL STRIP: NEGATIVE
PERFORMED ON: NORMAL
PH UR STRIP: 5.5 [PH] (ref 5–9)
PLATELET # BLD AUTO: 385 K/UL (ref 130–400)
POC CREATININE WHOLE BLOOD: 0.9
POC CREATININE: 0.9 MG/DL (ref 0.6–1.2)
POC SAMPLE TYPE: NORMAL
POTASSIUM SERPL-SCNC: 3.9 MEQ/L (ref 3.4–4.9)
PROT SERPL-MCNC: 7.6 G/DL (ref 6.3–8)
PROT UR STRIP-MCNC: NEGATIVE MG/DL
RBC # BLD AUTO: 4.96 M/UL (ref 4.2–5.4)
SODIUM SERPL-SCNC: 138 MEQ/L (ref 135–144)
SP GR UR STRIP: 1.03 (ref 1–1.03)
URINE REFLEX TO CULTURE: ABNORMAL
UROBILINOGEN UR STRIP-ACNC: 1 E.U./DL
WBC # BLD AUTO: 7.1 K/UL (ref 4.8–10.8)

## 2024-11-16 PROCEDURE — 81003 URINALYSIS AUTO W/O SCOPE: CPT

## 2024-11-16 PROCEDURE — 83690 ASSAY OF LIPASE: CPT

## 2024-11-16 PROCEDURE — 74177 CT ABD & PELVIS W/CONTRAST: CPT

## 2024-11-16 PROCEDURE — 93975 VASCULAR STUDY: CPT

## 2024-11-16 PROCEDURE — 99285 EMERGENCY DEPT VISIT HI MDM: CPT

## 2024-11-16 PROCEDURE — 76856 US EXAM PELVIC COMPLETE: CPT

## 2024-11-16 PROCEDURE — 6360000002 HC RX W HCPCS

## 2024-11-16 PROCEDURE — 6370000000 HC RX 637 (ALT 250 FOR IP)

## 2024-11-16 PROCEDURE — 76830 TRANSVAGINAL US NON-OB: CPT

## 2024-11-16 PROCEDURE — 96374 THER/PROPH/DIAG INJ IV PUSH: CPT

## 2024-11-16 PROCEDURE — 83605 ASSAY OF LACTIC ACID: CPT

## 2024-11-16 PROCEDURE — 80053 COMPREHEN METABOLIC PANEL: CPT

## 2024-11-16 PROCEDURE — 85025 COMPLETE CBC W/AUTO DIFF WBC: CPT

## 2024-11-16 PROCEDURE — 6360000004 HC RX CONTRAST MEDICATION

## 2024-11-16 RX ORDER — HYDROXYZINE PAMOATE 25 MG/1
25 CAPSULE ORAL ONCE
Status: COMPLETED | OUTPATIENT
Start: 2024-11-16 | End: 2024-11-16

## 2024-11-16 RX ORDER — ONDANSETRON 2 MG/ML
4 INJECTION INTRAMUSCULAR; INTRAVENOUS ONCE
Status: DISCONTINUED | OUTPATIENT
Start: 2024-11-16 | End: 2024-11-16 | Stop reason: HOSPADM

## 2024-11-16 RX ORDER — IOPAMIDOL 755 MG/ML
75 INJECTION, SOLUTION INTRAVASCULAR
Status: COMPLETED | OUTPATIENT
Start: 2024-11-16 | End: 2024-11-16

## 2024-11-16 RX ORDER — POLYETHYLENE GLYCOL 3350 17 G/17G
17 POWDER, FOR SOLUTION ORAL DAILY PRN
Qty: 14 PACKET | Refills: 0 | Status: SHIPPED | OUTPATIENT
Start: 2024-11-16 | End: 2024-11-30

## 2024-11-16 RX ORDER — IBUPROFEN 600 MG/1
600 TABLET, FILM COATED ORAL 3 TIMES DAILY PRN
Qty: 30 TABLET | Refills: 0 | Status: SHIPPED | OUTPATIENT
Start: 2024-11-16

## 2024-11-16 RX ORDER — KETOROLAC TROMETHAMINE 30 MG/ML
30 INJECTION, SOLUTION INTRAMUSCULAR; INTRAVENOUS ONCE
Status: COMPLETED | OUTPATIENT
Start: 2024-11-16 | End: 2024-11-16

## 2024-11-16 RX ADMIN — HYDROXYZINE PAMOATE 25 MG: 25 CAPSULE ORAL at 11:58

## 2024-11-16 RX ADMIN — IOPAMIDOL 75 ML: 755 INJECTION, SOLUTION INTRAVENOUS at 13:26

## 2024-11-16 RX ADMIN — KETOROLAC TROMETHAMINE 30 MG: 30 INJECTION, SOLUTION INTRAMUSCULAR at 11:56

## 2024-11-16 ASSESSMENT — ENCOUNTER SYMPTOMS
ABDOMINAL PAIN: 1
VOMITING: 0
COUGH: 0
DIARRHEA: 0
NAUSEA: 1
SHORTNESS OF BREATH: 0
PHOTOPHOBIA: 0
CONSTIPATION: 1

## 2024-11-16 ASSESSMENT — PAIN SCALES - GENERAL
PAINLEVEL_OUTOF10: 2
PAINLEVEL_OUTOF10: 7

## 2024-11-16 ASSESSMENT — PAIN DESCRIPTION - DESCRIPTORS: DESCRIPTORS: ACHING

## 2024-11-16 ASSESSMENT — LIFESTYLE VARIABLES
HOW OFTEN DO YOU HAVE A DRINK CONTAINING ALCOHOL: MONTHLY OR LESS
HOW MANY STANDARD DRINKS CONTAINING ALCOHOL DO YOU HAVE ON A TYPICAL DAY: 1 OR 2

## 2024-11-16 ASSESSMENT — PAIN - FUNCTIONAL ASSESSMENT: PAIN_FUNCTIONAL_ASSESSMENT: 0-10

## 2024-11-16 ASSESSMENT — PAIN DESCRIPTION - LOCATION
LOCATION: ABDOMEN
LOCATION: ABDOMEN

## 2024-11-16 ASSESSMENT — PAIN DESCRIPTION - ORIENTATION
ORIENTATION: LEFT;LOWER
ORIENTATION: LEFT;LOWER

## 2024-11-16 NOTE — ED PROVIDER NOTES
[facility] ED  EMERGENCY DEPARTMENT ENCOUNTER      Pt Name: Amy Irizarry  MRN: [id number]  Birthdate 1980  Date of evaluation: 11/16/2024  Provider: JOSE EDUARDO Smith  11:29 AM EST      CHIEF COMPLAINT       Chief Complaint   Patient presents with    Abdominal Pain     Left lower quadrant           HISTORY OF PRESENT ILLNESS   (Location/Symptom, Timing/Onset, Context/Setting, Quality, Duration, Modifying Factors, Severity)  Note limiting factors.   Amy Irizarry is a 44 y.o. female who presents to the emergency department with PMHx anxiety, MS, depressive disorder, hypothyroidism, migraines, transverse myelitis.  Patient presents to the ED via private vehicle from urgent care.  Patient had presented to the urgent care this morning for evaluation of left lower quadrant abdominal/pelvic pain. Was referred to the ED for imaging. Patient states LLQ/L sided pelvic pain, present for the past 3 days.  Intermittent.  Will intensify in waves.  Seems to be worsened by movement/activity.  Patient states that she chronically has nausea, is nauseous right now, does not feel the nausea has been significantly worsened from her baseline though. Denies vomiting. Patient states she has not had a full bowel movement since probably Tuesday, 5 days ago.  States she has been passing flatus since then.  Patient endorsing longstanding history of constipation.  Patient denies any melena, hematochezia, denies fever or chills, denies dysuria, hematuria, urinary urgency or frequency, denies flank pain, denies abnormal vaginal bleeding or discharge.  Patient states that she has had pain similar to this in the past and knows when she had a cyst on her ovary.  Patient states she had a hysterectomy but still has her left ovary. Nothing given at  or taken at home prior to arrival for the pain.  Patient is also tearful and anxious on arrival, states that she has panic disorder and requesting something for  and recommended to come here for advanced imaging.  Patient states history of left-sided ovarian cyst that has presented similarly in the past.  She has had a hysterectomy but still has her left ovary.    Patient was given IV Toradol, IV Zofran on arrival, reassessed multiple occasions, states she is feeling better, does not need any further medicines.  Labs reassuring, there is no leukocytosis, no lactic acidosis, no acute anemia, no transaminitis, lipase within normal limits, no significant electrolyte derangement, no FIDENCIO.  Urinalysis is not clinically significant.  CT abdomen pelvis demonstrates evidence of mild to moderate constipation which clinically fits with her presentation.  Additionally pelvic ultrasound shows evidence of several small size follicles in the left ovary with a dominant follicular sized cyst of 1.2 x 1.4 cm.  Normal vascularity.  Otherwise normal study.  I reviewed findings with patient both her constipation and ovarian cyst may be contributing to her symptoms at this time.  She remains with a nonsurgical abdominal exam.  She remains well-appearing.  She remains with controlled symptoms.  I advised use of MiraLAX, dietary changes for a bowel regimen for home, prescription provided, also given anti-inflammatory should pain be associated with her cyst, she agrees with plan for discharge at this time, she will follow-up with primary as well as OB/GYN.    REASSESSMENT         CRITICAL CARE TIME   Total Critical Care time was 0 minutes, excluding separately reportable procedures.  There was a high probability of clinically significant/life threatening deterioration in the patient's condition which required my urgent intervention.      CONSULTS:  None    PROCEDURES:  Unless otherwise noted below, none     Procedures        FINAL IMPRESSION      1. Constipation, unspecified constipation type    2. Left ovarian cyst          DISPOSITION/PLAN   DISPOSITION Decision To Discharge 11/16/2024 02:25:47 PM

## 2024-11-16 NOTE — ED NOTES
Discharge education reviewed.  Patient instructed to follow up with PCP/OBGYN and come back to the ED with any new or worsening symptoms.   No questions or concerns at this time.  Patient is ambulatory out of the ED.

## 2024-11-16 NOTE — ED TRIAGE NOTES
Pt presents to ER with left lower quadrant pain that started three days ago, pt does not normally have good bowel movements and has not had one since Wednesday. Pt denies any nausea or vomiting at this time. Pt is A&Ox4, vitals are stable, warm and dry at this time.

## 2025-01-11 DIAGNOSIS — E89.0 POSTOPERATIVE HYPOTHYROIDISM: ICD-10-CM

## 2025-01-11 DIAGNOSIS — Z12.31 SCREENING MAMMOGRAM FOR BREAST CANCER: ICD-10-CM

## 2025-01-11 DIAGNOSIS — E16.2 HYPOGLYCEMIA: ICD-10-CM

## 2025-01-11 LAB
ANION GAP SERPL CALCULATED.3IONS-SCNC: 9 MEQ/L (ref 9–15)
BUN SERPL-MCNC: 13 MG/DL (ref 6–20)
CALCIUM SERPL-MCNC: 9 MG/DL (ref 8.5–9.9)
CHLORIDE SERPL-SCNC: 105 MEQ/L (ref 95–107)
CO2 SERPL-SCNC: 22 MEQ/L (ref 20–31)
CREAT SERPL-MCNC: 0.95 MG/DL (ref 0.5–0.9)
GLUCOSE SERPL-MCNC: 90 MG/DL (ref 70–99)
POTASSIUM SERPL-SCNC: 3.8 MEQ/L (ref 3.4–4.9)
SODIUM SERPL-SCNC: 136 MEQ/L (ref 135–144)
T4 FREE SERPL-MCNC: 1.26 NG/DL (ref 0.84–1.68)
TSH SERPL-MCNC: 18.96 UIU/ML (ref 0.44–3.86)

## 2025-01-12 LAB
ESTIMATED AVERAGE GLUCOSE: 126 MG/DL
HBA1C MFR BLD: 6 % (ref 4–6)

## 2025-01-16 ENCOUNTER — OFFICE VISIT (OUTPATIENT)
Dept: ENDOCRINOLOGY | Age: 45
End: 2025-01-16

## 2025-01-16 VITALS
BODY MASS INDEX: 38.82 KG/M2 | WEIGHT: 222.66 LBS | DIASTOLIC BLOOD PRESSURE: 64 MMHG | SYSTOLIC BLOOD PRESSURE: 99 MMHG | OXYGEN SATURATION: 97 %

## 2025-01-16 DIAGNOSIS — E89.0 POSTOPERATIVE HYPOTHYROIDISM: Primary | ICD-10-CM

## 2025-01-16 DIAGNOSIS — E88.819 INSULIN RESISTANCE: ICD-10-CM

## 2025-01-16 DIAGNOSIS — E66.9 OBESITY (BMI 30-39.9): ICD-10-CM

## 2025-01-16 DIAGNOSIS — E78.2 MIXED HYPERLIPIDEMIA: ICD-10-CM

## 2025-01-16 RX ORDER — LEVOTHYROXINE SODIUM 200 UG/1
200 TABLET ORAL DAILY
Qty: 90 TABLET | Refills: 3 | Status: SHIPPED | OUTPATIENT
Start: 2025-01-16

## 2025-01-16 RX ORDER — CLONAZEPAM 0.5 MG/1
0.5 TABLET ORAL
COMMUNITY
Start: 2024-12-24

## 2025-01-16 RX ORDER — ACETAZOLAMIDE 250 MG/1
500 TABLET ORAL EVERY 12 HOURS
COMMUNITY
Start: 2025-01-02 | End: 2025-08-14

## 2025-01-16 RX ORDER — LEVOTHYROXINE SODIUM 75 UG/1
75 TABLET ORAL DAILY
Qty: 90 TABLET | Refills: 1 | Status: SHIPPED | OUTPATIENT
Start: 2025-01-16

## 2025-01-16 RX ORDER — BUSPIRONE HYDROCHLORIDE 10 MG/1
10 TABLET ORAL 3 TIMES DAILY
COMMUNITY
Start: 2024-12-17

## 2025-01-16 RX ORDER — TOPIRAMATE 50 MG/1
50 TABLET, FILM COATED ORAL 2 TIMES DAILY
Qty: 60 TABLET | Refills: 3 | Status: SHIPPED | OUTPATIENT
Start: 2025-01-16

## 2025-01-16 NOTE — PROGRESS NOTES
appearance. She is obese.   HENT:      Head: Normocephalic and atraumatic.      Right Ear: External ear normal.      Left Ear: External ear normal.      Nose: Nose normal.   Eyes:      General: No scleral icterus.        Right eye: No discharge.         Left eye: No discharge.      Extraocular Movements: Extraocular movements intact.      Conjunctiva/sclera: Conjunctivae normal.   Cardiovascular:      Rate and Rhythm: Normal rate.   Pulmonary:      Effort: Pulmonary effort is normal.   Musculoskeletal:         General: Normal range of motion.      Cervical back: Normal range of motion and neck supple.   Neurological:      General: No focal deficit present.      Mental Status: She is alert and oriented to person, place, and time.   Psychiatric:         Mood and Affect: Mood normal.         Behavior: Behavior normal.

## 2025-03-21 RX ORDER — METFORMIN HYDROCHLORIDE 500 MG/1
TABLET, EXTENDED RELEASE ORAL
Qty: 60 TABLET | Refills: 1 | Status: SHIPPED | OUTPATIENT
Start: 2025-03-21

## 2025-05-07 ENCOUNTER — OFFICE VISIT (OUTPATIENT)
Age: 45
End: 2025-05-07
Payer: COMMERCIAL

## 2025-05-07 VITALS
BODY MASS INDEX: 40.57 KG/M2 | TEMPERATURE: 98 F | DIASTOLIC BLOOD PRESSURE: 80 MMHG | HEIGHT: 63 IN | SYSTOLIC BLOOD PRESSURE: 124 MMHG | OXYGEN SATURATION: 99 % | HEART RATE: 66 BPM | WEIGHT: 229 LBS

## 2025-05-07 DIAGNOSIS — M54.41 ACUTE RIGHT-SIDED LOW BACK PAIN WITH RIGHT-SIDED SCIATICA: Primary | ICD-10-CM

## 2025-05-07 DIAGNOSIS — Z87.442 HISTORY OF KIDNEY STONES: ICD-10-CM

## 2025-05-07 DIAGNOSIS — M62.830 MUSCLE SPASM OF BACK: ICD-10-CM

## 2025-05-07 DIAGNOSIS — R10.31 GROIN PAIN, RIGHT: ICD-10-CM

## 2025-05-07 LAB
BILIRUBIN, POC: NEGATIVE
BLOOD URINE, POC: NEGATIVE
CLARITY, POC: CLEAR
COLOR, POC: YELLOW
GLUCOSE URINE, POC: NEGATIVE MG/DL
KETONES, POC: NEGATIVE MG/DL
LEUKOCYTE EST, POC: NEGATIVE
NITRITE, POC: NEGATIVE
PH, POC: 6
PROTEIN, POC: NEGATIVE MG/DL
SPECIFIC GRAVITY, POC: 1.02
UROBILINOGEN, POC: 0.2 MG/DL

## 2025-05-07 PROCEDURE — 1036F TOBACCO NON-USER: CPT | Performed by: NURSE PRACTITIONER

## 2025-05-07 PROCEDURE — 99213 OFFICE O/P EST LOW 20 MIN: CPT | Performed by: NURSE PRACTITIONER

## 2025-05-07 PROCEDURE — G8427 DOCREV CUR MEDS BY ELIG CLIN: HCPCS | Performed by: NURSE PRACTITIONER

## 2025-05-07 PROCEDURE — 81003 URINALYSIS AUTO W/O SCOPE: CPT | Performed by: NURSE PRACTITIONER

## 2025-05-07 PROCEDURE — G8417 CALC BMI ABV UP PARAM F/U: HCPCS | Performed by: NURSE PRACTITIONER

## 2025-05-07 PROCEDURE — 99212 OFFICE O/P EST SF 10 MIN: CPT | Performed by: NURSE PRACTITIONER

## 2025-05-07 PROCEDURE — PBSHW POCT URINALYSIS DIPSTICK W/O MICROSCOPE (AUTO): Performed by: NURSE PRACTITIONER

## 2025-05-07 RX ORDER — METHOCARBAMOL 500 MG/1
500 TABLET, FILM COATED ORAL 3 TIMES DAILY
Qty: 30 TABLET | Refills: 0 | Status: SHIPPED | OUTPATIENT
Start: 2025-05-07

## 2025-05-07 RX ORDER — IBUPROFEN 600 MG/1
600 TABLET, FILM COATED ORAL EVERY 6 HOURS PRN
Qty: 30 TABLET | Refills: 0 | Status: SHIPPED | OUTPATIENT
Start: 2025-05-07

## 2025-05-07 SDOH — ECONOMIC STABILITY: FOOD INSECURITY: WITHIN THE PAST 12 MONTHS, YOU WORRIED THAT YOUR FOOD WOULD RUN OUT BEFORE YOU GOT MONEY TO BUY MORE.: NEVER TRUE

## 2025-05-07 SDOH — ECONOMIC STABILITY: FOOD INSECURITY: WITHIN THE PAST 12 MONTHS, THE FOOD YOU BOUGHT JUST DIDN'T LAST AND YOU DIDN'T HAVE MONEY TO GET MORE.: NEVER TRUE

## 2025-05-07 ASSESSMENT — PATIENT HEALTH QUESTIONNAIRE - PHQ9
6. FEELING BAD ABOUT YOURSELF - OR THAT YOU ARE A FAILURE OR HAVE LET YOURSELF OR YOUR FAMILY DOWN: NOT AT ALL
5. POOR APPETITE OR OVEREATING: NOT AT ALL
4. FEELING TIRED OR HAVING LITTLE ENERGY: NOT AT ALL
10. IF YOU CHECKED OFF ANY PROBLEMS, HOW DIFFICULT HAVE THESE PROBLEMS MADE IT FOR YOU TO DO YOUR WORK, TAKE CARE OF THINGS AT HOME, OR GET ALONG WITH OTHER PEOPLE: NOT DIFFICULT AT ALL
SUM OF ALL RESPONSES TO PHQ QUESTIONS 1-9: 0
3. TROUBLE FALLING OR STAYING ASLEEP: NOT AT ALL
SUM OF ALL RESPONSES TO PHQ QUESTIONS 1-9: 0
8. MOVING OR SPEAKING SO SLOWLY THAT OTHER PEOPLE COULD HAVE NOTICED. OR THE OPPOSITE, BEING SO FIGETY OR RESTLESS THAT YOU HAVE BEEN MOVING AROUND A LOT MORE THAN USUAL: NOT AT ALL
1. LITTLE INTEREST OR PLEASURE IN DOING THINGS: NOT AT ALL
7. TROUBLE CONCENTRATING ON THINGS, SUCH AS READING THE NEWSPAPER OR WATCHING TELEVISION: NOT AT ALL
2. FEELING DOWN, DEPRESSED OR HOPELESS: NOT AT ALL
SUM OF ALL RESPONSES TO PHQ QUESTIONS 1-9: 0
SUM OF ALL RESPONSES TO PHQ QUESTIONS 1-9: 0
9. THOUGHTS THAT YOU WOULD BE BETTER OFF DEAD, OR OF HURTING YOURSELF: NOT AT ALL

## 2025-05-07 ASSESSMENT — ENCOUNTER SYMPTOMS
BACK PAIN: 1
ABDOMINAL PAIN: 0

## 2025-05-07 NOTE — PROGRESS NOTES
Subjective  Amyrahel Irizarry, 44 y.o. female presents today with:  Chief Complaint   Patient presents with    Other     Right lower back pain started last Thursday and just gotten worse. Cindy feels like kidney stone but not positive        HPI  Presents to walk-in clinic for back pain   Symptoms began after doing yard work  Affected site: right lumbar, radiates to right hip to right groin   Describes as spasm & burning   Sleep interrupted d/t certain movements exacerbating symptoms   Hot showers have helped   Denies change in bowel or bladder function   Denies numbness, tingling or weakness of lower extremities   Denies dysuria   Denies hematuria   Denies frequency/urgency of urination   Tylenol & ibuprofen 200mg, Thurs & Friday  Lido patch on today   History of sciatica                       Past Medical History:   Diagnosis Date    Anxiety     Arthritis     Depression     PONV (postoperative nausea and vomiting)     Thyroid disease       Past Surgical History:   Procedure Laterality Date    COLONOSCOPY N/A 12/30/2022    COLONOSCOPY DIAGNOSTIC performed by Lonnie Lopez MD at [facility]    DENTAL SURGERY      HYSTERECTOMY, TOTAL ABDOMINAL (CERVIX REMOVED) N/A 4/17/2023    EUA, TOTAL ABDOMINAL HYSTERECTOMY WITH BILATERAL SALPINGECTOMY, RIGHT OOPHORECTOMY performed by Darlene Yanez DO at [facility]    THYROIDECTOMY      TUBAL LIGATION      UPPER GASTROINTESTINAL ENDOSCOPY N/A 12/30/2022    EGD ESOPHAGOGASTRODUODENOSCOPY performed by Lonnie Lopez MD at [facility]    US BREAST FINE NEEDLE ASPIRATION Right      Family History   Problem Relation Age of Onset    No Known Problems Mother     Drug Abuse Father     Schizophrenia Maternal Aunt     Bipolar Disorder Maternal Aunt     Colon Cancer Paternal Grandfather              Review of Systems   Constitutional:  Positive for activity change. Negative for appetite change, chills and fever.   Gastrointestinal:  Negative for abdominal pain.

## 2025-05-08 LAB — BACTERIA UR CULT: NORMAL

## 2025-05-09 ENCOUNTER — RESULTS FOLLOW-UP (OUTPATIENT)
Age: 45
End: 2025-05-09

## 2025-05-13 ENCOUNTER — HOSPITAL ENCOUNTER (OUTPATIENT)
Dept: WOMENS IMAGING | Age: 45
Discharge: HOME | End: 2025-05-15
Payer: COMMERCIAL

## 2025-05-13 DIAGNOSIS — Z12.31 SCREENING MAMMOGRAM FOR BREAST CANCER: ICD-10-CM

## 2025-05-13 PROCEDURE — 77063 BREAST TOMOSYNTHESIS BI: CPT

## 2025-05-26 ENCOUNTER — HOSPITAL ENCOUNTER (EMERGENCY)
Age: 45
Discharge: HOME | End: 2025-05-26

## 2025-05-26 ENCOUNTER — APPOINTMENT (OUTPATIENT)
Dept: CT IMAGING | Age: 45
End: 2025-05-26
Attending: STUDENT IN AN ORGANIZED HEALTH CARE EDUCATION/TRAINING PROGRAM
Payer: COMMERCIAL

## 2025-05-26 ENCOUNTER — APPOINTMENT (OUTPATIENT)
Dept: ULTRASOUND IMAGING | Age: 45
End: 2025-05-26
Payer: COMMERCIAL

## 2025-05-26 ENCOUNTER — HOSPITAL ENCOUNTER (EMERGENCY)
Age: 45
Discharge: HOME | End: 2025-05-26
Attending: STUDENT IN AN ORGANIZED HEALTH CARE EDUCATION/TRAINING PROGRAM
Payer: COMMERCIAL

## 2025-05-26 VITALS
TEMPERATURE: 98.1 F | OXYGEN SATURATION: 99 % | SYSTOLIC BLOOD PRESSURE: 129 MMHG | RESPIRATION RATE: 20 BRPM | HEART RATE: 87 BPM | WEIGHT: 224 LBS | DIASTOLIC BLOOD PRESSURE: 89 MMHG | HEIGHT: 63 IN | BODY MASS INDEX: 39.69 KG/M2

## 2025-05-26 DIAGNOSIS — R10.9 ACUTE FLANK PAIN: Primary | ICD-10-CM

## 2025-05-26 DIAGNOSIS — K59.00 CONSTIPATION, UNSPECIFIED CONSTIPATION TYPE: ICD-10-CM

## 2025-05-26 DIAGNOSIS — S29.012A STRAIN OF MUSCLE AND TENDON OF BACK WALL OF THORAX, INITIAL ENCOUNTER: ICD-10-CM

## 2025-05-26 LAB
ALBUMIN SERPL-MCNC: 4.3 G/DL (ref 3.5–4.6)
ALP SERPL-CCNC: 82 U/L (ref 40–130)
ALT SERPL-CCNC: 18 U/L (ref 0–33)
ANION GAP SERPL CALCULATED.3IONS-SCNC: 10 MEQ/L (ref 9–15)
AST SERPL-CCNC: 15 U/L (ref 0–35)
BASOPHILS # BLD: 0.1 K/UL (ref 0–0.2)
BASOPHILS NFR BLD: 0.8 %
BILIRUB SERPL-MCNC: 0.4 MG/DL (ref 0.2–0.7)
BILIRUB UR QL STRIP: NEGATIVE
BUN SERPL-MCNC: 9 MG/DL (ref 6–20)
CALCIUM SERPL-MCNC: 9 MG/DL (ref 8.5–9.9)
CHLORIDE SERPL-SCNC: 105 MEQ/L (ref 95–107)
CLARITY UR: CLEAR
CO2 SERPL-SCNC: 25 MEQ/L (ref 20–31)
COLOR UR: YELLOW
CREAT SERPL-MCNC: 0.88 MG/DL (ref 0.5–0.9)
EOSINOPHIL # BLD: 0.2 K/UL (ref 0–0.7)
EOSINOPHIL NFR BLD: 2.3 %
ERYTHROCYTE [DISTWIDTH] IN BLOOD BY AUTOMATED COUNT: 14.6 % (ref 11.5–14.5)
GLOBULIN SER CALC-MCNC: 3.7 G/DL (ref 2.3–3.5)
GLUCOSE SERPL-MCNC: 104 MG/DL (ref 70–99)
GLUCOSE UR STRIP-MCNC: NEGATIVE MG/DL
HCT VFR BLD AUTO: 41 % (ref 37–47)
HGB BLD-MCNC: 13 G/DL (ref 12–16)
HGB UR QL STRIP: NEGATIVE
KETONES UR STRIP-MCNC: ABNORMAL MG/DL
LEUKOCYTE ESTERASE UR QL STRIP: NEGATIVE
LIPASE SERPL-CCNC: 19 U/L (ref 12–95)
LYMPHOCYTES # BLD: 2.2 K/UL (ref 1–4.8)
LYMPHOCYTES NFR BLD: 30.4 %
MCH RBC QN AUTO: 27.3 PG (ref 27–31.3)
MCHC RBC AUTO-ENTMCNC: 31.7 % (ref 33–37)
MCV RBC AUTO: 86.1 FL (ref 79.4–94.8)
MONOCYTES # BLD: 0.6 K/UL (ref 0.2–0.8)
MONOCYTES NFR BLD: 7.7 %
NEUTROPHILS # BLD: 4.2 K/UL (ref 1.4–6.5)
NEUTS SEG NFR BLD: 58.4 %
NITRITE UR QL STRIP: NEGATIVE
PH UR STRIP: 5 [PH] (ref 5–9)
PLATELET # BLD AUTO: 376 K/UL (ref 130–400)
POTASSIUM SERPL-SCNC: 4.1 MEQ/L (ref 3.4–4.9)
PROT SERPL-MCNC: 8 G/DL (ref 6.3–8)
PROT UR STRIP-MCNC: NEGATIVE MG/DL
RBC # BLD AUTO: 4.76 M/UL (ref 4.2–5.4)
SODIUM SERPL-SCNC: 140 MEQ/L (ref 135–144)
SP GR UR STRIP: 1.03 (ref 1–1.03)
URINE REFLEX TO CULTURE: ABNORMAL
UROBILINOGEN UR STRIP-ACNC: 0.2 E.U./DL
WBC # BLD AUTO: 7.2 K/UL (ref 4.8–10.8)

## 2025-05-26 PROCEDURE — 81003 URINALYSIS AUTO W/O SCOPE: CPT

## 2025-05-26 PROCEDURE — 74176 CT ABD & PELVIS W/O CONTRAST: CPT

## 2025-05-26 PROCEDURE — 2580000003 HC RX 258: Performed by: STUDENT IN AN ORGANIZED HEALTH CARE EDUCATION/TRAINING PROGRAM

## 2025-05-26 PROCEDURE — 36415 COLL VENOUS BLD VENIPUNCTURE: CPT

## 2025-05-26 PROCEDURE — 96375 TX/PRO/DX INJ NEW DRUG ADDON: CPT

## 2025-05-26 PROCEDURE — 76705 ECHO EXAM OF ABDOMEN: CPT

## 2025-05-26 PROCEDURE — 99284 EMERGENCY DEPT VISIT MOD MDM: CPT

## 2025-05-26 PROCEDURE — 6360000002 HC RX W HCPCS: Performed by: STUDENT IN AN ORGANIZED HEALTH CARE EDUCATION/TRAINING PROGRAM

## 2025-05-26 PROCEDURE — 83690 ASSAY OF LIPASE: CPT

## 2025-05-26 PROCEDURE — 85025 COMPLETE CBC W/AUTO DIFF WBC: CPT

## 2025-05-26 PROCEDURE — 96374 THER/PROPH/DIAG INJ IV PUSH: CPT

## 2025-05-26 PROCEDURE — 80053 COMPREHEN METABOLIC PANEL: CPT

## 2025-05-26 RX ORDER — 0.9 % SODIUM CHLORIDE 0.9 %
1000 INTRAVENOUS SOLUTION INTRAVENOUS ONCE
Status: COMPLETED | OUTPATIENT
Start: 2025-05-26 | End: 2025-05-26

## 2025-05-26 RX ORDER — MORPHINE SULFATE 4 MG/ML
4 INJECTION, SOLUTION INTRAMUSCULAR; INTRAVENOUS ONCE
Refills: 0 | Status: COMPLETED | OUTPATIENT
Start: 2025-05-26 | End: 2025-05-26

## 2025-05-26 RX ORDER — ONDANSETRON 2 MG/ML
4 INJECTION INTRAMUSCULAR; INTRAVENOUS ONCE
Status: COMPLETED | OUTPATIENT
Start: 2025-05-26 | End: 2025-05-26

## 2025-05-26 RX ORDER — LORAZEPAM 1 MG/1
0.5 TABLET ORAL ONCE
Status: DISCONTINUED | OUTPATIENT
Start: 2025-05-26 | End: 2025-05-26 | Stop reason: HOSPADM

## 2025-05-26 RX ORDER — ACETAMINOPHEN 500 MG
1000 TABLET ORAL EVERY 6 HOURS PRN
Qty: 60 TABLET | Refills: 0 | Status: SHIPPED | OUTPATIENT
Start: 2025-05-26

## 2025-05-26 RX ADMIN — MORPHINE SULFATE 4 MG: 4 INJECTION, SOLUTION INTRAMUSCULAR; INTRAVENOUS at 13:26

## 2025-05-26 RX ADMIN — ONDANSETRON 4 MG: 2 INJECTION, SOLUTION INTRAMUSCULAR; INTRAVENOUS at 13:25

## 2025-05-26 RX ADMIN — FAMOTIDINE 20 MG: 10 INJECTION, SOLUTION INTRAVENOUS at 13:26

## 2025-05-26 RX ADMIN — SODIUM CHLORIDE 1000 ML: 0.9 INJECTION, SOLUTION INTRAVENOUS at 13:27

## 2025-05-26 ASSESSMENT — PAIN SCALES - GENERAL: PAINLEVEL_OUTOF10: 7

## 2025-05-26 ASSESSMENT — PAIN - FUNCTIONAL ASSESSMENT: PAIN_FUNCTIONAL_ASSESSMENT: 0-10

## 2025-05-26 ASSESSMENT — LIFESTYLE VARIABLES
HOW MANY STANDARD DRINKS CONTAINING ALCOHOL DO YOU HAVE ON A TYPICAL DAY: 1 OR 2
HOW OFTEN DO YOU HAVE A DRINK CONTAINING ALCOHOL: MONTHLY OR LESS

## 2025-05-26 NOTE — ED TRIAGE NOTES
Pt was seen in urgent care 2 weeks  ago with back pain and told it was muscular and  given muscle relaxer and ibuprofen. Medication is not work. Pt came to er today but d/t  anxiety left and went to urgent care but was sent back here. Pt  now presents with right flank pain that radiates into the abdomen and nausea.  Denies dysuria

## 2025-05-26 NOTE — ED PROVIDER NOTES
MEDICAL SCREENING EXAM     Basic Information   Time Seen: 12:02 PM   Primary Care Provider: Laura Cavanaugh PA     Chief Complaint   Patient presents with    Flank Pain     Right flank pain radiating to front. nausea      HPI   Amy Irizarry is a 44 yrs female who presents with intermittent right flank pain.  Patient states she has had kidney stones in the past and feels like this.  Patient was seen in urgent care couple weeks ago and given muscle relaxers and ibuprofen but this did not improve her symptoms.  Patient is feeling nauseous but no vomiting.  No fevers or urinary symptoms   Physical Exam     /89 (05/26/25 1152)    Temp 98.1 °F (36.7 °C) (05/26/25 1152)    Pulse 87 (05/26/25 1152)   Resp 20 (05/26/25 1152)    SpO2 99 % (05/26/25 1152)       General: Awake and Alert, no acute distress   CV: RRR, S1, S2   Resp: LCTAB, even and non labored   Other: Right CVA tenderness   Impression and Plan     Labs Reviewed   CBC WITH AUTO DIFFERENTIAL   COMPREHENSIVE METABOLIC PANEL   LIPASE   URINALYSIS WITH REFLEX TO CULTURE        CT KIDNEY WO CONTRAST    (Results Pending)      Final Impression   I have performed a medical screening exam on Amy Irizarry. Based on this patient's chief complaint/symptoms of   Chief Complaint   Patient presents with    Flank Pain     Right flank pain radiating to front. nausea    and my focused exam, their care will be started and transitioned to provider when room is available       Kelsey Hays PA-C  05/26/25 [id number]

## 2025-05-26 NOTE — ED PROVIDER NOTES
[facility] EMERGENCY DEPARTMENT  eMERGENCY dEPARTMENT eNCOUnter      Pt Name: Amy Irizarry  MRN: [id number]  Birthdate 1980  Date of evaluation: 5/26/2025  Provider: Jt Fernández MD  Note Started: 5/26/25 1:06 PM EDT    HISTORY OF PRESENT ILLNESS      Chief Complaint   Patient presents with    Flank Pain     Right flank pain radiating to front. nausea       The history is provided by the Patient and Caregiver (Dr. Varela, Urgent Care provider).  Amy Irizarry is a 44 y.o. female with a PMH clinically significant for Obesity, TANISHA, GERD, PCOS, Fibroids, Hypothyroidism, OA, Migraine HA's, IBS, Anxiety, Depression, and Panic D/o, S/P Tubal Ligation, R oophorectomy, Thyroidectomy and Hysterectomy presenting to the ED via Self c/o right-sided flank pain radiating to the right side side and upper abdomen ongoing for the past 3 weeks and associated with nausea and decreased appetite.  Patient stating symptoms have been intermittent, but are intermittently more severe.  Was more severe today.  Characterizes pain as someone punching her in the side.  Denies any radiation to the groin.  Does state it feels somewhat similar to prior kidney stones.  Denies any known history of issues with her gallbladder.  Also denies any known history of pancreatitis.  Did take a Zofran shortly prior to arrival which seemed to help a little bit with the nausea, but still has some nausea.  Is worse with laying in certain positions.  States history of regular problems with her bowel movements that are unchanged at this time.  No recent abnormalities with her normal constipation and diarrhea.  Denies any associated: F/C/D, HA, Lightheadedness, Dizziness, Cough, Hemoptysis, SOB, CP, Vomiting, Dysuria, Hematuria, Difficulty urinating, or Rash.  States that he initially went to an urgent care and was told he might be a muscle strain and was taking the medications with a little bit of relief, but symptoms seem to worsen again.    DISPOSITION Decision To Discharge 05/26/2025 02:57:03 PM   DISPOSITION CONDITION Stable           RX Monitoring Periodic Controlled Substance Monitoring Chronic Pain > 50 MEDD   6/14/2022   4:14 PM No signs of potential drug abuse or diversion identified. Re-evaluated the status of the patient's underlying condition causing pain.       Discharge Medication List as of 5/26/2025  3:17 PM        START taking these medications    Details   acetaminophen (TYLENOL) 500 MG tablet Take 2 tablets by mouth every 6 hours as needed for Pain or Fever, Disp-60 tablet, R-0Normal              Jt Fernández MD  Emergency Medicine Attending Physician    (Please note that portions of this note were completed with a voice recognition program.  Efforts were made to edit the dictations but occasionally words are mis-transcribed.)     Jt Fernández MD  05/30/25 0715

## 2025-05-29 DIAGNOSIS — E88.819 INSULIN RESISTANCE: ICD-10-CM

## 2025-05-29 DIAGNOSIS — E89.0 POSTOPERATIVE HYPOTHYROIDISM: ICD-10-CM

## 2025-05-29 DIAGNOSIS — E78.2 MIXED HYPERLIPIDEMIA: ICD-10-CM

## 2025-05-29 LAB
ANION GAP SERPL CALCULATED.3IONS-SCNC: 9 MEQ/L (ref 9–15)
BUN SERPL-MCNC: 8 MG/DL (ref 6–20)
CALCIUM SERPL-MCNC: 9 MG/DL (ref 8.5–9.9)
CHLORIDE SERPL-SCNC: 106 MEQ/L (ref 95–107)
CHOLEST SERPL-MCNC: 166 MG/DL (ref 0–199)
CO2 SERPL-SCNC: 27 MEQ/L (ref 20–31)
CREAT SERPL-MCNC: 0.8 MG/DL (ref 0.5–0.9)
GLUCOSE SERPL-MCNC: 112 MG/DL (ref 70–99)
HDLC SERPL-MCNC: 51 MG/DL (ref 40–59)
LDLC SERPL CALC-MCNC: 94 MG/DL (ref 0–129)
POTASSIUM SERPL-SCNC: 4.1 MEQ/L (ref 3.4–4.9)
SODIUM SERPL-SCNC: 142 MEQ/L (ref 135–144)
T4 FREE SERPL-MCNC: 0.79 NG/DL (ref 0.84–1.68)
TRIGL SERPL-MCNC: 105 MG/DL (ref 0–150)
TSH REFLEX: 18.44 UIU/ML (ref 0.44–3.86)

## 2025-05-30 ENCOUNTER — OFFICE VISIT (OUTPATIENT)
Age: 45
End: 2025-05-30
Payer: COMMERCIAL

## 2025-05-30 VITALS
DIASTOLIC BLOOD PRESSURE: 62 MMHG | HEART RATE: 63 BPM | HEIGHT: 63 IN | WEIGHT: 224 LBS | SYSTOLIC BLOOD PRESSURE: 105 MMHG | BODY MASS INDEX: 39.69 KG/M2

## 2025-05-30 DIAGNOSIS — E88.819 INSULIN RESISTANCE: ICD-10-CM

## 2025-05-30 DIAGNOSIS — E66.9 OBESITY (BMI 30-39.9): ICD-10-CM

## 2025-05-30 DIAGNOSIS — E89.0 POSTOPERATIVE HYPOTHYROIDISM: Primary | ICD-10-CM

## 2025-05-30 LAB
ESTIMATED AVERAGE GLUCOSE: 123 MG/DL
HBA1C MFR BLD: 5.9 % (ref 4–6)

## 2025-05-30 PROCEDURE — 1036F TOBACCO NON-USER: CPT | Performed by: INTERNAL MEDICINE

## 2025-05-30 PROCEDURE — G8417 CALC BMI ABV UP PARAM F/U: HCPCS | Performed by: INTERNAL MEDICINE

## 2025-05-30 PROCEDURE — G8427 DOCREV CUR MEDS BY ELIG CLIN: HCPCS | Performed by: INTERNAL MEDICINE

## 2025-05-30 PROCEDURE — 99213 OFFICE O/P EST LOW 20 MIN: CPT | Performed by: INTERNAL MEDICINE

## 2025-05-30 PROCEDURE — 99212 OFFICE O/P EST SF 10 MIN: CPT | Performed by: INTERNAL MEDICINE

## 2025-05-30 RX ORDER — PROPRANOLOL HYDROCHLORIDE 60 MG/1
60 CAPSULE, EXTENDED RELEASE ORAL DAILY
COMMUNITY
Start: 2025-03-31

## 2025-05-30 RX ORDER — SUMATRIPTAN SUCCINATE 100 MG/1
100 TABLET ORAL
COMMUNITY
Start: 2025-03-31

## 2025-05-30 RX ORDER — BACLOFEN 5 MG/1
5 TABLET ORAL 3 TIMES DAILY PRN
COMMUNITY
Start: 2025-05-29

## 2025-05-30 RX ORDER — QUETIAPINE FUMARATE 50 MG/1
50 TABLET, FILM COATED ORAL NIGHTLY
COMMUNITY
Start: 2025-03-11

## 2025-05-30 NOTE — PROGRESS NOTES
(ANTIVERT) 25 MG tablet, TAKE 1 TABLET BY MOUTH THREE TIMES DAILY AS NEEDED, Disp: 90 tablet, Rfl: 0    docusate sodium (COLACE, DULCOLAX) 100 MG CAPS, Take 100 mg by mouth 2 times daily, Disp: 60 capsule, Rfl: 2    scopolamine (TRANSDERM-SCOP) transdermal patch, , Disp: , Rfl:     pantoprazole (PROTONIX) 40 MG tablet, Take 1 tablet by mouth 2 times daily (before meals), Disp: 180 tablet, Rfl: 1    gabapentin (NEURONTIN) 100 MG capsule, Take 2 capsules by mouth 3 times daily for 180 days. Intended supply: 90 days, Disp: 180 capsule, Rfl: 5  Lab Results   Component Value Date     05/29/2025    K 4.1 05/29/2025     05/29/2025    CO2 27 05/29/2025    BUN 8 05/29/2025    CREATININE 0.80 05/29/2025    GLUCOSE 112 (H) 05/29/2025    CALCIUM 9.0 05/29/2025    BILITOT 0.4 05/26/2025    ALKPHOS 82 05/26/2025    AST 15 05/26/2025    ALT 18 05/26/2025    LABGLOM >90.0 05/29/2025    GFRAA >60.0 04/12/2022    GLOB 3.7 (H) 05/26/2025     Lab Results   Component Value Date    WBC 7.2 05/26/2025    HGB 13.0 05/26/2025    HCT 41.0 05/26/2025    MCV 86.1 05/26/2025     05/26/2025     Lab Results   Component Value Date    LABA1C 5.9 05/29/2025    LABA1C 6.0 01/11/2025    LABA1C 6.2 (H) 09/13/2024     Lab Results   Component Value Date    HDL 51 05/29/2025    HDL 66 (H) 12/16/2023    CHOL 166 05/29/2025    CHOL 155 12/16/2023    TRIG 105 05/29/2025    TRIG 68 12/16/2023     No results found for: \"TESTM\"  Lab Results   Component Value Date    TSH 18.440 (H) 05/29/2025    TSH 18.960 (H) 01/11/2025    TSH 0.031 (L) 09/13/2024    T4FREE 0.79 (L) 05/29/2025    T4FREE 1.26 01/11/2025    T4FREE 1.73 (H) 09/13/2024     No results found for: \"TPOABS\"    Review of Systems   Cardiovascular: Negative.    Musculoskeletal:  Positive for arthralgias.   All other systems reviewed and are negative.      Objective:   Physical Exam  Vitals reviewed.   Constitutional:       General: She is not in acute distress.     Appearance: Normal

## 2025-06-03 ENCOUNTER — RESULTS FOLLOW-UP (OUTPATIENT)
Age: 45
End: 2025-06-03

## 2025-07-01 RX ORDER — ERGOCALCIFEROL 1.25 MG/1
CAPSULE, LIQUID FILLED ORAL
Qty: 4 CAPSULE | Refills: 3 | Status: SHIPPED | OUTPATIENT
Start: 2025-07-01

## (undated) DEVICE — SUTURE VCRL SZ 4-0 L18IN ABSRB UD L19MM PS-2 3/8 CIR PRIM J496H

## (undated) DEVICE — ELECTRODE PT RET AD L9FT HI MOIST COND ADH HYDRGEL CORDED

## (undated) DEVICE — SINGLE PORT MANIFOLD: Brand: NEPTUNE 2

## (undated) DEVICE — TOWEL,OR,DSP,ST,BLUE,STD,4/PK,20PK/CS: Brand: MEDLINE

## (undated) DEVICE — BRUSH ENDO COMBO

## (undated) DEVICE — NEPTUNE E-SEP SMOKE EVACUATION PENCIL, COATED, 70MM BLADE, PUSH BUTTON SWITCH: Brand: NEPTUNE E-SEP

## (undated) DEVICE — ADHESIVE SKIN CLSR 0.7ML TOP DERMBND ADV

## (undated) DEVICE — COVER LT HNDL BLU PLAS

## (undated) DEVICE — PRECISOR HOT DISPOSABLE HOT BIOPSY FORCEPS, 2.8 MM X 230 CM, OLYMPUS CORD: Brand: PRECISOR

## (undated) DEVICE — APPLICATOR MEDICATED 26 CC SOLUTION HI LT ORNG CHLORAPREP

## (undated) DEVICE — SPONGE,LAP,18"X18",DLX,XR,ST,5/PK,40/PK: Brand: MEDLINE

## (undated) DEVICE — SUTURE VCRL SZ 3-0 L36IN ABSRB UD L36MM CT-1 1/2 CIR J944H

## (undated) DEVICE — LABEL MED MINI W/ MARKER

## (undated) DEVICE — COUNTER NDL 40 COUNT HLD 70 FOAM BLK ADH W/ MAG

## (undated) DEVICE — GLOVE SURG SZ 65 THK91MIL LTX FREE SYN POLYISOPRENE

## (undated) DEVICE — PACK,BASIC: Brand: MEDLINE

## (undated) DEVICE — YANKAUER,BULB TIP,W/O VENT,RIGID,STERILE: Brand: MEDLINE

## (undated) DEVICE — BLADE,CARBON-STEEL,10,STRL,DISPOSABLE,TB: Brand: MEDLINE

## (undated) DEVICE — CONMED SCOPE SAVER BITE BLOCK, 20X27 MM: Brand: SCOPE SAVER

## (undated) DEVICE — TOTAL TRAY, DB, 100% SILI FOLEY, 16FR 10: Brand: MEDLINE

## (undated) DEVICE — TUBING, SUCTION, 1/4" X 10', STRAIGHT: Brand: MEDLINE

## (undated) DEVICE — ENDO CARRY-ON PROCEDURE KIT: Brand: ENDO CARRY-ON PROCEDURE KIT

## (undated) DEVICE — DRAPE,T,LAPARO,TRANS,STERILE: Brand: MEDLINE

## (undated) DEVICE — SEALER ENDOSCP NANO COAT OPN DIV CRV L JAW LIGASURE IMPACT

## (undated) DEVICE — SUTURE VCRL SZ 2-0 L36IN ABSRB UD L36MM CT-1 1/2 CIR J945H

## (undated) DEVICE — TUBE SET 96 MM 64 MM H2O PERISTALTIC STD AUX CHANNEL

## (undated) DEVICE — GAUZE,SPONGE,4"X4",16PLY,XRAY,STRL,LF: Brand: MEDLINE

## (undated) DEVICE — TUBING, SUCTION, 9/32" X 12', STRAIGHT: Brand: MEDLINE INDUSTRIES, INC.

## (undated) DEVICE — DRAPE EQUIP TRNSPRT CONTAINMENT FOR BK TAB

## (undated) DEVICE — Device: Brand: ENDO SMARTCAP

## (undated) DEVICE — LINER INCONT W7XL14IN PEACH POLYMER FLUF ADH WT CNTOUR DLX

## (undated) DEVICE — SKIN PREP TRAY 4 COMPARTM TRAY: Brand: MEDLINE INDUSTRIES, INC.

## (undated) DEVICE — AGENT HEMSTAT 3GM OXIDIZED REGENERATED CELOS ABSRB FOR CONT (ORDER MULTIPLES OF 5EA)

## (undated) DEVICE — SUTURE VCRL SZ 0 L36IN ABSRB UD L36MM CT-1 1/2 CIR J946H

## (undated) DEVICE — GOWN,AURORA,NONREINFORCED,LARGE: Brand: MEDLINE